# Patient Record
Sex: MALE | Race: ASIAN | NOT HISPANIC OR LATINO | URBAN - METROPOLITAN AREA
[De-identification: names, ages, dates, MRNs, and addresses within clinical notes are randomized per-mention and may not be internally consistent; named-entity substitution may affect disease eponyms.]

---

## 2021-09-11 ENCOUNTER — EMERGENCY (EMERGENCY)
Facility: HOSPITAL | Age: 86
LOS: 1 days | Discharge: ANOTHER HOSPITAL | End: 2021-09-11
Attending: EMERGENCY MEDICINE
Payer: MEDICARE

## 2021-09-11 ENCOUNTER — INPATIENT (INPATIENT)
Facility: HOSPITAL | Age: 86
LOS: 5 days | Discharge: NURSING, REHAB OR LONG-TERM CARE | End: 2021-09-17
Attending: STUDENT IN AN ORGANIZED HEALTH CARE EDUCATION/TRAINING PROGRAM | Admitting: STUDENT IN AN ORGANIZED HEALTH CARE EDUCATION/TRAINING PROGRAM
Payer: MEDICARE

## 2021-09-11 VITALS — DIASTOLIC BLOOD PRESSURE: 52 MMHG | SYSTOLIC BLOOD PRESSURE: 123 MMHG | HEART RATE: 59 BPM

## 2021-09-11 VITALS
DIASTOLIC BLOOD PRESSURE: 87 MMHG | RESPIRATION RATE: 18 BRPM | TEMPERATURE: 98 F | SYSTOLIC BLOOD PRESSURE: 212 MMHG | HEART RATE: 55 BPM | WEIGHT: 85.98 LBS | OXYGEN SATURATION: 97 %

## 2021-09-11 VITALS
TEMPERATURE: 98 F | OXYGEN SATURATION: 98 % | RESPIRATION RATE: 18 BRPM | HEART RATE: 71 BPM | DIASTOLIC BLOOD PRESSURE: 78 MMHG | SYSTOLIC BLOOD PRESSURE: 167 MMHG | WEIGHT: 85.98 LBS

## 2021-09-11 DIAGNOSIS — I61.4 NONTRAUMATIC INTRACEREBRAL HEMORRHAGE IN CEREBELLUM: ICD-10-CM

## 2021-09-11 LAB
ALBUMIN SERPL ELPH-MCNC: 3.9 G/DL — SIGNIFICANT CHANGE UP (ref 3.5–5)
ALP SERPL-CCNC: 55 U/L — SIGNIFICANT CHANGE UP (ref 40–120)
ALT FLD-CCNC: 25 U/L DA — SIGNIFICANT CHANGE UP (ref 10–60)
ANION GAP SERPL CALC-SCNC: 5 MMOL/L — SIGNIFICANT CHANGE UP (ref 5–17)
APTT BLD: 29.4 SEC — SIGNIFICANT CHANGE UP (ref 27–36.3)
APTT BLD: 30.5 SEC — SIGNIFICANT CHANGE UP (ref 27.5–35.5)
AST SERPL-CCNC: 21 U/L — SIGNIFICANT CHANGE UP (ref 10–40)
BASOPHILS # BLD AUTO: 0.02 K/UL — SIGNIFICANT CHANGE UP (ref 0–0.2)
BASOPHILS NFR BLD AUTO: 0.4 % — SIGNIFICANT CHANGE UP (ref 0–2)
BILIRUB SERPL-MCNC: 0.7 MG/DL — SIGNIFICANT CHANGE UP (ref 0.2–1.2)
BLD GP AB SCN SERPL QL: SIGNIFICANT CHANGE UP
BUN SERPL-MCNC: 16 MG/DL — SIGNIFICANT CHANGE UP (ref 7–18)
CALCIUM SERPL-MCNC: 8.7 MG/DL — SIGNIFICANT CHANGE UP (ref 8.4–10.5)
CHLORIDE SERPL-SCNC: 102 MMOL/L — SIGNIFICANT CHANGE UP (ref 96–108)
CO2 SERPL-SCNC: 28 MMOL/L — SIGNIFICANT CHANGE UP (ref 22–31)
CREAT SERPL-MCNC: 0.72 MG/DL — SIGNIFICANT CHANGE UP (ref 0.5–1.3)
EOSINOPHIL # BLD AUTO: 0.01 K/UL — SIGNIFICANT CHANGE UP (ref 0–0.5)
EOSINOPHIL NFR BLD AUTO: 0.2 % — SIGNIFICANT CHANGE UP (ref 0–6)
GLUCOSE SERPL-MCNC: 115 MG/DL — HIGH (ref 70–99)
HCT VFR BLD CALC: 36.6 % — LOW (ref 39–50)
HGB BLD-MCNC: 12.5 G/DL — LOW (ref 13–17)
IMM GRANULOCYTES NFR BLD AUTO: 0.4 % — SIGNIFICANT CHANGE UP (ref 0–1.5)
INR BLD: 1.02 RATIO — SIGNIFICANT CHANGE UP (ref 0.88–1.16)
INR BLD: 1.05 RATIO — SIGNIFICANT CHANGE UP (ref 0.88–1.16)
LYMPHOCYTES # BLD AUTO: 0.91 K/UL — LOW (ref 1–3.3)
LYMPHOCYTES # BLD AUTO: 16.9 % — SIGNIFICANT CHANGE UP (ref 13–44)
MCHC RBC-ENTMCNC: 32.3 PG — SIGNIFICANT CHANGE UP (ref 27–34)
MCHC RBC-ENTMCNC: 34.2 GM/DL — SIGNIFICANT CHANGE UP (ref 32–36)
MCV RBC AUTO: 94.6 FL — SIGNIFICANT CHANGE UP (ref 80–100)
MONOCYTES # BLD AUTO: 0.29 K/UL — SIGNIFICANT CHANGE UP (ref 0–0.9)
MONOCYTES NFR BLD AUTO: 5.4 % — SIGNIFICANT CHANGE UP (ref 2–14)
NEUTROPHILS # BLD AUTO: 4.12 K/UL — SIGNIFICANT CHANGE UP (ref 1.8–7.4)
NEUTROPHILS NFR BLD AUTO: 76.7 % — SIGNIFICANT CHANGE UP (ref 43–77)
NRBC # BLD: 0 /100 WBCS — SIGNIFICANT CHANGE UP (ref 0–0)
PLATELET # BLD AUTO: 155 K/UL — SIGNIFICANT CHANGE UP (ref 150–400)
POTASSIUM SERPL-MCNC: 3.8 MMOL/L — SIGNIFICANT CHANGE UP (ref 3.5–5.3)
POTASSIUM SERPL-SCNC: 3.8 MMOL/L — SIGNIFICANT CHANGE UP (ref 3.5–5.3)
PROT SERPL-MCNC: 7.7 G/DL — SIGNIFICANT CHANGE UP (ref 6–8.3)
PROTHROM AB SERPL-ACNC: 12 SEC — SIGNIFICANT CHANGE UP (ref 10.6–13.6)
PROTHROM AB SERPL-ACNC: 12.1 SEC — SIGNIFICANT CHANGE UP (ref 10.6–13.6)
RBC # BLD: 3.87 M/UL — LOW (ref 4.2–5.8)
RBC # FLD: 11.9 % — SIGNIFICANT CHANGE UP (ref 10.3–14.5)
SARS-COV-2 RNA SPEC QL NAA+PROBE: SIGNIFICANT CHANGE UP
SODIUM SERPL-SCNC: 135 MMOL/L — SIGNIFICANT CHANGE UP (ref 135–145)
TROPONIN I SERPL-MCNC: <0.015 NG/ML — SIGNIFICANT CHANGE UP (ref 0–0.04)
WBC # BLD: 5.37 K/UL — SIGNIFICANT CHANGE UP (ref 3.8–10.5)
WBC # FLD AUTO: 5.37 K/UL — SIGNIFICANT CHANGE UP (ref 3.8–10.5)

## 2021-09-11 PROCEDURE — 71045 X-RAY EXAM CHEST 1 VIEW: CPT

## 2021-09-11 PROCEDURE — 87635 SARS-COV-2 COVID-19 AMP PRB: CPT

## 2021-09-11 PROCEDURE — 36430 TRANSFUSION BLD/BLD COMPNT: CPT

## 2021-09-11 PROCEDURE — 86850 RBC ANTIBODY SCREEN: CPT

## 2021-09-11 PROCEDURE — 99233 SBSQ HOSP IP/OBS HIGH 50: CPT

## 2021-09-11 PROCEDURE — 99291 CRITICAL CARE FIRST HOUR: CPT

## 2021-09-11 PROCEDURE — 82962 GLUCOSE BLOOD TEST: CPT

## 2021-09-11 PROCEDURE — 86900 BLOOD TYPING SEROLOGIC ABO: CPT

## 2021-09-11 PROCEDURE — 85730 THROMBOPLASTIN TIME PARTIAL: CPT

## 2021-09-11 PROCEDURE — 99291 CRITICAL CARE FIRST HOUR: CPT | Mod: 25

## 2021-09-11 PROCEDURE — 70498 CT ANGIOGRAPHY NECK: CPT | Mod: 26,MA

## 2021-09-11 PROCEDURE — 70496 CT ANGIOGRAPHY HEAD: CPT | Mod: MA

## 2021-09-11 PROCEDURE — 85025 COMPLETE CBC W/AUTO DIFF WBC: CPT

## 2021-09-11 PROCEDURE — 36415 COLL VENOUS BLD VENIPUNCTURE: CPT

## 2021-09-11 PROCEDURE — 70450 CT HEAD/BRAIN W/O DYE: CPT | Mod: MA

## 2021-09-11 PROCEDURE — 93005 ELECTROCARDIOGRAM TRACING: CPT

## 2021-09-11 PROCEDURE — 71045 X-RAY EXAM CHEST 1 VIEW: CPT | Mod: 26

## 2021-09-11 PROCEDURE — 70496 CT ANGIOGRAPHY HEAD: CPT | Mod: 26,MA

## 2021-09-11 PROCEDURE — 93010 ELECTROCARDIOGRAM REPORT: CPT

## 2021-09-11 PROCEDURE — 99285 EMERGENCY DEPT VISIT HI MDM: CPT

## 2021-09-11 PROCEDURE — 84484 ASSAY OF TROPONIN QUANT: CPT

## 2021-09-11 PROCEDURE — 85610 PROTHROMBIN TIME: CPT

## 2021-09-11 PROCEDURE — 80053 COMPREHEN METABOLIC PANEL: CPT

## 2021-09-11 PROCEDURE — 70498 CT ANGIOGRAPHY NECK: CPT | Mod: MA

## 2021-09-11 PROCEDURE — 86901 BLOOD TYPING SEROLOGIC RH(D): CPT

## 2021-09-11 PROCEDURE — 70450 CT HEAD/BRAIN W/O DYE: CPT | Mod: 26,MA,59

## 2021-09-11 RX ORDER — NICARDIPINE HYDROCHLORIDE 30 MG/1
5 CAPSULE, EXTENDED RELEASE ORAL
Qty: 40 | Refills: 0 | Status: DISCONTINUED | OUTPATIENT
Start: 2021-09-11 | End: 2021-09-12

## 2021-09-11 RX ORDER — ACETAMINOPHEN 500 MG
650 TABLET ORAL ONCE
Refills: 0 | Status: COMPLETED | OUTPATIENT
Start: 2021-09-11 | End: 2021-09-11

## 2021-09-11 RX ORDER — MECLIZINE HCL 12.5 MG
25 TABLET ORAL ONCE
Refills: 0 | Status: DISCONTINUED | OUTPATIENT
Start: 2021-09-11 | End: 2021-09-11

## 2021-09-11 RX ORDER — NICARDIPINE HYDROCHLORIDE 30 MG/1
5 CAPSULE, EXTENDED RELEASE ORAL
Qty: 40 | Refills: 0 | Status: DISCONTINUED | OUTPATIENT
Start: 2021-09-11 | End: 2021-09-14

## 2021-09-11 RX ORDER — SODIUM CHLORIDE 9 MG/ML
1000 INJECTION INTRAMUSCULAR; INTRAVENOUS; SUBCUTANEOUS ONCE
Refills: 0 | Status: DISCONTINUED | OUTPATIENT
Start: 2021-09-11 | End: 2021-09-11

## 2021-09-11 RX ORDER — CHLORHEXIDINE GLUCONATE 213 G/1000ML
1 SOLUTION TOPICAL
Refills: 0 | Status: DISCONTINUED | OUTPATIENT
Start: 2021-09-11 | End: 2021-09-17

## 2021-09-11 RX ADMIN — NICARDIPINE HYDROCHLORIDE 5 MG/HR: 30 CAPSULE, EXTENDED RELEASE ORAL at 18:00

## 2021-09-11 RX ADMIN — Medication 650 MILLIGRAM(S): at 11:30

## 2021-09-11 RX ADMIN — Medication 650 MILLIGRAM(S): at 10:38

## 2021-09-11 RX ADMIN — NICARDIPINE HYDROCHLORIDE 25 MG/HR: 30 CAPSULE, EXTENDED RELEASE ORAL at 18:00

## 2021-09-11 RX ADMIN — NICARDIPINE HYDROCHLORIDE 25 MG/HR: 30 CAPSULE, EXTENDED RELEASE ORAL at 10:38

## 2021-09-11 NOTE — H&P ADULT - HISTORY OF PRESENT ILLNESS
85 y/o male with pmh of      85 y/o male (preferentially, Cantonese-speaking) with pmh of irregular heart rate, BPH, and disc herniation who presented to [facility] for nausea, headache, and the inability to walk on his own without feeling dizzy. Last night, the pt around 8:45 pm was about to have dinner when he noticed he felt nauseous and had a frontal headache and it was hard for him to get up. His family had to help him to get up and go to his bed with his cane. While laying in bed, his nausea was gone, however his headache was persistent. The pt and his family decided that in the morning they would go to his PCP for further care. However given his persistent nausea and bilateral frontal headache in the morning, they called 911 and went to the ED.    ED VS: 97.6 F, 71, 167/78, 18, and 98% on RA. ED tx: no current treatment started.

## 2021-09-11 NOTE — CONSULT NOTE ADULT - ATTENDING COMMENTS
85 yr old male w/ R cerebellar hemorrhage (hypertension related likely). Patient is awake and alert with some dysmetria. Agree with the above plan per ACP.   - Admit to ICU for neuro checks  - Patient needs three sequential stable scans  - management per Stroke neurology  - would refrain from all AC for at least two weeks

## 2021-09-11 NOTE — ED PROVIDER NOTE - CONSTITUTIONAL, MLM
awake, alert, oriented to person, place, time/situation and in no apparent distress. slender, action tremor normal...

## 2021-09-11 NOTE — ED PROVIDER NOTE - OBJECTIVE STATEMENT
85 male, hx: BPH, HTN - presents as a transfer from [place] for Cerebellar bleed. As per family at 9 PM yesterday at dinner he was endorsing a B/L frontal headache, went to rest later that night when the headache persisted and started to develop gait instability. Denies any recent trauma/falls. Denies any fevers, chills, cough, CP, SOB, abdominal pain, nausea, vomiting, diarrhea, constipation, bloody stools, dysuric symptoms. Reports he has an unbalanced gait since yesterday. Spoke with Daughter who is gathering medication list as it's unclear if he's on Blood thinners. Presents on Nicardipine with SBP < 160.

## 2021-09-11 NOTE — ED PROVIDER NOTE - CLINICAL SUMMARY MEDICAL DECISION MAKING FREE TEXT BOX
85 yr old male with hx of HTN, BPH, gastritis presents to ed c/o dizziness, generalized weakness, nausea and frontal headache around 9p yesterday which improve after sleeping but return this am. generally slender, no visual changes, no fever, no sob, no cp, no abd pain, no dysuria, no focal weakness. didn't take meds this am but unsure which bp meds pt takes.    generalized weakness with frontal headache r/o ic mass vs deconditioning vs malnutrition vs atypical acs. labs, ekg, ct head, cxr, admit, tylenol

## 2021-09-11 NOTE — ED PROVIDER NOTE - PROGRESS NOTE DETAILS
espinoza: headache less. Bp 146/ with Nicardipine 2.5mg. pt comfortable. spoke with Dr kelly and accepted to [facility] but wants 1 unit plt.

## 2021-09-11 NOTE — ED PROVIDER NOTE - NS ED ROS FT
Constitution: No Fever or chills, No Weight Loss,   Eyes: No visual changes  HEENT: No cough, No Discharge, No Rhinorrhea, No URI symptoms  Cardio: No Chest pain, No Palpitations, No Dyspnea  Resp: No SOB, No Wheezing  GI: No abdominal pain, No Nausea, No Vomiting, No Constipation, No Diarrhea  : No burning upon urination, trouble urinating, no foul odor from urine  MSK: No Back pain, No Numbness, No Tingling, No Weakness  Neuro: (+) Headache, No changes to Vision, No changes to Hearing, (+) abnormal Gait  Skin: No rashes, No Bruising, No Swelling

## 2021-09-11 NOTE — ED PROVIDER NOTE - PHYSICAL EXAMINATION
GEN - NAD; non-toxic; A+Ox3, speaking full sentences, (+) unsteady gait   HENT - NC/AT, No visible Ecchymosis, No Abrasions, No Lac/Tears, MMM, no discharge  EYES - EOMI, PERRL, no conjunctival pallor, no scleral icterus  NECK - Neck supple, No LAD, No Swelling  PULM - CTA B/L,  symmetric breath sounds  CV -  RRR, S1 S2, no murmurs 2+ Pulses B/L UE  GI - (-) Paulson's, (-) Rovsings, (-) McBurneys; NT/ND, soft, no guarding, no rebound, no masses    MSK/EXT- no CVA tenderness; no edema, no gross deformity, warm and well perfused, no calf tenderness/swelling/erythema   SKIN - no rash or bruising  NEUROLOGIC - alert, CN2-12 intact, sensation intact, moving all 4 ext with 5/5 Strength; (+) Abnormal FTN on L; (+) Unstable Gait

## 2021-09-11 NOTE — ED PROVIDER NOTE - CLINICAL SUMMARY MEDICAL DECISION MAKING FREE TEXT BOX
Impression:  R cerebellar bleed, awaiting NSGY recs.  HD wnl.  +cerebellar symptoms on exam  Plan:  dispo per NSGY recs.

## 2021-09-11 NOTE — ED ADULT NURSE REASSESSMENT NOTE - NS ED NURSE REASSESS COMMENT FT1
Break RN: pt's /72 at this time, MD Cornejo made aware. Cardene drip (med was initiated at [place]) stopped at this time as per MD orders. Pt in NAD, respirations even/unlabored, will continue to monitor.

## 2021-09-11 NOTE — CONSULT NOTE ADULT - ASSESSMENT
85 yr old male w/ R cerebellar hemorrhage (hypertension related likely)  -Admit to monitored setting  -q1 hour neuro checks  -Rpt CT head in 24 hours  -No role for angiogram (d/w INR attending)  -Stroke neurology consult   -D/w Dr Alfonso

## 2021-09-11 NOTE — ED PROVIDER NOTE - OBJECTIVE STATEMENT
85 yr old male with hx of HTN, BPH, gastritis presents to ed c/o dizziness, generalized weakness, nausea and frontal headache around 9p yesterday which improve after sleeping but return this am. generally slender, no visual changes, no fever, no sob, no cp, no abd pain, no dysuria, no focal weakness. didn't take meds this am but unsure which bp meds pt takes.

## 2021-09-11 NOTE — H&P ADULT - ATTENDING COMMENTS
pt is an 83 yo male with hx CAD, htn, irregular heart rate who presents with   episode of dizziness and nausea, last pm during supper, per daughter , no   hx fall, chest pain or sob, helped to bed, this am symptoms continued,   pt brought to [place], noted to have a cerebellar bleed on ct scan  with elevated sbp 170's.  bp 150/69 rr 18 heent no jvd, lungs clear  heart s1s2 abdomen cachectic , neuro finger to nose dysmetria,     labs noted as above    ct scan 1.2 x 4.4 parenchymal hemorrhage in the  cerebellum      A/P  83 yo male with acute intraparenchymal hemorrhage cerebellar  -monitor neuro exam q 1 hrs  -repeat ct scan in am  -keep npo  -aspiration  precaution  -monitor lytes and replete as necessary  -monitor urine output  -dvt prophylaxis with scds  -hold all antiplatelets  -neurology evaluation for stroke

## 2021-09-11 NOTE — CONSULT NOTE ADULT - SUBJECTIVE AND OBJECTIVE BOX
HPI:  85 male, hx: BPH, HTN - presents as a transfer from [place] for Cerebellar bleed. As per family at 9 PM yesterday at dinner he was endorsing a B/L frontal headache, went to rest later that night when the headache persisted and started to develop gait instability. Denies any recent trauma/falls. Denies any fevers, chills, cough, CP, SOB, abdominal pain, nausea, vomiting, diarrhea, constipation, bloody stools, dysuric symptoms. Reports he has an unbalanced gait since yesterday. Spoke with Daughter who is gathering medication list as it's unclear if he's on Blood thinners. Presents on Nicardipine with SBP < 160.    PAST MEDICAL & SURGICAL HISTORY:  No pertinent past medical history    No significant past surgical history      Allergies    No Known Allergies        SOCIAL HISTORY:  FAMILY HISTORY:  No pertinent family history in first degree relatives      Vital Signs Last 24 Hrs  T(C): 36.4 (11 Sep 2021 16:11), Max: 36.4 (11 Sep 2021 16:11)  T(F): 97.6 (11 Sep 2021 16:11), Max: 97.6 (11 Sep 2021 16:11)  HR: 89 (11 Sep 2021 17:05) (71 - 89)  BP: 149/62 (11 Sep 2021 17:05) (142/73 - 167/78)  RR: 19 (11 Sep 2021 17:05) (18 - 19)  SpO2: 99% (11 Sep 2021 17:05) (98% - 99%)    PHYSICAL EXAM:  Awake Alert Attentive Affect appropriate, speech soft but clear  Cranial Nerves II-XII Intact  Pupils: PERRL  Motor- Moving all extremities   Dysmetria in FT noted BL  patient c/o dizziness        LABS:        RADIOLOGY & ADDITIONAL STUDIES:    ct/cta at [facility] reviewed electronically with attending - no aneursym, + R cerebellar hematoma noted   HPI:  85 male, hx: BPH, HTN - presents as a transfer from [place] for Cerebellar bleed. As per family at 9 PM yesterday at dinner he was endorsing a B/L frontal headache, went to rest later that night when the headache persisted and started to develop gait instability. Denies any recent trauma/falls. Denies any fevers, chills, cough, CP, SOB, abdominal pain, nausea, vomiting, diarrhea, constipation, bloody stools, dysuric symptoms. Reports he has an unbalanced gait since yesterday. Spoke with Daughter who is gathering medication list as it's unclear if he's on Blood thinners. Presents on Nicardipine with SBP < 160.    PAST MEDICAL & SURGICAL HISTORY:  No pertinent past medical history    No significant past surgical history      Allergies    No Known Allergies      SOCIAL HISTORY:  FAMILY HISTORY:  No pertinent family history in first degree relatives      Vital Signs Last 24 Hrs  T(C): 36.4 (11 Sep 2021 16:11), Max: 36.4 (11 Sep 2021 16:11)  T(F): 97.6 (11 Sep 2021 16:11), Max: 97.6 (11 Sep 2021 16:11)  HR: 89 (11 Sep 2021 17:05) (71 - 89)  BP: 149/62 (11 Sep 2021 17:05) (142/73 - 167/78)  RR: 19 (11 Sep 2021 17:05) (18 - 19)  SpO2: 99% (11 Sep 2021 17:05) (98% - 99%)    PHYSICAL EXAM:  Awake Alert Attentive Affect appropriate, speech soft but clear  Cranial Nerves II-XII Intact  Pupils: PERRL  Motor- Moving all extremities   Dysmetria in FT noted BL  patient c/o dizziness        LABS:      RADIOLOGY & ADDITIONAL STUDIES:    [facility] - no aneursym, + R cerebellar hematoma noted

## 2021-09-11 NOTE — ED PROVIDER NOTE - ATTENDING CONTRIBUTION TO CARE
MD Benjamin:  patient seen and evaluated with the resident.  I was present for key portions of the History & Physical, and I agree with the Impression & Plan.  MD Benjamin: 86 yo M, xfer from [facility] with CT/CTA evidence of cerebellar bleed  Context:  HA and ataxia onset last night.  Not anticoagulated.  Got PLT xfusion at OSH at recommendation of NSGY, and on nicardipine gtt with /70s  Associated Sx: cannot ambulate.  No N/v/d, no f/c, no blurry vision  Location: n/a  Better:  laying still  Worse:  attempting to ambulating  VS: wnl.  Physical Exam: elderly M, NAD, NCAT, PERRL, EOMI, neck supple, RRR, CTA B, Abd: s/nd/nt, 3  Ext: no edema.  Neuro: AAOx3, finger-to-nose & heel-to-shin abnormal (worse on L than R)  Impression:  R cerebellar bleed, awaiting NSGY recs.  HD wnl.  +cerebellar symptoms on exam  Plan:  dispo per NSGY recs. MD Benjamin:  patient seen and evaluated with the resident.  I was present for key portions of the History & Physical, and I agree with the Impression & Plan.  MD Benjamin: 84 yo M, xfer from [facility] with CT/CTA evidence of cerebellar bleed  Context:  HA and ataxia onset last night.  +takes clopidogrel 75mg; last dose last night. Got PLT xfusion at OSH at recommendation of NSGY, and on nicardipine gtt with /70s  Associated Sx: cannot ambulate.  No N/v/d, no f/c, no blurry vision  Location: n/a  Better:  laying still  Worse:  attempting to ambulating  VS: wnl.  Physical Exam: elderly M, NAD, NCAT, PERRL, EOMI, neck supple, RRR, CTA B, Abd: s/nd/nt, 3  Ext: no edema.  Neuro: AAOx3, finger-to-nose & heel-to-shin abnormal (worse on L than R)  Impression:  R cerebellar bleed, awaiting NSGY recs.  HD wnl.  +cerebellar symptoms on exam  Plan:  dispo per NSGY recs.

## 2021-09-11 NOTE — H&P ADULT - ASSESSMENT
83 y/o male with pmh of BPH presenting to [facility] as a transfer from [facility] due to intraparenchymal bleed in the cerebellum resulting in MICU admission for further management of blood pressure control and neurochecks.    NEUROLOGY:  CTA Head and Neck with intraparenchymal hemorrhage of cerebellum  Neurosx  Neurochecks q4  Maintain SBP<140, nicardipine gtt to maintain this goal  Repeat CTH for tomorrow AM    PULMONARY:  No active issues    CARDIOLOGY:  Maintain SBP <140 for CTH findings  No active issues    GASTROINTESTINAL:  DASH diet    RENAL/:  BMP pending    INFECTIOUS DISEASE:  Afebrile, pending WBC    HEMATOLOGY:  CBC pending    ENDOCRINE  No active issues    PROPHYLAXIS  SCDs, hold off on pharmacological AC in setting of CTH findings    ETHICS/DISPOSITION:  - Code status to be discussed with HCP    LINES/DRAINS/TUBES/DEVICES:  - Peripheral lines    Vale Blum MD, PGY2  Internal Medicine 85 y/o male with pmh of BPH presenting to [facility] as a transfer from [facility] due to intraparenchymal bleed in the cerebellum resulting in MICU admission for further management of blood pressure control and neurochecks.    NEUROLOGY:  CTA Head and Neck with intraparenchymal hemorrhage of cerebellum  Neurosx  Neurochecks q4  Maintain SBP<140, nicardipine gtt to maintain this goal  Repeat CTH for tomorrow at 9 AM (24 hours later)  Stroke neurology consulted    PULMONARY:  No active issues    CARDIOLOGY:  Maintain SBP <140 for CTH findings  No active issues    GASTROINTESTINAL:  DASH diet    RENAL/:  BMP pending    INFECTIOUS DISEASE:  Afebrile, pending WBC    HEMATOLOGY:  CBC pending    ENDOCRINE  No active issues    PROPHYLAXIS  SCDs, hold off on pharmacological AC in setting of CTH findings    ETHICS/DISPOSITION:  - Code status to be discussed with HCP    LINES/DRAINS/TUBES/DEVICES:  - Peripheral lines    Vale Blum MD, PGY2  Internal Medicine

## 2021-09-11 NOTE — ED ADULT NURSE NOTE - OBJECTIVE STATEMENT
Pt arriving to Trauma A A&OX2 disoriented to place as a transfer from [place] for a cerebellar bleed. Pt alert, responsive to verbal stimuli. No facial droop/slurred speech noted. Tremor to L arm. Equal strength bilaterally. Resp even and unlabored. Pt states he felt dizzy yesterday and had a fall. Denies hitting head. Pt has trouble finding words. No obvious deformities noted. Pt arriving with 18g IV in RAC and 20g IV placed in L arm. Pt arriving with nicardipine drip at 12.5ml/hour. /73. Awaiting further orders. Daniel RN: Pt arriving to Trauma A A&OX2 disoriented to place as a transfer from [place] for a cerebellar bleed. Pt alert, responsive to verbal stimuli. No facial droop/slurred speech noted. PERRLA. Equal strength bilaterally. Resp even and unlabored. Pt states he felt dizzy yesterday and had a fall. Denies hitting head. Pt has trouble finding words. No obvious deformities noted. Pt arriving with 18g IV in RAC and 20g IV placed in L arm. Pt arriving with nicardipine drip at 12.5ml/hour. /73. Awaiting further orders. Report given to primary RN.

## 2021-09-11 NOTE — ED ADULT NURSE NOTE - CHIEF COMPLAINT QUOTE
from [facility] for intraparenchymal hemorrhage in cerebellum. Had onset of dizziness yesterday at 9pm. on Nicardipine 2.5mg/HR. On plavix as per ems.  HTN in FH bp 212/87. Hx HTN, Gastritis, BPH

## 2021-09-11 NOTE — H&P ADULT - NSICDXPASTMEDICALHX_GEN_ALL_CORE_FT
PAST MEDICAL HISTORY:  BPH (benign prostatic hyperplasia)     Irregular heart rate     Lumbar disc herniation

## 2021-09-11 NOTE — ED CLERICAL - NS ED CLERK NOTE PRE-ARRIVAL INFORMATION; ADDITIONAL PRE-ARRIVAL INFORMATION
Patient transferred for Acute intraparenchymal hemorrhage in Cerebellum.  Covid neg pt on nicardipine drip.

## 2021-09-11 NOTE — ED ADULT TRIAGE NOTE - CHIEF COMPLAINT QUOTE
from [facility] for intraparenchymal hemorrhage in cerebellum. Had onset of dizziness yesterday at 9pm. on Nicardipine 2.5mg/HR. On plavix as per ems.  HTN in FH bp 212/87. Hx HTN, Gastritis, BPH

## 2021-09-11 NOTE — H&P ADULT - NSHPLABSRESULTS_GEN_ALL_CORE
Awaiting labs.\    CTA Head and Neck demonstrated        1. Right carotid system:  No hemodynamically significant stenosis.        2.   Left carotid system:  No hemodynamically significant stenosis.        3.   Intracranial circulation: No aneurysm or AVM. No hemodynamically significant stenosis.        4.   Brain:  There is acute intraparenchymal hemorrhage in the cerebellum at the midline and right of midline measuring approximately 1.2 x 4.4 cm with surrounding mild vasogenic edema, not significantly changed since prior exam

## 2021-09-11 NOTE — H&P ADULT - NSHPREVIEWOFSYSTEMS_GEN_ALL_CORE

## 2021-09-11 NOTE — H&P ADULT - NSHPPHYSICALEXAM_GEN_ALL_CORE
PHYSICAL EXAM:  T(C): 36.4 (09-11-21 @ 16:11), Max: 36.4 (09-11-21 @ 16:11)  HR: 75 (09-11-21 @ 18:36) (71 - 90)  BP: 150/73 (09-11-21 @ 18:36) (106/72 - 167/78)  RR: 20 (09-11-21 @ 18:36) (18 - 20)  SpO2: 100% (09-11-21 @ 18:36) (98% - 100%)  GENERAL: NAD, thin  HEAD:  Atraumatic, Normocephalic. No overt bruises.   EYES: EOMI, PERRLA, conjunctiva and sclera clear  NECK: Supple, No JVD  CHEST/LUNG: Clear to auscultation bilaterally; No wheeze  HEART: Regular rate and rhythm; No murmurs, rubs, or gallops  ABDOMEN: Soft, Nontender, Nondistended; Bowel sounds present  EXTREMITIES:  2+ Peripheral Pulses, No clubbing, cyanosis, or edema  PSYCH: AAOx3  NEUROLOGY: non-focal  SKIN: No rashes or lesions

## 2021-09-12 LAB
ALBUMIN SERPL ELPH-MCNC: 4.6 G/DL — SIGNIFICANT CHANGE UP (ref 3.3–5)
ALP SERPL-CCNC: 63 U/L — SIGNIFICANT CHANGE UP (ref 40–120)
ALT FLD-CCNC: 17 U/L — SIGNIFICANT CHANGE UP (ref 4–41)
ANION GAP SERPL CALC-SCNC: 18 MMOL/L — HIGH (ref 7–14)
AST SERPL-CCNC: 30 U/L — SIGNIFICANT CHANGE UP (ref 4–40)
BILIRUB SERPL-MCNC: 0.7 MG/DL — SIGNIFICANT CHANGE UP (ref 0.2–1.2)
BUN SERPL-MCNC: 14 MG/DL — SIGNIFICANT CHANGE UP (ref 7–23)
CALCIUM SERPL-MCNC: 9.5 MG/DL — SIGNIFICANT CHANGE UP (ref 8.4–10.5)
CHLORIDE SERPL-SCNC: 97 MMOL/L — LOW (ref 98–107)
CO2 SERPL-SCNC: 21 MMOL/L — LOW (ref 22–31)
COVID-19 SPIKE DOMAIN AB INTERP: POSITIVE
COVID-19 SPIKE DOMAIN ANTIBODY RESULT: >250 U/ML — HIGH
CREAT SERPL-MCNC: 0.62 MG/DL — SIGNIFICANT CHANGE UP (ref 0.5–1.3)
GLUCOSE SERPL-MCNC: 126 MG/DL — HIGH (ref 70–99)
HCT VFR BLD CALC: 38 % — LOW (ref 39–50)
HGB BLD-MCNC: 13.2 G/DL — SIGNIFICANT CHANGE UP (ref 13–17)
MAGNESIUM SERPL-MCNC: 2.1 MG/DL — SIGNIFICANT CHANGE UP (ref 1.6–2.6)
MCHC RBC-ENTMCNC: 32.3 PG — SIGNIFICANT CHANGE UP (ref 27–34)
MCHC RBC-ENTMCNC: 34.7 GM/DL — SIGNIFICANT CHANGE UP (ref 32–36)
MCV RBC AUTO: 92.9 FL — SIGNIFICANT CHANGE UP (ref 80–100)
NRBC # BLD: 0 /100 WBCS — SIGNIFICANT CHANGE UP
NRBC # FLD: 0 K/UL — SIGNIFICANT CHANGE UP
PHOSPHATE SERPL-MCNC: 2.5 MG/DL — SIGNIFICANT CHANGE UP (ref 2.5–4.5)
PLATELET # BLD AUTO: 225 K/UL — SIGNIFICANT CHANGE UP (ref 150–400)
POTASSIUM SERPL-MCNC: 3.4 MMOL/L — LOW (ref 3.5–5.3)
POTASSIUM SERPL-SCNC: 3.4 MMOL/L — LOW (ref 3.5–5.3)
PROT SERPL-MCNC: 7.8 G/DL — SIGNIFICANT CHANGE UP (ref 6–8.3)
RBC # BLD: 4.09 M/UL — LOW (ref 4.2–5.8)
RBC # FLD: 11.8 % — SIGNIFICANT CHANGE UP (ref 10.3–14.5)
SARS-COV-2 IGG+IGM SERPL QL IA: >250 U/ML — HIGH
SARS-COV-2 IGG+IGM SERPL QL IA: POSITIVE
SODIUM SERPL-SCNC: 136 MMOL/L — SIGNIFICANT CHANGE UP (ref 135–145)
WBC # BLD: 10.05 K/UL — SIGNIFICANT CHANGE UP (ref 3.8–10.5)
WBC # FLD AUTO: 10.05 K/UL — SIGNIFICANT CHANGE UP (ref 3.8–10.5)

## 2021-09-12 PROCEDURE — 70450 CT HEAD/BRAIN W/O DYE: CPT | Mod: 26

## 2021-09-12 PROCEDURE — 99233 SBSQ HOSP IP/OBS HIGH 50: CPT | Mod: GC

## 2021-09-12 RX ORDER — FINASTERIDE 5 MG/1
5 TABLET, FILM COATED ORAL DAILY
Refills: 0 | Status: DISCONTINUED | OUTPATIENT
Start: 2021-09-12 | End: 2021-09-17

## 2021-09-12 RX ORDER — METOPROLOL TARTRATE 50 MG
25 TABLET ORAL
Refills: 0 | Status: DISCONTINUED | OUTPATIENT
Start: 2021-09-12 | End: 2021-09-12

## 2021-09-12 RX ORDER — INFLUENZA VIRUS VACCINE 15; 15; 15; 15 UG/.5ML; UG/.5ML; UG/.5ML; UG/.5ML
0.7 SUSPENSION INTRAMUSCULAR ONCE
Refills: 0 | Status: DISCONTINUED | OUTPATIENT
Start: 2021-09-12 | End: 2021-09-17

## 2021-09-12 RX ORDER — TAMSULOSIN HYDROCHLORIDE 0.4 MG/1
0.4 CAPSULE ORAL AT BEDTIME
Refills: 0 | Status: DISCONTINUED | OUTPATIENT
Start: 2021-09-12 | End: 2021-09-17

## 2021-09-12 RX ORDER — METOPROLOL TARTRATE 50 MG
25 TABLET ORAL DAILY
Refills: 0 | Status: DISCONTINUED | OUTPATIENT
Start: 2021-09-12 | End: 2021-09-12

## 2021-09-12 RX ORDER — POTASSIUM CHLORIDE 20 MEQ
40 PACKET (EA) ORAL ONCE
Refills: 0 | Status: COMPLETED | OUTPATIENT
Start: 2021-09-12 | End: 2021-09-12

## 2021-09-12 RX ORDER — INFLUENZA VIRUS VACCINE 15; 15; 15; 15 UG/.5ML; UG/.5ML; UG/.5ML; UG/.5ML
0.5 SUSPENSION INTRAMUSCULAR ONCE
Refills: 0 | Status: DISCONTINUED | OUTPATIENT
Start: 2021-09-12 | End: 2021-09-12

## 2021-09-12 RX ORDER — METOPROLOL TARTRATE 50 MG
25 TABLET ORAL
Refills: 0 | Status: DISCONTINUED | OUTPATIENT
Start: 2021-09-12 | End: 2021-09-14

## 2021-09-12 RX ADMIN — NICARDIPINE HYDROCHLORIDE 25 MG/HR: 30 CAPSULE, EXTENDED RELEASE ORAL at 07:41

## 2021-09-12 RX ADMIN — Medication 25 MILLIGRAM(S): at 22:33

## 2021-09-12 RX ADMIN — Medication 40 MILLIEQUIVALENT(S): at 06:52

## 2021-09-12 RX ADMIN — CHLORHEXIDINE GLUCONATE 1 APPLICATION(S): 213 SOLUTION TOPICAL at 08:54

## 2021-09-12 RX ADMIN — TAMSULOSIN HYDROCHLORIDE 0.4 MILLIGRAM(S): 0.4 CAPSULE ORAL at 22:33

## 2021-09-12 RX ADMIN — FINASTERIDE 5 MILLIGRAM(S): 5 TABLET, FILM COATED ORAL at 15:31

## 2021-09-12 NOTE — CHART NOTE - NSCHARTNOTEFT_GEN_A_CORE
MICU Transfer Note        Transfer from: MICU    Transfer to: (X) Medicine    (  ) Telemetry     (   ) RCU        (    ) Palliative         (   ) Stroke Unit          (   ) __________________    Accepting Physician:    HPI: 83 y/o male (preferentially, Cantonese-speaking) with pmh of irregular heart rate on plavix, BPH, and disc herniation tranferred from [place] ED for cerebellar bleed. Presented to [facility] for nausea, headache, and the inability to walk on his own without feeling dizzy. The night before around 8:45pm, the pt was about to have dinner when he noticed he felt nauseous and had a frontal headache and it was hard for him to get up. His family had to help him to get up and go to his bed with his cane. While laying in bed, his nausea was gone, however his headache was persistent. The pt and his family decided that in the morning they would go to his PCP for further care. However given his persistent nausea and bilateral frontal headache in the morning, they called 911 and went to the ED.    ED VS: 97.6 F, 71, 167/78, 18, and 98% on RA. ED tx: no treatment started.    Presented to [facility] ED on Nicardipine with SBP < 160. Neurosurgery consulted, no surgical intervention advised. Recommended q1 hour neuro checks with repeat CT head in 24 hours. No role for angiogram (d/w INR attending).    MICU COURSE: Admitted to MICU for BP control and neuro checks in setting of Cerebellar hemorrhage. Repeat head CT done, showed Acute cerebellar hemorrhage, unchanged from 9/11/2021. Patient titrated off nicardipine drip, BP ranging between 120/56 - 134/65. Started on Metoprolol 25mg daily (home medication). Hemodynamically stable, ready for transfer to medicine.        ASSESSMENT & PLAN:   83 y/o male with pmh of BPH presenting to [facility] as a transfer from [facility] due to intraparenchymal bleed in the cerebellum resulting in MICU admission for further management of blood pressure control and neuro checks.    NEUROLOGY:  CTA Head and Neck with intraparenchymal hemorrhage of cerebellum performed on 9/11.   Showed no significant stenosis in the R and L carotid. Acute intraparenchymal hemorrhage in the cerebellum at the midline and right of midline measuring approximately 1.2 x 4.4 cm with surrounding mild vasogenic edema.  Repeat Head CT without contrast at 24 hours, showed no acute change.   Neurosx following  Neurochecks q4 on medicine floors  Maintain SBP<140, started on home med Metoprolol 25mg daily  MRI/MRA per neuro, hold aspirin and plavix for now.  F/u Neuro recs    PULMONARY:  No active issues    CARDIOLOGY:  Maintain SBP <140 for CTH findings  Started on Metoprolol 25mg daily (home med)  Hold aspirin and plavix as per neurology recs  No active issues    GASTROINTESTINAL:  DASH diet    RENAL/:  BMP pending    INFECTIOUS DISEASE:  Afebrile, pending WBC    HEMATOLOGY:  CBC pending    ENDOCRINE  No active issues    PROPHYLAXIS  SCDs, hold off on pharmacological AC in setting of CTH findings    ETHICS/DISPOSITION:  - Code status to be discussed with HCP    LINES/DRAINS/TUBES/DEVICES:  - Peripheral lines        FOR FOLLOW UP:  - F/u neuro recs  - PT/OT  - Hold aspirin and plavix in setting of ICH  - F/u code status with HCP MICU Transfer Note        Transfer from: MICU    Transfer to: () Medicine    (X) Telemetry     (   ) RCU        (    ) Palliative         (   ) Stroke Unit          (   ) __________________    Accepting Physician:    HPI: 85 y/o male (preferentially, Cantonese-speaking) with pmh of irregular heart rate on plavix, BPH, and disc herniation tranferred from [place] ED for cerebellar bleed. Presented to [facility] for nausea, headache, and the inability to walk on his own without feeling dizzy. The night before around 8:45pm, the pt was about to have dinner when he noticed he felt nauseous and had a frontal headache and it was hard for him to get up. His family had to help him to get up and go to his bed with his cane. While laying in bed, his nausea was gone, however his headache was persistent. The pt and his family decided that in the morning they would go to his PCP for further care. However given his persistent nausea and bilateral frontal headache in the morning, they called 911 and went to the ED.    ED VS: 97.6 F, 71, 167/78, 18, and 98% on RA. ED tx: no treatment started.    Presented to [facility] ED on Nicardipine with SBP < 160. Neurosurgery consulted, no surgical intervention advised. Recommended q1 hour neuro checks with repeat CT head in 24 hours. No role for angiogram (d/w INR attending).    MICU COURSE: Admitted to MICU for BP control and neuro checks in setting of Cerebellar hemorrhage. Repeat head CT done, showed Acute cerebellar hemorrhage, unchanged from 9/11/2021. Patient titrated off nicardipine drip, BP ranging between 120/56 - 134/65. Started on Metoprolol 25mg BID (home medication is 25mg daily). Hemodynamically stable, ready for transfer to medicine.        ASSESSMENT & PLAN:   85 y/o male with pmh of BPH presenting to [facility] as a transfer from [facility] due to intraparenchymal bleed in the cerebellum resulting in MICU admission for further management of blood pressure control and neuro checks.    NEUROLOGY:  CTA Head and Neck with intraparenchymal hemorrhage of cerebellum performed on 9/11.   Showed no significant stenosis in the R and L carotid. Acute intraparenchymal hemorrhage in the cerebellum at the midline and right of midline measuring approximately 1.2 x 4.4 cm with surrounding mild vasogenic edema.  Repeat Head CT without contrast at 24 hours, showed no acute change.   Neurosx following  Neurochecks q4 on medicine floors  Maintain SBP<130/80, started on home med Metoprolol 25mg BID  MRI/MRA per neuro, hold aspirin and plavix for now.  F/u Neuro recs    PULMONARY:  No active issues    CARDIOLOGY:  Maintain SBP <130/80 for CTH findings  Started on Metoprolol 25mg BID (home med is daily)  Hold aspirin and plavix as per neurology recs  No active issues    GASTROINTESTINAL:  DASH diet    RENAL/:  BMP pending    INFECTIOUS DISEASE:  Afebrile, pending WBC    HEMATOLOGY:  CBC pending    ENDOCRINE  No active issues    PROPHYLAXIS  SCDs, hold off on pharmacological AC in setting of CTH findings    ETHICS/DISPOSITION:  - Code status to be discussed with HCP    LINES/DRAINS/TUBES/DEVICES:  - Peripheral lines      FOR FOLLOW UP:  - F/u neuro recs  - PT/OT  - Hold aspirin and plavix in setting of ICH  - F/u code status with HCP

## 2021-09-12 NOTE — CONSULT NOTE ADULT - ASSESSMENT
85 year old    Impression: ***    Plan: incomplete 85 year old (preferentially Cantonese-speaking) ***-handed male w/ PMHx HTN, disc herniation, who presented to [place] for bilateral frontal headache, and gait instability onset ~20:45PM 9/11/21; found to have acute intraparenchymal hemorrhage in the cerebellum at the midline and right of midline (1.2 x 4.4cm) w/ surrounding mild vasogenic edema; transferred to [facility] MICU for further management. Neurosurgery consulted 9/11, recommending rCTH in 24 hours, no role for angio, and Stroke Neuro consult. HTN at  w/ /87. VS on 9/12 wnl. Neurological examination notable for LUE and LLE ataxia, with mild/questionable LUE pronator drift.    NIHSS: ***2***    Impression: ***    Plan: incomplete 85 year old (preferentially Cantonese-speaking) ***-handed male w/ PMHx HTN, disc herniation, who presented to [place] for bilateral frontal headache, and gait instability onset ~20:45PM 9/11/21; found to have acute intraparenchymal hemorrhage in the cerebellum at the midline and right of midline (1.2 x 4.4cm) w/ surrounding mild vasogenic edema; transferred to [facility] MICU for further management. Neurosurgery consulted 9/11, recommending rCTH in 24 hours, no role for angio, and Stroke Neuro consult. HTN at  w/ /87. VS on 9/12 wnl. Neurological examination notable for LUE and LLE ataxia, with mild/questionable LUE pronator drift.     NIHSS: ***2***    Impression:     Plan: incomplete 85 year old (preferentially Cantonese-speaking) male w/ PMHx HTN, disc herniation, who presented to [place] for bilateral frontal headache, and gait instability onset ~20:45PM 9/11/21; found to have acute intraparenchymal hemorrhage in the cerebellum at the midline and right of midline (1.2 x 4.4cm) w/ surrounding mild vasogenic edema; transferred to [facility] MICU for further management. Neurosurgery consulted 9/11, recommending rCTH in 24 hours, no role for angio, and Stroke Neuro consult. HTN at  w/ /87. VS on 9/12 wnl. Neurological examination notable for b/l extremity ataxia, with mild/questionable LUE pronator drift.     LKW: 20:45PM 9/11/21  NIHSS: 2 (bilateral upper extremity ataxia, mild)  mRS: 3 (patient reports uses cane at home, able to walk with cane, able to walk without assistance from others, needs help from family regarding with finances and other affairs)  tPA not given -- outside of time window; also, pt p/w hemorrhagic stroke  Thrombectomy not performed -- no LVO    Impression: bifrontal headache w/ gait instability; ataxic on exam (upper > lower); acute intraparenchymal cerebellar hemorrhage at midline and right of midline (initially 1.2 x 4.4cm) w/ surrounding mild vasogenic edema on CTA; mechanism of cerebellar hemorrhage likely 2/2 hypertensive crisis    Plan:   -MRI, MRA  -ASA 81mg and clopidogrel 75mg for three weeks; then ASA 81mg alone daily  -blood pressure management: target <130/80    * Will discuss plan with Neurology Attending Dr. Schoenberg * 85 year old (preferentially Cantonese-speaking) male w/ PMHx HTN, disc herniation, who presented to [place] for bilateral frontal headache, and gait instability onset ~20:45PM 9/11/21; found to have acute intraparenchymal hemorrhage in the cerebellum at the midline and right of midline (1.2 x 4.4cm) w/ surrounding mild vasogenic edema; transferred to [facility] MICU for further management. Neurosurgery consulted 9/11, recommending rCTH in 24 hours, no role for angio, and Stroke Neuro consult. HTN at  w/ /87. VS on 9/12 wnl. Neurological examination notable for b/l extremity ataxia, with mild/questionable LUE pronator drift.     LKW: 20:45PM 9/11/21  NIHSS: 2 (bilateral upper extremity ataxia, mild)  mRS: 3 (patient reports uses cane at home, able to walk with cane, able to walk without assistance from others, needs help from family regarding with finances and other affairs)  tPA not given -- outside of time window; also, pt p/w hemorrhagic stroke  Thrombectomy not performed -- no LVO    Impression: bifrontal headache w/ gait instability; ataxic on exam (upper > lower); acute intraparenchymal cerebellar hemorrhage at midline and right of midline (initially 1.2 x 4.4cm) w/ surrounding mild vasogenic edema on CTA; mechanism of cerebellar hemorrhage likely 2/2 hypertensive crisis    Plan:   -MRI, MRA  -blood pressure management: target <130/80      * Will discuss plan with Neurology Attending Dr. Schoenberg * 85 year old (preferentially Cantonese-speaking) male w/ PMHx HTN, disc herniation, who presented to [place] for bilateral frontal headache, and gait instability onset ~20:45PM 9/11/21; found to have acute intraparenchymal hemorrhage in the cerebellum at the midline and right of midline (1.2 x 4.4cm) w/ surrounding mild vasogenic edema; transferred to [facility] MICU for further management. Neurosurgery consulted 9/11, recommending rCTH in 24 hours, no role for angio, and Stroke Neuro consult. HTN at  w/ /87. VS on 9/12 wnl. Neurological examination notable for b/l extremity ataxia, with mild/questionable LUE pronator drift.     LKW: 20:45PM 9/11/21  NIHSS: 2 (bilateral upper extremity ataxia, mild)  mRS: 3 (patient reports uses cane at home, able to walk with cane, able to walk without assistance from others, needs help from family regarding with finances and other affairs)  tPA not given -- outside of time window; also, pt p/w hemorrhagic stroke  Thrombectomy not performed -- no LVO    Impression: bifrontal headache w/ gait instability; ataxic on exam (upper > lower); acute intraparenchymal cerebellar hemorrhage at midline and right of midline (initially 1.2 x 4.4cm) w/ surrounding mild vasogenic edema on CTA; mechanism of cerebellar hemorrhage likely 2/2 hypertensive crisis    Plan:   -MRI, MRA  -okay for q4h neuro checks  -blood pressure management: target <130/80  -hold aspirin and clopidogrel for now      * Will discuss plan with Neurology Attending Dr. Schoenberg * 85 year old (preferentially Cantonese-speaking) male w/ PMHx HTN, disc herniation, who presented to [place] for bilateral frontal headache, and gait instability onset ~20:45PM 9/11/21; found to have acute intraparenchymal hemorrhage in the cerebellum at the midline and right of midline (1.2 x 4.4cm) w/ surrounding mild vasogenic edema; transferred to [facility] MICU for further management. Neurosurgery consulted 9/11, recommending rCTH in 24 hours, no role for angio, and Stroke Neuro consult. HTN at  w/ /87. VS on 9/12 wnl. Neurological examination notable for b/l extremity ataxia, with mild/questionable LUE pronator drift.     LKW: 20:45PM 9/11/21  NIHSS: 2 (bilateral upper extremity ataxia, mild)  mRS: 3 (patient reports uses cane at home, able to walk with cane, able to walk without assistance from others, needs help from family regarding with finances and other affairs)  tPA not given -- outside of time window; also, pt p/w hemorrhagic stroke  Thrombectomy not performed -- no LVO    Impression: bifrontal headache w/ gait instability; ataxic on exam (upper > lower); acute intraparenchymal cerebellar hemorrhage at midline and right of midline (initially 1.2 x 4.4cm) w/ surrounding mild vasogenic edema on CTA; mechanism of cerebellar hemorrhage likely 2/2 hypertensive crisis    Plan:   -MRI, MRA  -okay for q4h neuro checks  -blood pressure management: target <130/80  -hold aspirin and clopidogrel for now    * Case and plan discussed with Neurology Attending Dr. Schoenberg * 85 year old (preferentially Cantonese-speaking) male w/ PMHx HTN, disc herniation, who presented to [place] for bilateral frontal headache, and gait instability onset ~20:45PM 9/11/21; found to have acute intraparenchymal hemorrhage in the cerebellum at the midline and right of midline (1.2 x 4.4cm) w/ surrounding mild vasogenic edema; transferred to [facility] MICU for further management. Neurosurgery consulted 9/11, recommending rCTH in 24 hours, no role for angio, and Stroke Neuro consult. HTN at  w/ /87. VS on 9/12 wnl. Neurological examination notable for b/l extremity ataxia, with mild/questionable LUE pronator drift.     LKW: 20:45PM 9/11/21  NIHSS: 2 (bilateral upper extremity ataxia, mild)  mRS: 3 (patient reports uses cane at home, able to walk with cane, able to walk without assistance from others, needs help from family regarding with finances and other affairs)  tPA not given -- outside of time window; also, pt p/w hemorrhagic stroke  Thrombectomy not performed -- no LVO    Impression: bifrontal headache w/ gait instability; ataxic on exam (upper > lower); acute intraparenchymal cerebellar hemorrhage at midline and right of midline (initially 1.2 x 4.4cm) w/ surrounding mild vasogenic edema on CTA; mechanism of cerebellar hemorrhage likely 2/2 hypertensive crisis    Plan:   -MRI, MRA  -agree w/ transferring patient to the Medicine floors from MICU  -agree w/ liberalizing neuro checks to q4h from q1h    -blood pressure management: target <130/80  -hold aspirin and clopidogrel for now    * Case and plan discussed with Neurology Attending Dr. Schoenberg *

## 2021-09-12 NOTE — PROGRESS NOTE ADULT - ASSESSMENT
85 y/o male with pmh of BPH presenting to [facility] as a transfer from [facility] due to intraparenchymal bleed in the cerebellum resulting in MICU admission for further management of blood pressure control and neurochecks.    NEUROLOGY:  CTA Head and Neck with intraparenchymal hemorrhage of cerebellum  Neurosx following  Neurochecks q1  Maintain SBP<140, nicardipine gtt to maintain this goal  Repeat CTH for today at 9 AM (24 hours later)  Stroke neurology consulted    PULMONARY:  No active issues    CARDIOLOGY:  Maintain SBP <140 for CTH findings  No active issues    GASTROINTESTINAL:  DASH diet    RENAL/:  BMP pending    INFECTIOUS DISEASE:  Afebrile, pending WBC    HEMATOLOGY:  CBC pending    ENDOCRINE  No active issues    PROPHYLAXIS  SCDs, hold off on pharmacological AC in setting of CTH findings    ETHICS/DISPOSITION:  - Code status to be discussed with HCP    LINES/DRAINS/TUBES/DEVICES:  - Peripheral lines

## 2021-09-12 NOTE — PROGRESS NOTE ADULT - SUBJECTIVE AND OBJECTIVE BOX
INTERVAL HPI/OVERNIGHT EVENTS:    O/N: no events overnight.     SUBJECTIVE: Patient seen and examined at bedside. This morning patient complaining of front HA. Otherwise asymptomatic.         OBJECTIVE:    VITAL SIGNS:  ICU Vital Signs Last 24 Hrs  T(C): 36.4 (12 Sep 2021 08:00), Max: 36.6 (11 Sep 2021 20:01)  T(F): 97.5 (12 Sep 2021 08:00), Max: 97.9 (11 Sep 2021 20:01)  HR: 86 (12 Sep 2021 10:00) (71 - 100)  BP: 143/72 (12 Sep 2021 10:00) (101/67 - 167/78)  BP(mean): 87 (12 Sep 2021 10:00) (73 - 96)  ABP: --  ABP(mean): --  RR: 17 (12 Sep 2021 10:00) (16 - 22)  SpO2: 100% (12 Sep 2021 10:00) (98% - 100%)        09-11 @ 07:01  -  09-12 @ 07:00  --------------------------------------------------------  IN: 50 mL / OUT: 700 mL / NET: -650 mL    09-12 @ 07:01  -  09-12 @ 10:36  --------------------------------------------------------  IN: 25 mL / OUT: 90 mL / NET: -65 mL      CAPILLARY BLOOD GLUCOSE          PHYSICAL EXAM:    General: NAD  HEENT: NC/AT; PERRL, clear conjunctiva  Neck: supple  Respiratory: CTA b/l  Cardiovascular: +S1/S2; RRR  Abdomen: soft, NT/ND; +BS x4  Extremities: WWP, 2+ peripheral pulses b/l; no LE edema  Skin: normal color and turgor; no rash  Neurological: CN II-XII intact, motor strength 5/5 all extremities    MEDICATIONS:  MEDICATIONS  (STANDING):  chlorhexidine 4% Liquid 1 Application(s) Topical <User Schedule>  niCARdipine Infusion 5 mG/Hr (25 mL/Hr) IV Continuous <Continuous>    MEDICATIONS  (PRN):      ALLERGIES:  Allergies    No Known Allergies    Intolerances        LABS:                        13.2   10.05 )-----------( 225      ( 12 Sep 2021 06:11 )             38.0     09-12    136  |  97<L>  |  14  ----------------------------<  126<H>  3.4<L>   |  21<L>  |  0.62    Ca    9.5      12 Sep 2021 05:23  Phos  2.5     09-12  Mg     2.10     09-12    TPro  7.8  /  Alb  4.6  /  TBili  0.7  /  DBili  x   /  AST  30  /  ALT  17  /  AlkPhos  63  09-12    PT/INR - ( 11 Sep 2021 18:47 )   PT: 12.0 sec;   INR: 1.05 ratio         PTT - ( 11 Sep 2021 18:47 )  PTT:29.4 sec      RADIOLOGY & ADDITIONAL TESTS: Reviewed.

## 2021-09-12 NOTE — PROGRESS NOTE ADULT - ATTENDING COMMENTS
Acute cerebellar infarct.  CT head stable.  Transfer to floors once blood pressure stable off cardene gtt.

## 2021-09-12 NOTE — CONSULT NOTE ADULT - ATTENDING COMMENTS
85 y/o male (preferentially, Cantonese-speaking) with pmh of irregular heart rate, BPH, and disc herniation who presented to [facility] for nausea, headache, and the inability to walk on his own without feeling dizzy.    MRI MRA   apirin and plavix three weeks then aspirin 85 y/o male (preferentially, Cantonese-speaking) with pmh of irregular heart rate, BPH, and disc herniation who presented to [facility] for nausea, headache, and the inability to walk on his own without feeling dizzy.    MRI MRA   hold aspirin for now

## 2021-09-12 NOTE — CONSULT NOTE ADULT - SUBJECTIVE AND OBJECTIVE BOX
Neurology  Consult Note  09-12-21    Name:  MARIELY QUINTEROS; 85y (1935)    Reason for Admission: Nontraumatic intracerebral hemorrhage of cerebellum        Chief Complaint:  HPI:  "83 y/o male (preferentially, Cantonese-speaking) with pmh of irregular heart rate, BPH, and disc herniation who presented to [facility] for nausea, headache, and the inability to walk on his own without feeling dizzy. Last night, the pt around 8:45 pm was about to have dinner when he noticed he felt nauseous and had a frontal headache and it was hard for him to get up. His family had to help him to get up and go to his bed with his cane. While laying in bed, his nausea was gone, however his headache was persistent. The pt and his family decided that in the morning they would go to his PCP for further care. However given his persistent nausea and bilateral frontal headache in the morning, they called 911 and went to the ED."  (11 Sep 2021 19:01)    Neurology consulted for 85 year old male w/ PMHx BPH, HTN, disc herniation as a transfer from [place] for nausea, headache, and inability to walk not a/w dizziness, found to have cerebellar bleed. Per family, patient c/o bilateral frontal headache at 8:45PM yesterday at dinner. He went to rest later than night as headache persisted. Nausea and headache onset associated w/ gait instability. He denies recent trauma, falls, fevers, chills, cough, chest pain, SOB, abdominal pain, n/v/d/c, hematochezia, dysuria.     ED VS: 97.6 F, 71, 167/78, 18, and 98% on RA. ED tx: no current treatment started.    Review of Systems:  As states in HPI.      PMHx:   No pertinent past medical history  BPH (benign prostatic hyperplasia)  Irregular heart rate  Lumbar disc herniation      PFHx:   No pertinent family history in first degree relatives      PSuHx:   No significant past surgical history        Medications:  MEDICATIONS  (STANDING):  chlorhexidine 4% Liquid 1 Application(s) Topical <User Schedule>  niCARdipine Infusion 5 mG/Hr (25 mL/Hr) IV Continuous <Continuous>    MEDICATIONS  (PRN):      Vitals:  T(C): 36.4 (09-12-21 @ 08:00), Max: 36.6 (09-11-21 @ 20:01)  HR: 72 (09-12-21 @ 11:00) (71 - 100)  BP: 110/60 (09-12-21 @ 11:00) (101/67 - 167/78)  RR: 18 (09-12-21 @ 11:00) (16 - 22)  SpO2: 100% (09-12-21 @ 11:00) (98% - 100%)    Physical Examination: INCOMPLETE  Neurologic:    - Mental Status: Alert, awake, oriented to person, place, and time;    - Cranial Nerves:  II: Visual fields are full to confrontation; Pupils are equal, round, and reactive to light;   III, IV, VI: Extraocular movements are intact without nystagmus.  V: Facial sensation is intact in the V1-V3 distribution bilaterally.  VII: Face is symmetric with normal eye closure and smile.  VIII: Hearing is intact to conversation.  IX, X: Uvula is midline and soft palate rises symmetrically.  XI: Head turning and shoulder shrug are intact.  XII: Tongue protrudes in the midline.    -Motor/Strength Testing: incomplete                                 Right           Left  Deltoid                     5                 5  Biceps                      5                 5  Triceps                     5                 5    Hip Flex                   5                  5  Knee Flex                 5                  5  Knee Ext	      5                  5  Dorsiflex                  5                  5  Plantarflex               5                  5    -Mild left pronator drift. Normal muscle bulk throughout.    - Reflexes:   Bicep (C5/C6):                  R 2+ --- L 2+   Brachioradialis (C5/C6) :   R 2+ --- L 2+   Patella (L3/L4) :                 R 2+ --- L 2+   Ankle (S1) :                       R 2+ --- L 2+     -Plant responses down bilaterally.    - Sensory: Intact throughout to light touch.  - Coordination: Finger-nose-finger and heel-examiner's hand ataxia on the left (upper and lower).  - Gait: Deferred.    Labs:                        13.2   10.05 )-----------( 225      ( 12 Sep 2021 06:11 )             38.0     09-12    136  |  97<L>  |  14  ----------------------------<  126<H>  3.4<L>   |  21<L>  |  0.62    Ca    9.5      12 Sep 2021 05:23  Phos  2.5     09-12  Mg     2.10     09-12    TPro  7.8  /  Alb  4.6  /  TBili  0.7  /  DBili  x   /  AST  30  /  ALT  17  /  AlkPhos  63  09-12    CAPILLARY BLOOD GLUCOSE        LIVER FUNCTIONS - ( 12 Sep 2021 05:23 )  Alb: 4.6 g/dL / Pro: 7.8 g/dL / ALK PHOS: 63 U/L / ALT: 17 U/L / AST: 30 U/L / GGT: x             PT/INR - ( 11 Sep 2021 18:47 )   PT: 12.0 sec;   INR: 1.05 ratio         PTT - ( 11 Sep 2021 18:47 )  PTT:29.4 sec    Radiology:   No imaging in PACS at this time -- per MICU note:  CTA Head and Neck demonstrated        1. Right carotid system:  No hemodynamically significant stenosis.        2.   Left carotid system:  No hemodynamically significant stenosis.        3.   Intracranial circulation: No aneurysm or AVM. No hemodynamically significant stenosis.        4.   Brain:  There is acute intraparenchymal hemorrhage in the cerebellum at the midline and right of midline measuring approximately 1.2 x 4.4 cm with surrounding mild vasogenic edema.   Neurology  Consult Note  09-12-21    Name:  MARIELY QUINTEROS; 85y (1935)    Reason for Admission: Nontraumatic intracerebral hemorrhage of cerebellum    HPI:  "85 y/o male (preferentially, Cantonese-speaking) with pmh of irregular heart rate, BPH, and disc herniation who presented to [facility] for nausea, headache, and the inability to walk on his own without feeling dizzy. Last night, the pt around 8:45 pm was about to have dinner when he noticed he felt nauseous and had a frontal headache and it was hard for him to get up. His family had to help him to get up and go to his bed with his cane. While laying in bed, his nausea was gone, however his headache was persistent. The pt and his family decided that in the morning they would go to his PCP for further care. However given his persistent nausea and bilateral frontal headache in the morning, they called 911 and went to the ED."  (11 Sep 2021 19:01)    Neurology consulted for 85 year old male w/ PMHx BPH, HTN, disc herniation as a transfer from [place] for ?nausea, headache, and inability to walk not a/w dizziness, found to have cerebellar bleed. Per family, patient c/o bilateral frontal headache at 8:45PM yesterday at dinner. He went to rest later than night as headache persisted. Nausea and headache onset associated w/ gait instability. He denies recent trauma, fevers, chills, cough, chest pain, SOB, abdominal pain, n/v/d/c, hematochezia, dysuria.     Of note, ED ADULT Nurse note: patient had stated that he had a fall, but denied hitting his head.    ED VS: 97.6 F, 71, 167/78, 18, and 98% on RA. ED tx: no current treatment started.    Review of Systems:  As states in HPI.      PMHx:   No pertinent past medical history  BPH (benign prostatic hyperplasia)  Irregular heart rate  Lumbar disc herniation      PFHx:   No pertinent family history in first degree relatives      PSuHx:   No significant past surgical history        Medications:  MEDICATIONS  (STANDING):  chlorhexidine 4% Liquid 1 Application(s) Topical <User Schedule>  niCARdipine Infusion 5 mG/Hr (25 mL/Hr) IV Continuous <Continuous>    MEDICATIONS  (PRN):      Vitals:  T(C): 36.4 (09-12-21 @ 08:00), Max: 36.6 (09-11-21 @ 20:01)  HR: 72 (09-12-21 @ 11:00) (71 - 100)  BP: 110/60 (09-12-21 @ 11:00) (101/67 - 167/78)  RR: 18 (09-12-21 @ 11:00) (16 - 22)  SpO2: 100% (09-12-21 @ 11:00) (98% - 100%)    Physical Examination: INCOMPLETE  Neurologic:    - Mental Status: Alert, awake, oriented to person, place, and time;    - Cranial Nerves:  II: Visual fields are full to confrontation; Pupils are equal, round, and reactive to light;   III, IV, VI: Extraocular movements are intact without nystagmus.  V: Facial sensation is intact in the V1-V3 distribution bilaterally.  VII: Face is symmetric with normal eye closure and smile.  VIII: Hearing is intact to conversation.  IX, X: Uvula is midline and soft palate rises symmetrically.  XI: Head turning and shoulder shrug are intact.  XII: Tongue protrudes in the midline.    -Motor/Strength Testing: incomplete                                 Right           Left  Deltoid                     5                 5  Biceps                      5                 5  Triceps                     5                 5    Hip Flex                   5                  5  Knee Flex                 5                  5  Knee Ext	      5                  5  Dorsiflex                  5                  5  Plantarflex               5                  5    -Mild left pronator drift. Normal muscle bulk throughout.    - Reflexes:   Bicep (C5/C6):                  R 2+ --- L 2+   Brachioradialis (C5/C6) :   R 2+ --- L 2+   Patella (L3/L4) :                 R 2+ --- L 2+   Ankle (S1) :                       R 2+ --- L 2+     -Plant responses down bilaterally.    - Sensory: Intact throughout to light touch.  - Coordination: Finger-nose-finger and heel-examiner's hand ataxia on the left (upper and lower).  - Gait: Deferred.    Labs:                        13.2   10.05 )-----------( 225      ( 12 Sep 2021 06:11 )             38.0     09-12    136  |  97<L>  |  14  ----------------------------<  126<H>  3.4<L>   |  21<L>  |  0.62    Ca    9.5      12 Sep 2021 05:23  Phos  2.5     09-12  Mg     2.10     09-12    TPro  7.8  /  Alb  4.6  /  TBili  0.7  /  DBili  x   /  AST  30  /  ALT  17  /  AlkPhos  63  09-12    CAPILLARY BLOOD GLUCOSE        LIVER FUNCTIONS - ( 12 Sep 2021 05:23 )  Alb: 4.6 g/dL / Pro: 7.8 g/dL / ALK PHOS: 63 U/L / ALT: 17 U/L / AST: 30 U/L / GGT: x             PT/INR - ( 11 Sep 2021 18:47 )   PT: 12.0 sec;   INR: 1.05 ratio         PTT - ( 11 Sep 2021 18:47 )  PTT:29.4 sec    Radiology:   No imaging in PACS at this time -- per MICU note:  CTA Head and Neck demonstrated        1. Right carotid system:  No hemodynamically significant stenosis.        2.   Left carotid system:  No hemodynamically significant stenosis.        3.   Intracranial circulation: No aneurysm or AVM. No hemodynamically significant stenosis.        4.   Brain:  There is acute intraparenchymal hemorrhage in the cerebellum at the midline and right of midline measuring approximately 1.2 x 4.4 cm with surrounding mild vasogenic edema.   Neurology  Consult Note  09-12-21    Name:  MARIELY QUINTEROS; 85y (1935)    Reason for Admission: Nontraumatic intracerebral hemorrhage of cerebellum    HPI:  "85 y/o male (preferentially, Cantonese-speaking) with pmh of irregular heart rate, BPH, and disc herniation who presented to [facility] for nausea, headache, and the inability to walk on his own without feeling dizzy. Last night, the pt around 8:45 pm was about to have dinner when he noticed he felt nauseous and had a frontal headache and it was hard for him to get up. His family had to help him to get up and go to his bed with his cane. While laying in bed, his nausea was gone, however his headache was persistent. The pt and his family decided that in the morning they would go to his PCP for further care. However given his persistent nausea and bilateral frontal headache in the morning, they called 911 and went to the ED."  (11 Sep 2021 19:01)    Neurology consulted for 85 year old male w/ PMHx BPH, HTN, disc herniation as a transfer from [place] for ?nausea, headache, and inability to walk not a/w dizziness, found to have cerebellar bleed. Per family, patient c/o bilateral frontal headache at 8:45PM yesterday at dinner. He went to rest later than night as headache persisted. Nausea and headache onset associated w/ gait instability. He denies recent trauma, fevers, chills, cough, chest pain, SOB, abdominal pain, n/v/d/c, hematochezia, dysuria.     Of note, ED ADULT Nurse note: patient had stated that he had a fall, but denied hitting his head.    ED VS: 97.6 F, 71, 167/78, 18, and 98% on RA. ED tx: no current treatment started.    Review of Systems:  As states in HPI.      PMHx:   No pertinent past medical history  BPH (benign prostatic hyperplasia)  Irregular heart rate  Lumbar disc herniation      PFHx:   No pertinent family history in first degree relatives      PSuHx:   No significant past surgical history        Medications:  MEDICATIONS  (STANDING):  chlorhexidine 4% Liquid 1 Application(s) Topical <User Schedule>  niCARdipine Infusion 5 mG/Hr (25 mL/Hr) IV Continuous <Continuous>    MEDICATIONS  (PRN):      Vitals:  T(C): 36.4 (09-12-21 @ 08:00), Max: 36.6 (09-11-21 @ 20:01)  HR: 72 (09-12-21 @ 11:00) (71 - 100)  BP: 110/60 (09-12-21 @ 11:00) (101/67 - 167/78)  RR: 18 (09-12-21 @ 11:00) (16 - 22)  SpO2: 100% (09-12-21 @ 11:00) (98% - 100%)    Physical Examination:   Neurologic:    - Mental Status: Alert, awake, oriented to person, place, and time;    - Cranial Nerves:  II: Visual fields are full to confrontation; Pupils are equal, round, and reactive to light;   III, IV, VI: Extraocular movements are intact without nystagmus.  V: Facial sensation is intact in the V1-V3 distribution bilaterally.  VII: Face is symmetric with normal eye closure and smile.  VIII: Hearing is intact to conversation.  IX, X: Uvula is midline and soft palate rises symmetrically.  XI: Head turning and shoulder shrug are intact.  XII: Tongue protrudes in the midline.    -Motor/Strength Testing:                                  Right           Left  Deltoid                     5                 5  Biceps                      5                 5  Triceps                     5                 5  Hip Flex                   5                  5  Knee Flex                 5                  5  Dorsiflex                  5                  5  Plantarflex               5                  5    -Mild left pronator drift. Normal muscle bulk throughout.    - Reflexes:   Bicep (C5/C6):                  R 2+ --- L 2+   Brachioradialis (C5/C6) :   R 2+ --- L 2+   Patella (L3/L4) :                 R 2+ --- L 2+   Ankle (S1) :                       R 2+ --- L 2+     -Plant responses down bilaterally.    - Sensory: Intact throughout to light touch.  - Coordination: Finger-nose-finger and heel-examiner's hand ataxia on the left (upper and lower).  - Gait: Deferred.    Labs:                        13.2   10.05 )-----------( 225      ( 12 Sep 2021 06:11 )             38.0     09-12    136  |  97<L>  |  14  ----------------------------<  126<H>  3.4<L>   |  21<L>  |  0.62    Ca    9.5      12 Sep 2021 05:23  Phos  2.5     09-12  Mg     2.10     09-12    TPro  7.8  /  Alb  4.6  /  TBili  0.7  /  DBili  x   /  AST  30  /  ALT  17  /  AlkPhos  63  09-12    CAPILLARY BLOOD GLUCOSE        LIVER FUNCTIONS - ( 12 Sep 2021 05:23 )  Alb: 4.6 g/dL / Pro: 7.8 g/dL / ALK PHOS: 63 U/L / ALT: 17 U/L / AST: 30 U/L / GGT: x             PT/INR - ( 11 Sep 2021 18:47 )   PT: 12.0 sec;   INR: 1.05 ratio         PTT - ( 11 Sep 2021 18:47 )  PTT:29.4 sec    Radiology:   No imaging in PACS at this time -- per MICU note:  CTA Head and Neck demonstrated        1. Right carotid system:  No hemodynamically significant stenosis.        2.   Left carotid system:  No hemodynamically significant stenosis.        3.   Intracranial circulation: No aneurysm or AVM. No hemodynamically significant stenosis.        4.   Brain:  There is acute intraparenchymal hemorrhage in the cerebellum at the midline and right of midline measuring approximately 1.2 x 4.4 cm with surrounding mild vasogenic edema.   Neurology  Consult Note  09-12-21    Name:  MARIELY QUINTEROS; 85y (1935)    Reason for Admission: Nontraumatic intracerebral hemorrhage of cerebellum     ID: [id number] [facility]    HPI:  "85 y/o male (preferentially, Cantonese-speaking) with pmh of irregular heart rate, BPH, and disc herniation who presented to [facility] for nausea, headache, and the inability to walk on his own without feeling dizzy. Last night, the pt around 8:45 pm was about to have dinner when he noticed he felt nauseous and had a frontal headache and it was hard for him to get up. His family had to help him to get up and go to his bed with his cane. While laying in bed, his nausea was gone, however his headache was persistent. The pt and his family decided that in the morning they would go to his PCP for further care. However given his persistent nausea and bilateral frontal headache in the morning, they called 911 and went to the ED."  (11 Sep 2021 19:01)    Neurology consulted for 85 year old male w/ PMHx BPH, HTN, disc herniation as a transfer from [place] for nausea, headache, and inability to walk not a/w dizziness, found to have cerebellar bleed. Per family, patient c/o bilateral frontal headache at 8:45PM yesterday at dinner. He went to rest later than night as headache persisted. Nausea and headache onset associated w/ gait instability. Of note, patient denied nausea (denied that he felt as if he was going to vomit), and denied room-spinning/dizziness. He denies recent trauma, fevers, chills, cough, chest pain, SOB, abdominal pain, n/v/d/c, hematochezia, dysuria. Patient denies history of stroke in the past. He uses a cane at home.    Of note, ED ADULT Nurse note: patient had stated that he had a fall, but denied hitting his head.    ED VS: 97.6 F, 71, 167/78, 18, and 98% on RA. ED tx: no current treatment started.    Review of Systems:  As states in HPI.      PMHx:   No pertinent past medical history  BPH (benign prostatic hyperplasia)  Irregular heart rate  Lumbar disc herniation      PFHx:   No pertinent family history in first degree relatives      PSuHx:   No significant past surgical history        Medications:  MEDICATIONS  (STANDING):  chlorhexidine 4% Liquid 1 Application(s) Topical <User Schedule>  niCARdipine Infusion 5 mG/Hr (25 mL/Hr) IV Continuous <Continuous>    MEDICATIONS  (PRN):      Vitals:  T(C): 36.4 (09-12-21 @ 08:00), Max: 36.6 (09-11-21 @ 20:01)  HR: 72 (09-12-21 @ 11:00) (71 - 100)  BP: 110/60 (09-12-21 @ 11:00) (101/67 - 167/78)  RR: 18 (09-12-21 @ 11:00) (16 - 22)  SpO2: 100% (09-12-21 @ 11:00) (98% - 100%)    Physical Examination:   Neurologic:    - Mental Status: Alert, awake, oriented to person, place, and time;     - Cranial Nerves:  II: Visual fields are full to confrontation; Pupils are equal, round, and reactive to light;   III, IV, VI: Extraocular movements are intact without nystagmus.  V: Facial sensation is intact in the V1-V3 distribution bilaterally.  VII: Face is symmetric with normal eye closure and smile.  VIII: Hearing is intact to conversation.  IX, X: Uvula is midline and soft palate rises symmetrically.  XI: Head turning and shoulder shrug are intact.  XII: Tongue protrudes in the midline.    -Motor/Strength Testing:                                  Right           Left  Deltoid                     5                 5  Biceps                      5                 5  Triceps                     5                 5  Hip Flex                   5                  5  Knee Flex                 5                  5  Dorsiflex                  5                  5  Plantarflex               5                  5    -Mild left pronator drift. Normal muscle bulk throughout.    - Reflexes:   Bicep (C5/C6):                  R 2+ --- L 2+   Brachioradialis (C5/C6) :   R 2+ --- L 2+   Patella (L3/L4) :                 R 2+ --- L 2+   Ankle (S1) :                       R 2+ --- L 2+     -Plant responses down bilaterally.    - Sensory: Intact throughout to light touch.  - Coordination: bilateral finger-nose-finger and heel-shin ataxia (exam in lower extremities somewhat limited due to some difficulty following heel-shin command, despite use of )  - Gait: Deferred.    Labs:                        13.2   10.05 )-----------( 225      ( 12 Sep 2021 06:11 )             38.0     09-12    136  |  97<L>  |  14  ----------------------------<  126<H>  3.4<L>   |  21<L>  |  0.62    Ca    9.5      12 Sep 2021 05:23  Phos  2.5     09-12  Mg     2.10     09-12    TPro  7.8  /  Alb  4.6  /  TBili  0.7  /  DBili  x   /  AST  30  /  ALT  17  /  AlkPhos  63  09-12    CAPILLARY BLOOD GLUCOSE        LIVER FUNCTIONS - ( 12 Sep 2021 05:23 )  Alb: 4.6 g/dL / Pro: 7.8 g/dL / ALK PHOS: 63 U/L / ALT: 17 U/L / AST: 30 U/L / GGT: x             PT/INR - ( 11 Sep 2021 18:47 )   PT: 12.0 sec;   INR: 1.05 ratio         PTT - ( 11 Sep 2021 18:47 )  PTT:29.4 sec    Radiology:   No imaging in PACS at this time -- per MICU note:  CTA Head and Neck demonstrated        1. Right carotid system:  No hemodynamically significant stenosis.        2.   Left carotid system:  No hemodynamically significant stenosis.        3.   Intracranial circulation: No aneurysm or AVM. No hemodynamically significant stenosis.        4.   Brain:  There is acute intraparenchymal hemorrhage in the cerebellum at the midline and right of midline measuring approximately 1.2 x 4.4 cm with surrounding mild vasogenic edema.    CT Head No Cont (09.12.21 @ 12:14)  FINDINGS:    Redemonstration of an acute intraparenchymal hemorrhage within the midline and right cerebellum measuring 1.5 x 4.5 cm with mild surrounding vasogenic edema, grossly unchanged. No midline shift.    There are mild chronic white matter microvascular ischemic changes.    Prominence of the ventricles and sulci, compatible with age related volume loss.    The calvarium is intact.    The visualized portions of the paranasal sinuses and mastoid air cells are clear.    The bilateral orbits are unremarkable.    IMPRESSION:  Acute cerebellar hemorrhage, unchanged from 9/11/2021.    < end of copied text >

## 2021-09-13 LAB
ALBUMIN SERPL ELPH-MCNC: 4.1 G/DL — SIGNIFICANT CHANGE UP (ref 3.3–5)
ALP SERPL-CCNC: 59 U/L — SIGNIFICANT CHANGE UP (ref 40–120)
ALT FLD-CCNC: 18 U/L — SIGNIFICANT CHANGE UP (ref 4–41)
ANION GAP SERPL CALC-SCNC: 12 MMOL/L — SIGNIFICANT CHANGE UP (ref 7–14)
AST SERPL-CCNC: 34 U/L — SIGNIFICANT CHANGE UP (ref 4–40)
BILIRUB SERPL-MCNC: 0.8 MG/DL — SIGNIFICANT CHANGE UP (ref 0.2–1.2)
BUN SERPL-MCNC: 21 MG/DL — SIGNIFICANT CHANGE UP (ref 7–23)
CALCIUM SERPL-MCNC: 9.3 MG/DL — SIGNIFICANT CHANGE UP (ref 8.4–10.5)
CHLORIDE SERPL-SCNC: 101 MMOL/L — SIGNIFICANT CHANGE UP (ref 98–107)
CO2 SERPL-SCNC: 22 MMOL/L — SIGNIFICANT CHANGE UP (ref 22–31)
CREAT SERPL-MCNC: 0.68 MG/DL — SIGNIFICANT CHANGE UP (ref 0.5–1.3)
GLUCOSE SERPL-MCNC: 106 MG/DL — HIGH (ref 70–99)
HCT VFR BLD CALC: 36.9 % — LOW (ref 39–50)
HGB BLD-MCNC: 12.9 G/DL — LOW (ref 13–17)
MAGNESIUM SERPL-MCNC: 2.3 MG/DL — SIGNIFICANT CHANGE UP (ref 1.6–2.6)
MCHC RBC-ENTMCNC: 33.2 PG — SIGNIFICANT CHANGE UP (ref 27–34)
MCHC RBC-ENTMCNC: 35 GM/DL — SIGNIFICANT CHANGE UP (ref 32–36)
MCV RBC AUTO: 94.9 FL — SIGNIFICANT CHANGE UP (ref 80–100)
NRBC # BLD: 0 /100 WBCS — SIGNIFICANT CHANGE UP
NRBC # FLD: 0 K/UL — SIGNIFICANT CHANGE UP
PHOSPHATE SERPL-MCNC: 2.6 MG/DL — SIGNIFICANT CHANGE UP (ref 2.5–4.5)
PLATELET # BLD AUTO: 204 K/UL — SIGNIFICANT CHANGE UP (ref 150–400)
POTASSIUM SERPL-MCNC: 3.8 MMOL/L — SIGNIFICANT CHANGE UP (ref 3.5–5.3)
POTASSIUM SERPL-SCNC: 3.8 MMOL/L — SIGNIFICANT CHANGE UP (ref 3.5–5.3)
PROT SERPL-MCNC: 7.3 G/DL — SIGNIFICANT CHANGE UP (ref 6–8.3)
RBC # BLD: 3.89 M/UL — LOW (ref 4.2–5.8)
RBC # FLD: 12.3 % — SIGNIFICANT CHANGE UP (ref 10.3–14.5)
SODIUM SERPL-SCNC: 135 MMOL/L — SIGNIFICANT CHANGE UP (ref 135–145)
WBC # BLD: 7.71 K/UL — SIGNIFICANT CHANGE UP (ref 3.8–10.5)
WBC # FLD AUTO: 7.71 K/UL — SIGNIFICANT CHANGE UP (ref 3.8–10.5)

## 2021-09-13 PROCEDURE — 99232 SBSQ HOSP IP/OBS MODERATE 35: CPT | Mod: GC

## 2021-09-13 PROCEDURE — 99233 SBSQ HOSP IP/OBS HIGH 50: CPT

## 2021-09-13 RX ORDER — SENNA PLUS 8.6 MG/1
2 TABLET ORAL AT BEDTIME
Refills: 0 | Status: DISCONTINUED | OUTPATIENT
Start: 2021-09-13 | End: 2021-09-17

## 2021-09-13 RX ORDER — POLYETHYLENE GLYCOL 3350 17 G/17G
17 POWDER, FOR SOLUTION ORAL DAILY
Refills: 0 | Status: DISCONTINUED | OUTPATIENT
Start: 2021-09-13 | End: 2021-09-17

## 2021-09-13 RX ORDER — ACETAMINOPHEN 500 MG
650 TABLET ORAL ONCE
Refills: 0 | Status: COMPLETED | OUTPATIENT
Start: 2021-09-13 | End: 2021-09-13

## 2021-09-13 RX ADMIN — FINASTERIDE 5 MILLIGRAM(S): 5 TABLET, FILM COATED ORAL at 12:12

## 2021-09-13 RX ADMIN — SENNA PLUS 2 TABLET(S): 8.6 TABLET ORAL at 21:18

## 2021-09-13 RX ADMIN — CHLORHEXIDINE GLUCONATE 1 APPLICATION(S): 213 SOLUTION TOPICAL at 06:55

## 2021-09-13 RX ADMIN — TAMSULOSIN HYDROCHLORIDE 0.4 MILLIGRAM(S): 0.4 CAPSULE ORAL at 21:18

## 2021-09-13 RX ADMIN — Medication 25 MILLIGRAM(S): at 18:36

## 2021-09-13 RX ADMIN — Medication 25 MILLIGRAM(S): at 07:16

## 2021-09-13 RX ADMIN — POLYETHYLENE GLYCOL 3350 17 GRAM(S): 17 POWDER, FOR SOLUTION ORAL at 21:17

## 2021-09-13 RX ADMIN — Medication 650 MILLIGRAM(S): at 13:04

## 2021-09-13 RX ADMIN — Medication 650 MILLIGRAM(S): at 12:34

## 2021-09-13 NOTE — PROGRESS NOTE ADULT - ASSESSMENT
85 yr old male w/ R cerebellar hemorrhage (hypertension related likely) with rpt cth showing stable hemorrhage    PLAN:   - stable scan, ok to downgrade neuro checks to Q4 hr   - remainder of care per neurology   - No role for angiogram (d/w INR attending)  - no acute neurosurgical intervention       Case discussed with attending neurosurgeon.

## 2021-09-13 NOTE — PHYSICAL THERAPY INITIAL EVALUATION ADULT - GENERAL OBSERVATIONS, REHAB EVAL
Pt encountered in semisupine position, no distress, AxOx4, with +IV, +bundy, +tele, and +pulse oximeter.

## 2021-09-13 NOTE — PHYSICAL THERAPY INITIAL EVALUATION ADULT - PATIENT PROFILE REVIEW, REHAB EVAL
ACTIVITY: Increase as Tolerated; spoke with RN Sherly Hays prior to PT evaluation--> Pt OK for PT consult/yes

## 2021-09-13 NOTE — PROGRESS NOTE ADULT - ATTENDING COMMENTS
84 M BPH here with intraparenchymal bleed in cerebellum here for BP control and neurochecks    repeat imaging stable  c/w bp control for htn  f/u neuro reccs

## 2021-09-13 NOTE — PHYSICAL THERAPY INITIAL EVALUATION ADULT - PLANNED THERAPY INTERVENTIONS, PT EVAL
balance training/bed mobility training/gait training/motor coordination training/neuromuscular re-education/strengthening/transfer training

## 2021-09-13 NOTE — PHYSICAL THERAPY INITIAL EVALUATION ADULT - ADDITIONAL COMMENTS
Information regarding pt's prior level of function needs to be verified. As per pt he lives in a private house with his wife with a flight of stairs to negotiate to bedroom. Prior to hospital admission pt was ambulating independently using a single axis cane. Pt reports that he has had a recent fall although unsure when exactly.     Pt left comfortable in bed, NAD, all lines intact, all precautions maintained, with call bell in reach, and RN aware of PT evaluation.

## 2021-09-13 NOTE — OCCUPATIONAL THERAPY INITIAL EVALUATION ADULT - ADDITIONAL COMMENTS
Patient lives in a house with his wife with stairs. Reports using a cane prior to hospitalization. Prior to hospital admission pt was ambulating independently using a single axis cane.

## 2021-09-13 NOTE — PHYSICAL THERAPY INITIAL EVALUATION ADULT - PERTINENT HX OF CURRENT PROBLEM, REHAB EVAL
85 y/o male with pmh of BPH presenting to [facility] as a transfer from [facility] due to intraparenchymal bleed in the cerebellum resulting in MICU admission for further management of blood pressure control and neurochecks

## 2021-09-13 NOTE — OCCUPATIONAL THERAPY INITIAL EVALUATION ADULT - PLANNED THERAPY INTERVENTIONS, OT EVAL
ADL retraining/IADL retraining/balance training/bed mobility training/strengthening/stretching/transfer training

## 2021-09-13 NOTE — PROGRESS NOTE ADULT - ASSESSMENT
83 y/o male with pmh of BPH presenting to [facility] as a transfer from [facility] due to intraparenchymal bleed in the cerebellum resulting in MICU admission for further management of blood pressure control and neuro checks.    NEUROLOGY:  CTA Head and Neck with intraparenchymal hemorrhage of cerebellum performed on 9/11.   Showed no significant stenosis in the R and L carotid. Acute intraparenchymal hemorrhage in the cerebellum at the midline and right of midline measuring approximately 1.2 x 4.4 cm with surrounding mild vasogenic edema.  Repeat Head CT without contrast at 24 hours, showed no acute change.   Neurosx following  Neurochecks q4 on medicine floors  Maintain SBP<130/80, started on home med Metoprolol 25mg BID  MRI/MRA per neuro, hold aspirin and plavix for now.  F/u Neuro recs    PULMONARY:  No active issues    CARDIOLOGY:  Maintain SBP <130/80 for CTH findings  Started on Metoprolol 25mg BID (home med is daily)  Hold aspirin and plavix as per neurology recs  No active issues    GASTROINTESTINAL:  DASH diet    RENAL/:  BMP pending    INFECTIOUS DISEASE:  Afebrile, pending WBC    HEMATOLOGY:  CBC pending    ENDOCRINE  No active issues    PROPHYLAXIS  SCDs, hold off on pharmacological AC in setting of CTH findings    ETHICS/DISPOSITION:  - Code status to be discussed with HCP    LINES/DRAINS/TUBES/DEVICES:  - Peripheral lines      FOR FOLLOW UP:  - F/u neuro recs  - PT/OT  - Hold aspirin and plavix in setting of ICH  - F/u code status with HCP.     85 y/o male with pmh of BPH presenting to [facility] as a transfer from [facility] due to intraparenchymal bleed in the cerebellum resulting in MICU admission for further management of blood pressure control and neuro checks.    NEUROLOGY:  CTA Head and Neck with intraparenchymal hemorrhage of cerebellum performed on 9/11.   Showed no significant stenosis in the R and L carotid. Acute intraparenchymal hemorrhage in the cerebellum at the midline and right of midline measuring approximately 1.2 x 4.4 cm with surrounding mild vasogenic edema.  Repeat Head CT without contrast at 24 hours, showed no acute change.   Neurosx following  Neurochecks q4 on medicine floors  Maintain SBP<130/80, started on home med Metoprolol 25mg BID  MRI/MRA per neuro, hold aspirin and plavix for now.  F/u Neuro recs    PULMONARY:  No active issues    CARDIOLOGY:  Maintain SBP <130/80 for CTH findings  Started on Metoprolol 25mg BID (home med is daily)  Hold aspirin and plavix as per neurology recs  No active issues    GASTROINTESTINAL:  DASH diet    RENAL/:  BMP pending    INFECTIOUS DISEASE:  Afebrile, pending WBC    HEMATOLOGY:  CBC pending    ENDOCRINE  No active issues    PROPHYLAXIS  SCDs, hold off on pharmacological AC in setting of CTH findings    ETHICS/DISPOSITION:  - Code status to be discussed with HCP    LINES/DRAINS/TUBES/DEVICES:  - Peripheral lines    FOR FOLLOW UP:  - F/u neuro recs  - PT/OT  - Hold aspirin and plavix in setting of ICH  - F/u code status with HCP.

## 2021-09-13 NOTE — CHART NOTE - NSCHARTNOTEFT_GEN_A_CORE
MAR Accept Note  Toro Silva MD, PGY3    Transfer to:  Medicine  Accepting Attending Physician:  Dr Corona  Assigned Room:  441B    Patient seen and examined.   Labs and data reviewed.   No findings precluding transfer of service.       HPI/MICU COURSE:   Please refer to MICU transfer note for full details. Briefly, this is a 83 y/o male with pmh of BPH presenting to [facility] as a transfer from [facility] due to intraparenchymal bleed in the cerebellum resulting in MICU admission for further management of blood pressure control and neuro checks. CTA Head and Neck with intraparenchymal hemorrhage of cerebellum performed on 9/11 showed acute intraparenchymal hemorrhage in the cerebellum at the midline and right of midline measuring approximately 1.2 x 4.4 cm with surrounding mild vasogenic edema. Neurosurgery consulted, no intervention. 24-hour interval CTc stable. MRI/MRA pending. Stable for transfer to medical floor.      FOR FOLLOW-UP:    [ ] F/u neuro recs  [ ] PT/OT  [ ] Hold aspirin and plavix in setting of ICH  [ ] F/u code status with HCP.    Toro Silva, PGY3  MAR [id number] (NS) / [id number] ([facility]) MAR Accept Note  Toro Silva MD, PGY3    Transfer to:  Medicine  Accepting Attending Physician:  Dr Corona  Assigned Room:  441B    Patient seen and examined.   Labs and data reviewed.   No findings precluding transfer of service.       HPI/MICU COURSE:   Please refer to MICU transfer note for full details. Briefly, this is a 85 y/o male with pmh of BPH presenting to [facility] as a transfer from [facility] due to intraparenchymal bleed in the cerebellum resulting in MICU admission for further management of blood pressure control and neuro checks. CTA Head and Neck with intraparenchymal hemorrhage of cerebellum performed on 9/11 showed acute intraparenchymal hemorrhage in the cerebellum at the midline and right of midline measuring approximately 1.2 x 4.4 cm with surrounding mild vasogenic edema. Neurosurgery consulted, no intervention. 24-hour interval CTc stable. MRI/MRA pending. Stable for transfer to medical floor.      FOR FOLLOW-UP:    [ ] monitor bowel movements - no BM in 2 days, BR ordered  [ ] F/u neuro recs  [ ] PT/OT  [ ] Hold aspirin and plavix in setting of ICH  [ ] F/u code status with HCP.    Toro Silva, PGY3  MAR [id number] (NS) / [id number] ([facility])

## 2021-09-13 NOTE — PROGRESS NOTE ADULT - SUBJECTIVE AND OBJECTIVE BOX
INTERVAL HPI/OVERNIGHT EVENTS:    SUBJECTIVE: Patient seen and examined at bedside.       VITAL SIGNS:  ICU Vital Signs Last 24 Hrs  T(C): 36.4 (13 Sep 2021 04:00), Max: 36.6 (12 Sep 2021 12:00)  T(F): 97.5 (13 Sep 2021 04:00), Max: 97.9 (12 Sep 2021 20:00)  HR: 68 (13 Sep 2021 04:00) (61 - 99)  BP: 122/55 (13 Sep 2021 04:00) (103/56 - 143/72)  BP(mean): 69 (13 Sep 2021 04:00) (67 - 102)  ABP: --  ABP(mean): --  RR: 17 (13 Sep 2021 04:00) (13 - 20)  SpO2: 100% (13 Sep 2021 04:00) (100% - 100%)      Plateau pressure:   P/F ratio:     09-12 @ 07:01  -  09-13 @ 07:00  --------------------------------------------------------  IN: 310 mL / OUT: 1320 mL / NET: -1010 mL      CAPILLARY BLOOD GLUCOSE      POCT Blood Glucose.: 107 mg/dL (11 Sep 2021 09:15)    ECG:    PHYSICAL EXAM:    General:   HEENT:   Neck:   Respiratory:   Cardiovascular:   Abdomen:   Extremities:  Neurological:    MEDICATIONS:  MEDICATIONS  (STANDING):  chlorhexidine 4% Liquid 1 Application(s) Topical <User Schedule>  finasteride 5 milliGRAM(s) Oral daily  influenza  Vaccine (HIGH DOSE) 0.7 milliLiter(s) IntraMuscular once  metoprolol tartrate 25 milliGRAM(s) Oral two times a day  tamsulosin 0.4 milliGRAM(s) Oral at bedtime    MEDICATIONS  (PRN):      ALLERGIES:  Allergies    No Known Allergies    Intolerances        LABS:                        12.9   7.71  )-----------( 204      ( 13 Sep 2021 03:44 )             36.9     09-13    135  |  101  |  21  ----------------------------<  106<H>  3.8   |  22  |  0.68    Ca    9.3      13 Sep 2021 03:44  Phos  2.6     09-13  Mg     2.30     09-13    TPro  7.3  /  Alb  4.1  /  TBili  0.8  /  DBili  x   /  AST  34  /  ALT  18  /  AlkPhos  59  09-13    PT/INR - ( 11 Sep 2021 18:47 )   PT: 12.0 sec;   INR: 1.05 ratio         PTT - ( 11 Sep 2021 18:47 )  PTT:29.4 sec      RADIOLOGY & ADDITIONAL TESTS: Reviewed.   INTERVAL HPI/OVERNIGHT EVENTS: No acute overnight events.    SUBJECTIVE: Patient seen and examined at bedside. No blurry vision and frontal headache.       VITAL SIGNS:  ICU Vital Signs Last 24 Hrs  T(C): 36.4 (13 Sep 2021 04:00), Max: 36.6 (12 Sep 2021 12:00)  T(F): 97.5 (13 Sep 2021 04:00), Max: 97.9 (12 Sep 2021 20:00)  HR: 68 (13 Sep 2021 04:00) (61 - 99)  BP: 122/55 (13 Sep 2021 04:00) (103/56 - 143/72)  BP(mean): 69 (13 Sep 2021 04:00) (67 - 102)  ABP: --  ABP(mean): --  RR: 17 (13 Sep 2021 04:00) (13 - 20)  SpO2: 100% (13 Sep 2021 04:00) (100% - 100%)      Plateau pressure:   P/F ratio:     09-12 @ 07:01  -  09-13 @ 07:00  --------------------------------------------------------  IN: 310 mL / OUT: 1320 mL / NET: -1010 mL      CAPILLARY BLOOD GLUCOSE      POCT Blood Glucose.: 107 mg/dL (11 Sep 2021 09:15)    ECG:    PHYSICAL EXAM:    General:   HEENT:   Neck:   Respiratory:   Cardiovascular:   Abdomen:   Extremities:  Neurological:    MEDICATIONS:  MEDICATIONS  (STANDING):  chlorhexidine 4% Liquid 1 Application(s) Topical <User Schedule>  finasteride 5 milliGRAM(s) Oral daily  influenza  Vaccine (HIGH DOSE) 0.7 milliLiter(s) IntraMuscular once  metoprolol tartrate 25 milliGRAM(s) Oral two times a day  tamsulosin 0.4 milliGRAM(s) Oral at bedtime    MEDICATIONS  (PRN):      ALLERGIES:  Allergies    No Known Allergies    Intolerances        LABS:                        12.9   7.71  )-----------( 204      ( 13 Sep 2021 03:44 )             36.9     09-13    135  |  101  |  21  ----------------------------<  106<H>  3.8   |  22  |  0.68    Ca    9.3      13 Sep 2021 03:44  Phos  2.6     09-13  Mg     2.30     09-13    TPro  7.3  /  Alb  4.1  /  TBili  0.8  /  DBili  x   /  AST  34  /  ALT  18  /  AlkPhos  59  09-13    PT/INR - ( 11 Sep 2021 18:47 )   PT: 12.0 sec;   INR: 1.05 ratio         PTT - ( 11 Sep 2021 18:47 )  PTT:29.4 sec      RADIOLOGY & ADDITIONAL TESTS: Reviewed.

## 2021-09-13 NOTE — OCCUPATIONAL THERAPY INITIAL EVALUATION ADULT - PERTINENT HX OF CURRENT PROBLEM, REHAB EVAL
85 y/o male with pmh of BPH presenting to [facility] as a transfer from [facility] due to intraparenchymal bleed in the cerebellum resulting in MICU admission for further management of blood pressure control and neurochecks

## 2021-09-13 NOTE — PHYSICAL THERAPY INITIAL EVALUATION ADULT - PRECAUTIONS/LIMITATIONS, REHAB EVAL
aspiration precautions/cardiac precautions/fall precautions/seizure precautions/swallowing precautions

## 2021-09-13 NOTE — PHYSICAL THERAPY INITIAL EVALUATION ADULT - DIAGNOSIS, PT EVAL
Pt admitted for Cerebellar Hemorrhage; CT of Head (+)Acute cerebellar hemorrhage; pt presents with Pt admitted for Cerebellar Hemorrhage; CT of Head (+)Acute cerebellar hemorrhage; pt presents with decreased strength and decreased balance.

## 2021-09-13 NOTE — PROGRESS NOTE ADULT - SUBJECTIVE AND OBJECTIVE BOX
PAST 24HR EVENTS: rpt CTH stable, no acute events overnight     PHYSICAL EXAM:  Vital Signs Last 24 Hrs  T(C): 36 (13 Sep 2021 12:00), Max: 36.6 (12 Sep 2021 20:00)  T(F): 96.8 (13 Sep 2021 12:00), Max: 97.9 (12 Sep 2021 20:00)  HR: 59 (13 Sep 2021 14:00) (59 - 99)  BP: 162/73 (13 Sep 2021 14:00) (103/56 - 162/73)  BP(mean): 93 (13 Sep 2021 14:00) (67 - 101)  RR: 14 (13 Sep 2021 14:00) (13 - 20)  SpO2: 100% (13 Sep 2021 14:00) (100% - 100%)    Awake Alert Attentive Affect appropriate, speech soft but clear  Cranial Nerves II-XII Intact  Pupils: PERRL  Motor- Moving all extremities   Dysmetria figner to nose noted BL    MEDS:   chlorhexidine 4% Liquid 1 Application(s) Topical <User Schedule>  finasteride 5 milliGRAM(s) Oral daily  influenza  Vaccine (HIGH DOSE) 0.7 milliLiter(s) IntraMuscular once  metoprolol tartrate 25 milliGRAM(s) Oral two times a day  tamsulosin 0.4 milliGRAM(s) Oral at bedtime      LABS:                        12.9   7.71  )-----------( 204      ( 13 Sep 2021 03:44 )             36.9     09-13    135  |  101  |  21  ----------------------------<  106<H>  3.8   |  22  |  0.68    Ca    9.3      13 Sep 2021 03:44  Phos  2.6     09-13  Mg     2.30     09-13    TPro  7.3  /  Alb  4.1  /  TBili  0.8  /  DBili  x   /  AST  34  /  ALT  18  /  AlkPhos  59  09-13    PT/INR - ( 11 Sep 2021 18:47 )   PT: 12.0 sec;   INR: 1.05 ratio         PTT - ( 11 Sep 2021 18:47 )  PTT:29.4 sec    RADIOLOGY:     < from: CT Head No Cont (09.12.21 @ 12:14) >  Redemonstration of an acute intraparenchymal hemorrhage within the midline and right cerebellum measuring 1.5 x 4.5 cm with mild surrounding vasogenic edema, grossly unchanged. No midline shift.    There are mild chronic white matter microvascular ischemic changes.    Prominence of the ventricles and sulci, compatible with age related volume loss.    The calvarium is intact.    The visualized portions of the paranasal sinuses and mastoid air cells are clear.    The bilateral orbits are unremarkable.    IMPRESSION:  Acute cerebellar hemorrhage, unchanged from 9/11/2021.

## 2021-09-13 NOTE — PHYSICAL THERAPY INITIAL EVALUATION ADULT - IMPAIRMENTS FOUND, PT EVAL
fine motor/gait, locomotion, and balance/muscle strength/neuromotor development and sensory integration

## 2021-09-13 NOTE — OCCUPATIONAL THERAPY INITIAL EVALUATION ADULT - GENERAL OBSERVATIONS, REHAB EVAL
Patient received semisupine in bed in NAD and agreeable to participate in OT evaluation. Patient presents with deficits in ADL performance and functional mobility as well as decreased endurance. Patient will benefit from inpatient rehab to improve functional independence. Patient left in semisupine position with HOB elevated above 30 degrees, in NAD. All lines intact.

## 2021-09-13 NOTE — PHYSICAL THERAPY INITIAL EVALUATION ADULT - FOLLOWS COMMANDS/ANSWERS QUESTIONS, REHAB EVAL
No pertinent past medical history 100% of the time/75% of the time/able to follow single-step instructions

## 2021-09-14 DIAGNOSIS — I10 ESSENTIAL (PRIMARY) HYPERTENSION: ICD-10-CM

## 2021-09-14 DIAGNOSIS — K59.00 CONSTIPATION, UNSPECIFIED: ICD-10-CM

## 2021-09-14 DIAGNOSIS — I61.9 NONTRAUMATIC INTRACEREBRAL HEMORRHAGE, UNSPECIFIED: ICD-10-CM

## 2021-09-14 DIAGNOSIS — N40.0 BENIGN PROSTATIC HYPERPLASIA WITHOUT LOWER URINARY TRACT SYMPTOMS: ICD-10-CM

## 2021-09-14 LAB
ANION GAP SERPL CALC-SCNC: 18 MMOL/L — HIGH (ref 7–14)
BASOPHILS # BLD AUTO: 0.02 K/UL — SIGNIFICANT CHANGE UP (ref 0–0.2)
BASOPHILS NFR BLD AUTO: 0.2 % — SIGNIFICANT CHANGE UP (ref 0–2)
BUN SERPL-MCNC: 33 MG/DL — HIGH (ref 7–23)
CALCIUM SERPL-MCNC: 9.7 MG/DL — SIGNIFICANT CHANGE UP (ref 8.4–10.5)
CHLORIDE SERPL-SCNC: 99 MMOL/L — SIGNIFICANT CHANGE UP (ref 98–107)
CO2 SERPL-SCNC: 21 MMOL/L — LOW (ref 22–31)
CREAT SERPL-MCNC: 0.66 MG/DL — SIGNIFICANT CHANGE UP (ref 0.5–1.3)
EOSINOPHIL # BLD AUTO: 0.01 K/UL — SIGNIFICANT CHANGE UP (ref 0–0.5)
EOSINOPHIL NFR BLD AUTO: 0.1 % — SIGNIFICANT CHANGE UP (ref 0–6)
GLUCOSE SERPL-MCNC: 101 MG/DL — HIGH (ref 70–99)
HCT VFR BLD CALC: 39.2 % — SIGNIFICANT CHANGE UP (ref 39–50)
HGB BLD-MCNC: 13.7 G/DL — SIGNIFICANT CHANGE UP (ref 13–17)
IANC: 8.14 K/UL — SIGNIFICANT CHANGE UP (ref 1.5–8.5)
IMM GRANULOCYTES NFR BLD AUTO: 0.5 % — SIGNIFICANT CHANGE UP (ref 0–1.5)
LYMPHOCYTES # BLD AUTO: 1.51 K/UL — SIGNIFICANT CHANGE UP (ref 1–3.3)
LYMPHOCYTES # BLD AUTO: 14.7 % — SIGNIFICANT CHANGE UP (ref 13–44)
MAGNESIUM SERPL-MCNC: 2.2 MG/DL — SIGNIFICANT CHANGE UP (ref 1.6–2.6)
MCHC RBC-ENTMCNC: 32.8 PG — SIGNIFICANT CHANGE UP (ref 27–34)
MCHC RBC-ENTMCNC: 34.9 GM/DL — SIGNIFICANT CHANGE UP (ref 32–36)
MCV RBC AUTO: 93.8 FL — SIGNIFICANT CHANGE UP (ref 80–100)
MONOCYTES # BLD AUTO: 0.56 K/UL — SIGNIFICANT CHANGE UP (ref 0–0.9)
MONOCYTES NFR BLD AUTO: 5.4 % — SIGNIFICANT CHANGE UP (ref 2–14)
NEUTROPHILS # BLD AUTO: 8.14 K/UL — HIGH (ref 1.8–7.4)
NEUTROPHILS NFR BLD AUTO: 79.1 % — HIGH (ref 43–77)
NRBC # BLD: 0 /100 WBCS — SIGNIFICANT CHANGE UP
NRBC # FLD: 0 K/UL — SIGNIFICANT CHANGE UP
PHOSPHATE SERPL-MCNC: 2.8 MG/DL — SIGNIFICANT CHANGE UP (ref 2.5–4.5)
PLATELET # BLD AUTO: 227 K/UL — SIGNIFICANT CHANGE UP (ref 150–400)
POTASSIUM SERPL-MCNC: 3.5 MMOL/L — SIGNIFICANT CHANGE UP (ref 3.5–5.3)
POTASSIUM SERPL-SCNC: 3.5 MMOL/L — SIGNIFICANT CHANGE UP (ref 3.5–5.3)
RBC # BLD: 4.18 M/UL — LOW (ref 4.2–5.8)
RBC # FLD: 12.3 % — SIGNIFICANT CHANGE UP (ref 10.3–14.5)
SARS-COV-2 RNA SPEC QL NAA+PROBE: SIGNIFICANT CHANGE UP
SODIUM SERPL-SCNC: 138 MMOL/L — SIGNIFICANT CHANGE UP (ref 135–145)
WBC # BLD: 10.29 K/UL — SIGNIFICANT CHANGE UP (ref 3.8–10.5)
WBC # FLD AUTO: 10.29 K/UL — SIGNIFICANT CHANGE UP (ref 3.8–10.5)

## 2021-09-14 PROCEDURE — 70551 MRI BRAIN STEM W/O DYE: CPT | Mod: 26

## 2021-09-14 PROCEDURE — 99233 SBSQ HOSP IP/OBS HIGH 50: CPT

## 2021-09-14 RX ORDER — METOPROLOL TARTRATE 50 MG
50 TABLET ORAL
Refills: 0 | Status: DISCONTINUED | OUTPATIENT
Start: 2021-09-14 | End: 2021-09-17

## 2021-09-14 RX ORDER — HYDRALAZINE HCL 50 MG
10 TABLET ORAL EVERY 8 HOURS
Refills: 0 | Status: DISCONTINUED | OUTPATIENT
Start: 2021-09-14 | End: 2021-09-16

## 2021-09-14 RX ORDER — HYDRALAZINE HCL 50 MG
10 TABLET ORAL EVERY 8 HOURS
Refills: 0 | Status: DISCONTINUED | OUTPATIENT
Start: 2021-09-14 | End: 2021-09-14

## 2021-09-14 RX ADMIN — FINASTERIDE 5 MILLIGRAM(S): 5 TABLET, FILM COATED ORAL at 11:14

## 2021-09-14 RX ADMIN — POLYETHYLENE GLYCOL 3350 17 GRAM(S): 17 POWDER, FOR SOLUTION ORAL at 11:13

## 2021-09-14 RX ADMIN — SENNA PLUS 2 TABLET(S): 8.6 TABLET ORAL at 22:13

## 2021-09-14 RX ADMIN — Medication 10 MILLIGRAM(S): at 10:16

## 2021-09-14 RX ADMIN — CHLORHEXIDINE GLUCONATE 1 APPLICATION(S): 213 SOLUTION TOPICAL at 06:23

## 2021-09-14 RX ADMIN — Medication 25 MILLIGRAM(S): at 05:01

## 2021-09-14 RX ADMIN — TAMSULOSIN HYDROCHLORIDE 0.4 MILLIGRAM(S): 0.4 CAPSULE ORAL at 22:13

## 2021-09-14 RX ADMIN — Medication 50 MILLIGRAM(S): at 17:10

## 2021-09-14 NOTE — CHART NOTE - NSCHARTNOTEFT_GEN_A_CORE
Notified by RN that BP was 160/80. Patient was admitted with intraparenchymal hemorrhage therefore neuro recommended to keep BP less than 130/80. IV hydralazine given stat and ordered as prn to maintain BP control. Increased metoprolol to 50 mg BID. Will monitor BP and adjust medications prn. Also spoke with neurology regarding imaging. Originally recommended MRI and MRA, however Neuro changed their recommendations and advised that MRA be discontinued.

## 2021-09-14 NOTE — CHART NOTE - NSCHARTNOTEFT_GEN_A_CORE
Notified by RN that patient was sustaining HR 140s on tele. EKG obtained at that time that shows Afib w/ RVR. Spoke with daughter Kimberly [phone] who states pt has a known history of Afib for which he follows up with a Cardiologist in [place] Dr. Ollie Devlin [phone]. Metoprolol was already increased this AM to maintain strict BP control for hemorrhage. 50mg Metoprolol PO given. Will monitor HR and adjust medications as needed.

## 2021-09-14 NOTE — PROVIDER CONTACT NOTE (OTHER) - ACTION/TREATMENT ORDERED:
Provider notified. EKG done at the bedside. Vital signs taken, see flowsheet, and metoprolol given as per order.

## 2021-09-14 NOTE — PROGRESS NOTE ADULT - PROBLEM SELECTOR PLAN 2
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

## 2021-09-14 NOTE — PROGRESS NOTE ADULT - SUBJECTIVE AND OBJECTIVE BOX
Patient is a 85y old  Male who presents with a chief complaint of Cerebellar bleed (13 Sep 2021 07:12)      SUBJECTIVE / OVERNIGHT EVENTS:    MEDICATIONS  (STANDING):  chlorhexidine 4% Liquid 1 Application(s) Topical <User Schedule>  finasteride 5 milliGRAM(s) Oral daily  influenza  Vaccine (HIGH DOSE) 0.7 milliLiter(s) IntraMuscular once  metoprolol tartrate 25 milliGRAM(s) Oral two times a day  polyethylene glycol 3350 17 Gram(s) Oral daily  senna 2 Tablet(s) Oral at bedtime  tamsulosin 0.4 milliGRAM(s) Oral at bedtime    MEDICATIONS  (PRN):  hydrALAZINE Injectable 10 milliGRAM(s) IV Push every 8 hours PRN SBP > 140      Vital Signs Last 24 Hrs  T(C): 36.5 (14 Sep 2021 09:00), Max: 36.6 (14 Sep 2021 05:00)  T(F): 97.7 (14 Sep 2021 09:00), Max: 97.9 (14 Sep 2021 05:00)  HR: 73 (14 Sep 2021 09:00) (58 - 82)  BP: 160/80 (14 Sep 2021 09:00) (107/57 - 163/90)  BP(mean): 72 (13 Sep 2021 18:00) (69 - 93)  RR: 18 (14 Sep 2021 09:00) (13 - 101)  SpO2: 98% (14 Sep 2021 09:00) (95% - 100%)  CAPILLARY BLOOD GLUCOSE        I&O's Summary    13 Sep 2021 07:01  -  14 Sep 2021 07:00  --------------------------------------------------------  IN: 120 mL / OUT: 435 mL / NET: -315 mL        PHYSICAL EXAM:  GENERAL: NAD, well-developed  HEAD:  Atraumatic, Normocephalic  EYES: EOMI, PERRLA, conjunctiva and sclera clear  NECK: Supple, No JVD  CHEST/LUNG: Clear to auscultation bilaterally; No wheeze  HEART: Regular rate and rhythm; No murmurs, rubs, or gallops  ABDOMEN: Soft, Nontender, Nondistended; Bowel sounds present  EXTREMITIES:  2+ Peripheral Pulses, No clubbing, cyanosis, or edema  PSYCH: AAOx3  NEUROLOGY: non-focal  SKIN: No rashes or lesions    LABS:                        12.9   7.71  )-----------( 204      ( 13 Sep 2021 03:44 )             36.9     09-13    135  |  101  |  21  ----------------------------<  106<H>  3.8   |  22  |  0.68    Ca    9.3      13 Sep 2021 03:44  Phos  2.6     09-13  Mg     2.30     09-13    TPro  7.3  /  Alb  4.1  /  TBili  0.8  /  DBili  x   /  AST  34  /  ALT  18  /  AlkPhos  59  09-13              RADIOLOGY & ADDITIONAL TESTS:    Imaging Personally Reviewed:    Consultant(s) Notes Reviewed:      Care Discussed with Consultants/Other Providers:   Patient is a 85y old  Male who presents with a chief complaint of Cerebellar bleed (13 Sep 2021 07:12)      SUBJECTIVE / OVERNIGHT EVENTS:  Patient has no new complaints. He c/o constipation since admission. Denies cp, SOB, abdominal pain, N/V/D     MEDICATIONS  (STANDING):  chlorhexidine 4% Liquid 1 Application(s) Topical <User Schedule>  finasteride 5 milliGRAM(s) Oral daily  influenza  Vaccine (HIGH DOSE) 0.7 milliLiter(s) IntraMuscular once  metoprolol tartrate 25 milliGRAM(s) Oral two times a day  polyethylene glycol 3350 17 Gram(s) Oral daily  senna 2 Tablet(s) Oral at bedtime  tamsulosin 0.4 milliGRAM(s) Oral at bedtime    MEDICATIONS  (PRN):  hydrALAZINE Injectable 10 milliGRAM(s) IV Push every 8 hours PRN SBP > 140      Vital Signs Last 24 Hrs  T(C): 36.5 (14 Sep 2021 09:00), Max: 36.6 (14 Sep 2021 05:00)  T(F): 97.7 (14 Sep 2021 09:00), Max: 97.9 (14 Sep 2021 05:00)  HR: 73 (14 Sep 2021 09:00) (58 - 82)  BP: 160/80 (14 Sep 2021 09:00) (107/57 - 163/90)  BP(mean): 72 (13 Sep 2021 18:00) (69 - 93)  RR: 18 (14 Sep 2021 09:00) (13 - 101)  SpO2: 98% (14 Sep 2021 09:00) (95% - 100%)  CAPILLARY BLOOD GLUCOSE        I&O's Summary    13 Sep 2021 07:01  -  14 Sep 2021 07:00  --------------------------------------------------------  IN: 120 mL / OUT: 435 mL / NET: -315 mL        PHYSICAL EXAM:  GENERAL: NAD, cachetic  HEAD:  Atraumatic, Normocephalic  EYES: EOMI, PERRLA, conjunctiva and sclera clear  NECK: Supple, No JVD  CHEST/LUNG: Clear to auscultation bilaterally; No wheeze  HEART: Regular rate and rhythm; No murmurs, rubs, or gallops  ABDOMEN: Soft, Nontender, Nondistended; Bowel sounds present  EXTREMITIES:  2+ Peripheral Pulses, No clubbing, cyanosis, or edema  PSYCH: AAOx3  NEUROLOGY: non-focal  SKIN: No rashes or lesions    LABS:                        12.9   7.71  )-----------( 204      ( 13 Sep 2021 03:44 )             36.9     09-13    135  |  101  |  21  ----------------------------<  106<H>  3.8   |  22  |  0.68    Ca    9.3      13 Sep 2021 03:44  Phos  2.6     09-13  Mg     2.30     09-13    TPro  7.3  /  Alb  4.1  /  TBili  0.8  /  DBili  x   /  AST  34  /  ALT  18  /  AlkPhos  59  09-13              RADIOLOGY & ADDITIONAL TESTS:    Imaging Personally Reviewed:    Consultant(s) Notes Reviewed:      Care Discussed with Consultants/Other Providers:

## 2021-09-14 NOTE — PROVIDER CONTACT NOTE (OTHER) - ASSESSMENT
Addended by: TOMI BANERJEE on: 3/23/2017 10:18 AM     Modules accepted: Orders     patient denies headache or chest pain. AxOx4 and alert. asymptomatic. no change in neuro baseline assessment.

## 2021-09-14 NOTE — PROGRESS NOTE ADULT - ASSESSMENT
83 y/o male w/ Hx HTN, BPH presenting to [facility] as a transfer from [facility] due to intraparenchymal bleed in the cerebellum resulting in MICU admission for further management of blood pressure control and neuro checks. 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

## 2021-09-14 NOTE — CHART NOTE - NSCHARTNOTEFT_GEN_A_CORE
Discussed with neurology regarding results of MRI head who advised no changes in management be made since results were as expected given prior imaging. Neuro advised to continue to trend sodium labs and monitor BP.

## 2021-09-15 DIAGNOSIS — I48.91 UNSPECIFIED ATRIAL FIBRILLATION: ICD-10-CM

## 2021-09-15 LAB
ALBUMIN SERPL ELPH-MCNC: 4 G/DL — SIGNIFICANT CHANGE UP (ref 3.3–5)
ALP SERPL-CCNC: 62 U/L — SIGNIFICANT CHANGE UP (ref 40–120)
ALT FLD-CCNC: 17 U/L — SIGNIFICANT CHANGE UP (ref 4–41)
ANION GAP SERPL CALC-SCNC: 10 MMOL/L — SIGNIFICANT CHANGE UP (ref 7–14)
AST SERPL-CCNC: 26 U/L — SIGNIFICANT CHANGE UP (ref 4–40)
BASOPHILS # BLD AUTO: 0.03 K/UL — SIGNIFICANT CHANGE UP (ref 0–0.2)
BASOPHILS NFR BLD AUTO: 0.3 % — SIGNIFICANT CHANGE UP (ref 0–2)
BILIRUB SERPL-MCNC: 1.1 MG/DL — SIGNIFICANT CHANGE UP (ref 0.2–1.2)
BUN SERPL-MCNC: 31 MG/DL — HIGH (ref 7–23)
CALCIUM SERPL-MCNC: 9.6 MG/DL — SIGNIFICANT CHANGE UP (ref 8.4–10.5)
CHLORIDE SERPL-SCNC: 104 MMOL/L — SIGNIFICANT CHANGE UP (ref 98–107)
CO2 SERPL-SCNC: 22 MMOL/L — SIGNIFICANT CHANGE UP (ref 22–31)
CREAT SERPL-MCNC: 0.7 MG/DL — SIGNIFICANT CHANGE UP (ref 0.5–1.3)
EOSINOPHIL # BLD AUTO: 0.04 K/UL — SIGNIFICANT CHANGE UP (ref 0–0.5)
EOSINOPHIL NFR BLD AUTO: 0.4 % — SIGNIFICANT CHANGE UP (ref 0–6)
GLUCOSE SERPL-MCNC: 87 MG/DL — SIGNIFICANT CHANGE UP (ref 70–99)
HCT VFR BLD CALC: 39.1 % — SIGNIFICANT CHANGE UP (ref 39–50)
HGB BLD-MCNC: 13.6 G/DL — SIGNIFICANT CHANGE UP (ref 13–17)
IANC: 7.68 K/UL — SIGNIFICANT CHANGE UP (ref 1.5–8.5)
IMM GRANULOCYTES NFR BLD AUTO: 0.4 % — SIGNIFICANT CHANGE UP (ref 0–1.5)
LYMPHOCYTES # BLD AUTO: 0.97 K/UL — LOW (ref 1–3.3)
LYMPHOCYTES # BLD AUTO: 10.2 % — LOW (ref 13–44)
MAGNESIUM SERPL-MCNC: 2.2 MG/DL — SIGNIFICANT CHANGE UP (ref 1.6–2.6)
MCHC RBC-ENTMCNC: 33.1 PG — SIGNIFICANT CHANGE UP (ref 27–34)
MCHC RBC-ENTMCNC: 34.8 GM/DL — SIGNIFICANT CHANGE UP (ref 32–36)
MCV RBC AUTO: 95.1 FL — SIGNIFICANT CHANGE UP (ref 80–100)
MONOCYTES # BLD AUTO: 0.72 K/UL — SIGNIFICANT CHANGE UP (ref 0–0.9)
MONOCYTES NFR BLD AUTO: 7.6 % — SIGNIFICANT CHANGE UP (ref 2–14)
NEUTROPHILS # BLD AUTO: 7.68 K/UL — HIGH (ref 1.8–7.4)
NEUTROPHILS NFR BLD AUTO: 81.1 % — HIGH (ref 43–77)
NRBC # BLD: 0 /100 WBCS — SIGNIFICANT CHANGE UP
NRBC # FLD: 0 K/UL — SIGNIFICANT CHANGE UP
PHOSPHATE SERPL-MCNC: 3 MG/DL — SIGNIFICANT CHANGE UP (ref 2.5–4.5)
PLATELET # BLD AUTO: 213 K/UL — SIGNIFICANT CHANGE UP (ref 150–400)
POTASSIUM SERPL-MCNC: 3.7 MMOL/L — SIGNIFICANT CHANGE UP (ref 3.5–5.3)
POTASSIUM SERPL-SCNC: 3.7 MMOL/L — SIGNIFICANT CHANGE UP (ref 3.5–5.3)
PROT SERPL-MCNC: 7.3 G/DL — SIGNIFICANT CHANGE UP (ref 6–8.3)
RBC # BLD: 4.11 M/UL — LOW (ref 4.2–5.8)
RBC # FLD: 12.3 % — SIGNIFICANT CHANGE UP (ref 10.3–14.5)
SODIUM SERPL-SCNC: 136 MMOL/L — SIGNIFICANT CHANGE UP (ref 135–145)
WBC # BLD: 9.48 K/UL — SIGNIFICANT CHANGE UP (ref 3.8–10.5)
WBC # FLD AUTO: 9.48 K/UL — SIGNIFICANT CHANGE UP (ref 3.8–10.5)

## 2021-09-15 PROCEDURE — 99233 SBSQ HOSP IP/OBS HIGH 50: CPT

## 2021-09-15 RX ADMIN — Medication 50 MILLIGRAM(S): at 05:42

## 2021-09-15 RX ADMIN — SENNA PLUS 2 TABLET(S): 8.6 TABLET ORAL at 21:47

## 2021-09-15 RX ADMIN — Medication 50 MILLIGRAM(S): at 18:00

## 2021-09-15 RX ADMIN — TAMSULOSIN HYDROCHLORIDE 0.4 MILLIGRAM(S): 0.4 CAPSULE ORAL at 21:48

## 2021-09-15 RX ADMIN — CHLORHEXIDINE GLUCONATE 1 APPLICATION(S): 213 SOLUTION TOPICAL at 06:23

## 2021-09-15 RX ADMIN — FINASTERIDE 5 MILLIGRAM(S): 5 TABLET, FILM COATED ORAL at 12:04

## 2021-09-15 NOTE — PROGRESS NOTE ADULT - SUBJECTIVE AND OBJECTIVE BOX
Patient is a 85y old  Male who presents with a chief complaint of Cerebellar bleed (14 Sep 2021 10:18)      SUBJECTIVE / OVERNIGHT EVENTS:    MEDICATIONS  (STANDING):  bisacodyl Suppository 10 milliGRAM(s) Rectal once  chlorhexidine 4% Liquid 1 Application(s) Topical <User Schedule>  finasteride 5 milliGRAM(s) Oral daily  influenza  Vaccine (HIGH DOSE) 0.7 milliLiter(s) IntraMuscular once  metoprolol tartrate 50 milliGRAM(s) Oral two times a day  polyethylene glycol 3350 17 Gram(s) Oral daily  senna 2 Tablet(s) Oral at bedtime  tamsulosin 0.4 milliGRAM(s) Oral at bedtime    MEDICATIONS  (PRN):  hydrALAZINE Injectable 10 milliGRAM(s) IV Push every 8 hours PRN SBP > 160      Vital Signs Last 24 Hrs  T(C): 36.7 (15 Sep 2021 05:42), Max: 36.7 (15 Sep 2021 05:42)  T(F): 98.1 (15 Sep 2021 05:42), Max: 98.1 (15 Sep 2021 05:42)  HR: 73 (15 Sep 2021 05:42) (70 - 122)  BP: 131/72 (15 Sep 2021 05:42) (124/78 - 153/81)  BP(mean): --  RR: 18 (15 Sep 2021 05:42) (18 - 18)  SpO2: 98% (15 Sep 2021 05:42) (98% - 99%)  CAPILLARY BLOOD GLUCOSE        I&O's Summary    14 Sep 2021 07:01  -  15 Sep 2021 07:00  --------------------------------------------------------  IN: 0 mL / OUT: 200 mL / NET: -200 mL        PHYSICAL EXAM:  GENERAL: NAD, cachetic  HEAD:  Atraumatic, Normocephalic  EYES: EOMI, PERRLA, conjunctiva and sclera clear  NECK: Supple, No JVD  CHEST/LUNG: Clear to auscultation bilaterally; No wheeze  HEART: Regular rate and rhythm; No murmurs, rubs, or gallops  ABDOMEN: Soft, Nontender, Nondistended; Bowel sounds present  EXTREMITIES:  2+ Peripheral Pulses, No clubbing, cyanosis, or edema  PSYCH: AAOx3  NEUROLOGY: non-focal  SKIN: No rashes or lesions    LABS:                        13.6   9.48  )-----------( 213      ( 15 Sep 2021 06:53 )             39.1     09-15    136  |  104  |  31<H>  ----------------------------<  87  3.7   |  22  |  0.70    Ca    9.6      15 Sep 2021 06:53  Phos  3.0     09-15  Mg     2.20     09-15    TPro  7.3  /  Alb  4.0  /  TBili  1.1  /  DBili  x   /  AST  26  /  ALT  17  /  AlkPhos  62  09-15              RADIOLOGY & ADDITIONAL TESTS:    Imaging Personally Reviewed: < from: MR Head No Cont (09.14.21 @ 15:17) >  IMPRESSION: Parenchymal hemorrhage as described above. Contrast-enhanced MRI of the brain can be done to better evaluate this finding.      < end of copied text >      Consultant(s) Notes Reviewed:      Care Discussed with Consultants/Other Providers:   Patient is a 85y old  Male who presents with a chief complaint of Cerebellar bleed (14 Sep 2021 10:18)      SUBJECTIVE / OVERNIGHT EVENTS:  Patient has no new complaints. He says he feels better and moved his bowels. Denies cp, SOB, abdominal pain, N/V/D     MEDICATIONS  (STANDING):  bisacodyl Suppository 10 milliGRAM(s) Rectal once  chlorhexidine 4% Liquid 1 Application(s) Topical <User Schedule>  finasteride 5 milliGRAM(s) Oral daily  influenza  Vaccine (HIGH DOSE) 0.7 milliLiter(s) IntraMuscular once  metoprolol tartrate 50 milliGRAM(s) Oral two times a day  polyethylene glycol 3350 17 Gram(s) Oral daily  senna 2 Tablet(s) Oral at bedtime  tamsulosin 0.4 milliGRAM(s) Oral at bedtime    MEDICATIONS  (PRN):  hydrALAZINE Injectable 10 milliGRAM(s) IV Push every 8 hours PRN SBP > 160      Vital Signs Last 24 Hrs  T(C): 36.7 (15 Sep 2021 05:42), Max: 36.7 (15 Sep 2021 05:42)  T(F): 98.1 (15 Sep 2021 05:42), Max: 98.1 (15 Sep 2021 05:42)  HR: 73 (15 Sep 2021 05:42) (70 - 122)  BP: 131/72 (15 Sep 2021 05:42) (124/78 - 153/81)  BP(mean): --  RR: 18 (15 Sep 2021 05:42) (18 - 18)  SpO2: 98% (15 Sep 2021 05:42) (98% - 99%)  CAPILLARY BLOOD GLUCOSE        I&O's Summary    14 Sep 2021 07:01  -  15 Sep 2021 07:00  --------------------------------------------------------  IN: 0 mL / OUT: 200 mL / NET: -200 mL        PHYSICAL EXAM:  GENERAL: NAD, cachetic  HEAD:  Atraumatic, Normocephalic  EYES: EOMI, PERRLA, conjunctiva and sclera clear  NECK: Supple, No JVD  CHEST/LUNG: Clear to auscultation bilaterally; No wheeze  HEART: Regular rate and rhythm; No murmurs, rubs, or gallops  ABDOMEN: Soft, Nontender, Nondistended; Bowel sounds present  EXTREMITIES:  2+ Peripheral Pulses, No clubbing, cyanosis, or edema  PSYCH: AAOx3  NEUROLOGY: non-focal  SKIN: No rashes or lesions    LABS:                        13.6   9.48  )-----------( 213      ( 15 Sep 2021 06:53 )             39.1     09-15    136  |  104  |  31<H>  ----------------------------<  87  3.7   |  22  |  0.70    Ca    9.6      15 Sep 2021 06:53  Phos  3.0     09-15  Mg     2.20     09-15    TPro  7.3  /  Alb  4.0  /  TBili  1.1  /  DBili  x   /  AST  26  /  ALT  17  /  AlkPhos  62  09-15              RADIOLOGY & ADDITIONAL TESTS:    Imaging Personally Reviewed: < from: MR Head No Cont (09.14.21 @ 15:17) >  IMPRESSION: Parenchymal hemorrhage as described above. Contrast-enhanced MRI of the brain can be done to better evaluate this finding.      < end of copied text >      Consultant(s) Notes Reviewed:      Care Discussed with Consultants/Other Providers:

## 2021-09-15 NOTE — PROGRESS NOTE ADULT - NSPROGADDITIONALINFOA_GEN_ALL_CORE
I agree with the plan as stated above.  This patient is cleared from a neurologic perspective.  Follow up in stroke clinic.  Hold anticoagulation for 4 weeks due to size of hemorrhage.  Recommend repeat CTH prior to restarting anticoagulation for atrial fibrillation.  Long term blood pressure goal 120/80.    Tenisha Newsome MD  PGY5  Vascular Neurology Fellow

## 2021-09-15 NOTE — PROGRESS NOTE ADULT - PROBLEM SELECTOR PLAN 1
-CTA Head and Neck with intraparenchymal hemorrhage of cerebellum performed on 9/11. Showed no significant stenosis in the R and L carotid. Acute intraparenchymal hemorrhage in the cerebellum at the midline and right of midline measuring approximately 1.2 x 4.4 cm with surrounding mild vasogenic edema.  -Repeat Head CT on 9/12 showed no acute change from 9/11.   -NeuroSx assessed no surgical intervention.   -Maintain SBP<130/80  -MRI again showed hemorrhage CVA  -hold aspirin and plavix for now.  -PT rec rehab -CTA Head and Neck with intraparenchymal hemorrhage of cerebellum performed on 9/11. Showed no significant stenosis in the R and L carotid. Acute intraparenchymal hemorrhage in the cerebellum at the midline and right of midline measuring approximately 1.2 x 4.4 cm with surrounding mild vasogenic edema.  -Repeat Head CT on 9/12 showed no acute change from 9/11.   -NeuroSx assessed no surgical intervention.   -Maintain SBP<130/80  -MRI again showed hemorrhage CVA  -repeat Ct head in 4 weeks ; If CT head negative in 4 weeks Start Eliquis for afib as per neurology  -PT rec rehab

## 2021-09-15 NOTE — SWALLOW BEDSIDE ASSESSMENT ADULT - SWALLOW EVAL: DIAGNOSIS
1) Mild oral dysphagia for solids marked by adequate acceptance, mildly prolonged mastication, and and slow manipulation. However, pt with adequate bolus coordination and collection in time for anterior to posterior timely oral transit and transfer. Adequate oral clearance noted. 2) Functional oral stage of swallow for puree, honey thick fluids, nectar thick fluids, and thin fluids marked by adequate acceptance, bolus manipulation, mastication, and coordination. Adequate bolus collection and timely anterior to posterior oral transit/transfer noted. Oral clearance observed. 3) Functional pharyngeal stage of swallow for solids, puree, honey thick fluids, nectar thick fluids, and thin fluids marked by initiation of pharyngeal swallow trigger and hyolaryngeal excursion noted upon digital palpation. No overt signs/symptoms of penetration/aspiration noted across all consistencies.

## 2021-09-15 NOTE — PROGRESS NOTE ADULT - ASSESSMENT
Assessment: 85 year old (preferentially Cantonese-speaking) male w/ PMHx HTN, disc herniation, who presented to [facility] for bilateral frontal headache, and gait instability onset ~20:45PM 9/11/21; found to have acute intraparenchymal hemorrhage in the cerebellum at the midline and right of midline (1.2 x 4.4cm) w/ surrounding mild vasogenic edema; transferred to [facility] MICU for further management. MRI brain w/o contrast demonstrated acute R. cerebellar hemispheric & cerebellar midline hemorrhage, chronic L. cerebellar hemispheric hemorrhage, foci of susceptibility observed throughout the posterior fossa and also in the anterior circulation cortex is compatible with chronic, small hemorrhages.    Impression: Improving bifrontal headache w/ gait instability, intermittent dizziness, and improving dysmetria on FTN (R dysmetria resolved, L persists, possibly chronic due to old hemorrhage) 2/2 acute intracranial parenchymal hemorrhage of the R. & midline cerebellum 2/2 chronic hypertension with the consideration for superimposed cerebral amyloid angiopathy given the lobar distrubution of acute hemorrhages and chronic small hemorrhages.       Recommendations    Imaging  [] rCTH in 4 weeks from presentation to evaluate for stability.    Meds  [] Hold all antiplatelet or potential anticoagulation therapy for 4 weeks from presentation. If rCTH shows a stable hemorrhage, start on Anticoagulation agent for Afib  [] C/w Metoprolol 50 mg BID, as per primary team for blood pressure control    Other  [] SBP target <160 mmHg   [] Telemonitoring;  Neurochecks and Vital signs per unit protocol  [] C/w PT/OT   [] Patient should follow up with Stroke NP, Radha Herrera, in clinic at [address] [place] [address]. Please email [email or web address] w/ basic PHI.     Patient case discussed and seen with stroke fellow, Dr. Newsome, and stroke attending, Dr. St.  Assessment: 85 year old (preferentially Cantonese-speaking) male w/ PMHx HTN, disc herniation, who presented to [place] for bilateral frontal headache, and gait instability onset ~20:45PM 9/11/21; found to have acute intraparenchymal hemorrhage in the cerebellum at the midline and right of midline (1.2 x 4.4cm) w/ surrounding mild vasogenic edema; transferred to [facility] MICU for further management. MRI brain w/o contrast demonstrated acute R. cerebellar hemispheric & cerebellar midline hemorrhage, chronic L. cerebellar hemispheric hemorrhage, foci of susceptibility observed throughout the posterior fossa and also in the anterior circulation cortex is compatible with chronic, small hemorrhages.    Impression: Improving bifrontal headache w/ gait instability, intermittent dizziness, and improving dysmetria on FTN (R dysmetria resolved, L persists, possibly chronic due to old hemorrhage) 2/2 acute intracranial parenchymal hemorrhage of the R. & midline cerebellum 2/2 chronic hypertension with the consideration for superimposed cerebral amyloid angiopathy given the lobar distrubution of acute hemorrhages and chronic small hemorrhages.       Recommendations    Imaging  [] rCTH in 4 weeks from presentation to evaluate for stability.    Meds  [] Hold all antiplatelet or potential anticoagulation therapy for 4 weeks from presentation. If rCTH shows a stable hemorrhage, start on Anticoagulation agent for Afib  [] C/w Metoprolol 50 mg BID, as per primary team for blood pressure control    Other  [] SBP target <160 mmHg   [] Telemonitoring;  Neurochecks and Vital signs per unit protocol  [] C/w PT/OT   [] Patient should follow up with Stroke NP, Radha Herrera, in clinic at [address] [place] [address]. Please email [email or web address] w/ basic PHI.     From a neurovascular standpoint, patient is cleared for discharge.    Patient case discussed and seen with stroke fellow, Dr. Newsome, and stroke attending, Dr. St.

## 2021-09-15 NOTE — PROGRESS NOTE ADULT - PROBLEM SELECTOR PLAN 3
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

## 2021-09-15 NOTE — PROVIDER CONTACT NOTE (OTHER) - ASSESSMENT
Patient is A&Ox4, Cantonese speaking, states that there's no pain when urinating. Patient had bundy removed due to TOV last night. Patient voided with post void residual of 177cc. Patient is A&Ox4, Cantonese speaking, states that there's no pain when urinating. Patient's hemoglobin stable at 13.6 and hematocrit at 39.1 . Patient had bundy removed due to TOV last night. Patient voided with post void residual of 177cc.

## 2021-09-15 NOTE — PROGRESS NOTE ADULT - PROBLEM SELECTOR PLAN 2
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

## 2021-09-15 NOTE — CHART NOTE - NSCHARTNOTEFT_GEN_A_CORE
Notified by RN regarding blood discovered on patient's penis. Patient evaluated and examined at bedside. No active bleeding was seen. Patient previously catheterized in MICU and completed a TOV overnight. Patient's catheter was then removed and therefore likely the cause of the bleeding.

## 2021-09-15 NOTE — PROGRESS NOTE ADULT - PROBLEM SELECTOR PLAN 4
give Dulcolax suppository  c/w miralax/senna c/w Flomax and finasteride  d/c'ed  niharika   passed TOV

## 2021-09-15 NOTE — SWALLOW BEDSIDE ASSESSMENT ADULT - COMMENTS
As per charting, pt is a "85 y/o male (preferentially, Cantonese-speaking) with pmh of irregular heart rate, BPH, and disc herniation who presented to [facility] for nausea, headache, and the inability to walk on his own without feeling dizzy. Last night, the pt around 8:45 pm was about to have dinner when he noticed he felt nauseous and had a frontal headache and it was hard for him to get up. His family had to help him to get up and go to his bed with his cane. While laying in bed, his nausea was gone, however his headache was persistent. The pt and his family decided that in the morning they would go to his PCP for further care. However given his persistent nausea and bilateral frontal headache in the morning, they called 911 and went to the ED."    CT Head 9/12/21 indicates "Acute cerebellar hemorrhage, unchanged from 9/11/2021."     Pt received seated upright, at bedside, during clinical swallow evaluation this AM. Pt was awake/alert and oriented. Cantonese  services utilized ( ID # [id number]). Pt was cooperative during the assessment, and followed directions appropriately. Pt answered questions and engaged in conversation with he SLP. Pt denied feeding/swallowing difficulties. Pt denied pain.

## 2021-09-15 NOTE — PROGRESS NOTE ADULT - ASSESSMENT
85 y/o male w/ Hx HTN, BPH presenting to [facility] as a transfer from [facility] due to intraparenchymal bleed in the cerebellum resulting in MICU admission for further management of blood pressure control and neuro checks. 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

## 2021-09-15 NOTE — PROGRESS NOTE ADULT - SUBJECTIVE AND OBJECTIVE BOX
SUBJECTIVE: "85 y/o male (preferentially, Cantonese-speaking) with pmh of irregular heart rate, BPH, and disc herniation who presented to [facility] for nausea, headache, and the inability to walk on his own without feeling dizzy. Last night, the pt around 8:45 pm was about to have dinner when he noticed he felt nauseous and had a frontal headache and it was hard for him to get up. His family had to help him to get up and go to his bed with his cane. While laying in bed, his nausea was gone, however his headache was persistent. The pt and his family decided that in the morning they would go to his PCP for further care. However given his persistent nausea and bilateral frontal headache in the morning, they called 911 and went to the ED."  (11 Sep 2021 19:01)  Neurology initially consulted for 85 year old male w/ PMHx BPH, HTN, disc herniation as a transfer from [place] for nausea, headache, and inability to walk not a/w dizziness, found to have cerebellar bleed. Per family, patient c/o bilateral frontal headache at 8:45PM yesterday at dinner. He went to rest later than night as headache persisted. Nausea and headache onset associated w/ gait instability. Of note, patient denied nausea (denied that he felt as if he was going to vomit), and denied room-spinning/dizziness. He denies recent trauma, fevers, chills, cough, chest pain, SOB, abdominal pain, n/v/d/c, hematochezia, dysuria. Patient denies history of stroke in the past. He uses a cane at home.    INTERVAL HISTORY: Patient seen at bedside with stroke team, fellow, and attending.  services used for encounter (ID# [id number],  Kanwal). Patient endorses no new or worsening complaints.    PAST MEDICAL & SURGICAL HISTORY:  BPH (benign prostatic hyperplasia)    Irregular heart rate    Lumbar disc herniation    No significant past surgical history      FAMILY HISTORY:  No pertinent family history in first degree relatives      SOCIAL HISTORY:   T/E/D:   Occupation:   Lives with:     MEDICATIONS (HOME):  Home Medications:  clopidogrel 75 mg oral tablet: 1 tab(s) orally once a day (11 Sep 2021 19:42)  famotidine 40 mg oral tablet: 1 tab(s) orally once a day (at bedtime) (11 Sep 2021 19:42)  finasteride 5 mg oral tablet: 1 tab(s) orally once a day (11 Sep 2021 19:42)  metoprolol succinate 25 mg oral tablet, extended release: 1 tab(s) orally once a day (11 Sep 2021 19:42)  pantoprazole 40 mg oral delayed release tablet: 1 tab(s) orally once a day (11 Sep 2021 19:42)  quiNIDine 324 mg oral tablet, extended release: 1 tab(s) orally once a day (11 Sep 2021 19:42)  tamsulosin 0.4 mg oral capsule: 1 cap(s) orally once a day (11 Sep 2021 19:42)    MEDICATIONS  (STANDING):  bisacodyl Suppository 10 milliGRAM(s) Rectal once  chlorhexidine 4% Liquid 1 Application(s) Topical <User Schedule>  finasteride 5 milliGRAM(s) Oral daily  influenza  Vaccine (HIGH DOSE) 0.7 milliLiter(s) IntraMuscular once  metoprolol tartrate 50 milliGRAM(s) Oral two times a day  polyethylene glycol 3350 17 Gram(s) Oral daily  senna 2 Tablet(s) Oral at bedtime  tamsulosin 0.4 milliGRAM(s) Oral at bedtime    MEDICATIONS  (PRN):  hydrALAZINE Injectable 10 milliGRAM(s) IV Push every 8 hours PRN SBP > 160    ALLERGIES/INTOLERANCES:  Allergies  No Known Allergies    Intolerances    VITALS & EXAMINATION:  Vital Signs Last 24 Hrs  T(C): 36.7 (15 Sep 2021 08:15), Max: 36.7 (15 Sep 2021 05:42)  T(F): 98 (15 Sep 2021 08:15), Max: 98.1 (15 Sep 2021 05:42)  HR: 70 (15 Sep 2021 08:15) (70 - 122)  BP: 151/77 (15 Sep 2021 08:15) (124/78 - 151/77)  BP(mean): --  RR: 17 (15 Sep 2021 08:15) (17 - 18)  SpO2: 97% (15 Sep 2021 08:15) (97% - 99%)    General:  Constitutional: Obese Male, appears stated age, in no apparent distress including pain  Head: Normocephalic & atraumatic.  ENT: Patent ear canals, intact TM, mucus membranes moist & pink, neck supple, no lymphadenopathy.   Respiratory: Patent airway. All lung fields are clear to auscultation bilaterally.  Extremities: No cyanosis, clubbing, or edema.  Skin: No rashes, bruising, or discoloration.    Cardiovascular (>2): RRR no murmurs. Carotid pulsations symmetric, no bruits. Normal capillary beds refill, 1-2 seconds or less.     Neurological (>12):  MS: Awake, alert, oriented to person, place, situation, time. Normal affect. Follows all commands.    Language: Speech is clear, fluent with good repetition & comprehension (able to name objects___)    CNs: PERRLA (R = 3mm, L = 3mm). VFF. EOMI no nystagmus, no diplopia. V1-3 intact to LT/pinprick, well developed masseter muscles b/l. No facial asymmetry b/l, full eye closure strength b/l. Hearing grossly normal (rubbing fingers) b/l. Symmetric palate elevation in midline. Gag reflex deferred. Head turning & shoulder shrug intact b/l. Tongue midline, normal movements, no atrophy.    Fundoscopic: pale w/ sharp discs margins No vascular changes.      Motor: Normal muscle bulk & tone. No noticeable tremor or seizure. No pronator drift.              Deltoid	Biceps	Triceps	Wrist	Finger ABd	   R	5	5	5	5	5		5 	  L	5	5	5	5	5		5    	H-Flex	H-Ext	H-ABd	H-ADd	K-Flex	K-Ext	D-Flex	P-Flex  R	5	5	5	5	5	5	5	5 	   L	5	5	5	5	5	5	5	5	     Sensation: Intact to LT/PP/Temp/Vibration/Position b/l throughout.     Cortical: Extinction on DSS (neglect): none    Reflexes:              Biceps(C5)       BR(C6)     Triceps(C7)               Patellar(L4)    Achilles(S1)    Plantar Resp  R	2	          2	             2		        2		    2		Down   L	2	          2	             2		        2		    2		Down     Coordination: intact rapid-alt movements. No dysmetria to FTN/HTS    Gait: Normal Romberg. No postural instability. Normal stance and tandem gait.     LABORATORY:  CBC                       13.6   9.48  )-----------( 213      ( 15 Sep 2021 06:53 )             39.1     Chem 09-15    136  |  104  |  31<H>  ----------------------------<  87  3.7   |  22  |  0.70    Ca    9.6      15 Sep 2021 06:53  Phos  3.0     09-15  Mg     2.20     09-15    TPro  7.3  /  Alb  4.0  /  TBili  1.1  /  DBili  x   /  AST  26  /  ALT  17  /  AlkPhos  62  09-15    LFTs LIVER FUNCTIONS - ( 15 Sep 2021 06:53 )  Alb: 4.0 g/dL / Pro: 7.3 g/dL / ALK PHOS: 62 U/L / ALT: 17 U/L / AST: 26 U/L / GGT: x           Coagulopathy   Lipid Panel   A1c   Cardiac enzymes     U/A   CSF  Immunological  Other    STUDIES & IMAGING:  Studies (EKG, EEG, EMG, etc):     Radiology (XR, CT, MR, U/S, TTE/RUDY): SUBJECTIVE: "85 y/o male (preferentially, Cantonese-speaking) with pmh of irregular heart rate, BPH, and disc herniation who presented to [facility] for nausea, headache, and the inability to walk on his own without feeling dizzy. Last night, the pt around 8:45 pm was about to have dinner when he noticed he felt nauseous and had a frontal headache and it was hard for him to get up. His family had to help him to get up and go to his bed with his cane. While laying in bed, his nausea was gone, however his headache was persistent. The pt and his family decided that in the morning they would go to his PCP for further care. However given his persistent nausea and bilateral frontal headache in the morning, they called 911 and went to the ED."  (11 Sep 2021 19:01)  Neurology initially consulted for 85 year old male w/ PMHx BPH, HTN, disc herniation as a transfer from [place] for nausea, headache, and inability to walk not a/w dizziness, found to have cerebellar bleed. Per family, patient c/o bilateral frontal headache at 8:45PM yesterday at dinner. He went to rest later than night as headache persisted. Nausea and headache onset associated w/ gait instability. Of note, patient denied nausea (denied that he felt as if he was going to vomit), and denied room-spinning/dizziness. He denies recent trauma, fevers, chills, cough, chest pain, SOB, abdominal pain, n/v/d/c, hematochezia, dysuria. Patient denies history of stroke in the past. He uses a cane at home.    INTERVAL HISTORY: Patient seen at bedside with stroke team, fellow, and attending.  services used for encounter (ID# [id number],  Kanwal). Patient endorses non-specific, intermittent dizziness while sitting in bed. Otherwise no new complaints and feels some of his symptoms have improved. As per primary team, patient in Afib w/ RVR on tele monitor. As per collateral, patient has known Afib which he takes ?Plavix for. Primary team to clarify with patient's cardiologist the indication of Plavix over anticoagulation.     PAST MEDICAL & SURGICAL HISTORY:  BPH (benign prostatic hyperplasia)    Irregular heart rate    Lumbar disc herniation    No significant past surgical history      FAMILY HISTORY:  No pertinent family history in first degree relatives      SOCIAL HISTORY:   T/E/D:   Occupation:   Lives with:     MEDICATIONS (HOME):  Home Medications:  clopidogrel 75 mg oral tablet: 1 tab(s) orally once a day (11 Sep 2021 19:42)  famotidine 40 mg oral tablet: 1 tab(s) orally once a day (at bedtime) (11 Sep 2021 19:42)  finasteride 5 mg oral tablet: 1 tab(s) orally once a day (11 Sep 2021 19:42)  metoprolol succinate 25 mg oral tablet, extended release: 1 tab(s) orally once a day (11 Sep 2021 19:42)  pantoprazole 40 mg oral delayed release tablet: 1 tab(s) orally once a day (11 Sep 2021 19:42)  quiNIDine 324 mg oral tablet, extended release: 1 tab(s) orally once a day (11 Sep 2021 19:42)  tamsulosin 0.4 mg oral capsule: 1 cap(s) orally once a day (11 Sep 2021 19:42)    MEDICATIONS  (STANDING):  bisacodyl Suppository 10 milliGRAM(s) Rectal once  chlorhexidine 4% Liquid 1 Application(s) Topical <User Schedule>  finasteride 5 milliGRAM(s) Oral daily  influenza  Vaccine (HIGH DOSE) 0.7 milliLiter(s) IntraMuscular once  metoprolol tartrate 50 milliGRAM(s) Oral two times a day  polyethylene glycol 3350 17 Gram(s) Oral daily  senna 2 Tablet(s) Oral at bedtime  tamsulosin 0.4 milliGRAM(s) Oral at bedtime    MEDICATIONS  (PRN):  hydrALAZINE Injectable 10 milliGRAM(s) IV Push every 8 hours PRN SBP > 160    ALLERGIES/INTOLERANCES:  Allergies  No Known Allergies    Intolerances    VITALS & EXAMINATION:  Vital Signs Last 24 Hrs  T(C): 36.7 (15 Sep 2021 08:15), Max: 36.7 (15 Sep 2021 05:42)  T(F): 98 (15 Sep 2021 08:15), Max: 98.1 (15 Sep 2021 05:42)  HR: 70 (15 Sep 2021 08:15) (70 - 122)  BP: 151/77 (15 Sep 2021 08:15) (124/78 - 151/77)  BP(mean): --  RR: 17 (15 Sep 2021 08:15) (17 - 18)  SpO2: 97% (15 Sep 2021 08:15) (97% - 99%)    General:  Constitutional: Male, appears stated age, in no apparent distress including pain    Neurological:  MS: Awake, alert, oriented to person, place, time. Normal affect. Follows all commands.    Language: Speech is clear, fluent with good comprehension.    CNs: Visual Fields grossly full. B/l eyes cross midline in both directions, horizontally. No facial asymmetry b/l    Motor: Normal muscle bulk & tone. No noticeable tremor. No UE drift b/l. No LE drift b/l.     Sensation: Grossly intact.     Cortical: Extinction on DSS (neglect): none    Coordination: Dysmetria on L FTN. No dysmetria on R FTN.     Gait: deferred.    LABORATORY:  CBC                       13.6   9.48  )-----------( 213      ( 15 Sep 2021 06:53 )             39.1     Chem 09-15    136  |  104  |  31<H>  ----------------------------<  87  3.7   |  22  |  0.70    Ca    9.6      15 Sep 2021 06:53  Phos  3.0     09-15  Mg     2.20     09-15    TPro  7.3  /  Alb  4.0  /  TBili  1.1  /  DBili  x   /  AST  26  /  ALT  17  /  AlkPhos  62  09-15    LFTs LIVER FUNCTIONS - ( 15 Sep 2021 06:53 )  Alb: 4.0 g/dL / Pro: 7.3 g/dL / ALK PHOS: 62 U/L / ALT: 17 U/L / AST: 26 U/L / GGT: x             Radiology (XR, CT, MR, U/S, TTE/RUDY):    MRI brain w/o contrast:    INTERPRETATION:  Clinical indication: Hypertensive emergency. Cerebellar hemorrhage.    MRI of the brain was performed using sagittal T1, axial T1 and T2 T2 FLAIR diffusion and susceptibility weighted sequence. This exam is somewhat limited by lack of IV contrast.    Extensive parenchymal volume loss and chronic microvascular ischemic changes are identified. Abnormal signal with associated stability seen involving the cerebellar midline patella with extension into the right cerebellar hemisphere. This is compatible with areas of acute parenchymal hemorrhages. There is evidence of a hematocrit level seen associated with the midline hemorrhage. The midline hemorrhage measures approximately 1.9 x 5.0 cm. Contrast enhanced MRI can be done to ensure that there is no abnormal enhancing areas of enhancement which can be seen with associated neoplastic lesions. Localized mass effect is seen. No significant shift or herniation seen.    Prominent area of abnormal signal is seen involving the left cerebellar hemisphere which is likely compatible area of old hemorrhage.    Subcentimeter foci of susceptibility are seen throughout the posterior fossa and region which are compatible with small areas of old hemorrhage (possibly secondary to amyloid angiopathy) mineralization or small cavernous angiomas.    The large vessels demonstrates normal flow voids    Findings visualized paranasal sinuses appear clear.    Mild inflammatory changes are seen involving both mastoids right greater than left.    IMPRESSION: Parenchymal hemorrhage as described above. Contrast-enhanced MRI of the brain can be done to better evaluate this finding.    --- End of Report ---        CLINTON JOINER MD; Attending Radiologist  This document has been electronically signed. Sep 14 2021  3:16PM

## 2021-09-16 LAB
ALBUMIN SERPL ELPH-MCNC: 3.8 G/DL — SIGNIFICANT CHANGE UP (ref 3.3–5)
ALP SERPL-CCNC: 63 U/L — SIGNIFICANT CHANGE UP (ref 40–120)
ALT FLD-CCNC: 16 U/L — SIGNIFICANT CHANGE UP (ref 4–41)
ANION GAP SERPL CALC-SCNC: 12 MMOL/L — SIGNIFICANT CHANGE UP (ref 7–14)
AST SERPL-CCNC: 20 U/L — SIGNIFICANT CHANGE UP (ref 4–40)
BASOPHILS # BLD AUTO: 0.01 K/UL — SIGNIFICANT CHANGE UP (ref 0–0.2)
BASOPHILS NFR BLD AUTO: 0.1 % — SIGNIFICANT CHANGE UP (ref 0–2)
BILIRUB SERPL-MCNC: 0.8 MG/DL — SIGNIFICANT CHANGE UP (ref 0.2–1.2)
BUN SERPL-MCNC: 24 MG/DL — HIGH (ref 7–23)
CALCIUM SERPL-MCNC: 9.2 MG/DL — SIGNIFICANT CHANGE UP (ref 8.4–10.5)
CHLORIDE SERPL-SCNC: 101 MMOL/L — SIGNIFICANT CHANGE UP (ref 98–107)
CO2 SERPL-SCNC: 24 MMOL/L — SIGNIFICANT CHANGE UP (ref 22–31)
CREAT SERPL-MCNC: 0.56 MG/DL — SIGNIFICANT CHANGE UP (ref 0.5–1.3)
EOSINOPHIL # BLD AUTO: 0.02 K/UL — SIGNIFICANT CHANGE UP (ref 0–0.5)
EOSINOPHIL NFR BLD AUTO: 0.2 % — SIGNIFICANT CHANGE UP (ref 0–6)
GLUCOSE SERPL-MCNC: 108 MG/DL — HIGH (ref 70–99)
HCT VFR BLD CALC: 37.3 % — LOW (ref 39–50)
HGB BLD-MCNC: 13 G/DL — SIGNIFICANT CHANGE UP (ref 13–17)
IANC: 7.18 K/UL — SIGNIFICANT CHANGE UP (ref 1.5–8.5)
IMM GRANULOCYTES NFR BLD AUTO: 0.3 % — SIGNIFICANT CHANGE UP (ref 0–1.5)
LYMPHOCYTES # BLD AUTO: 0.69 K/UL — LOW (ref 1–3.3)
LYMPHOCYTES # BLD AUTO: 8 % — LOW (ref 13–44)
MAGNESIUM SERPL-MCNC: 2.1 MG/DL — SIGNIFICANT CHANGE UP (ref 1.6–2.6)
MCHC RBC-ENTMCNC: 33.2 PG — SIGNIFICANT CHANGE UP (ref 27–34)
MCHC RBC-ENTMCNC: 34.9 GM/DL — SIGNIFICANT CHANGE UP (ref 32–36)
MCV RBC AUTO: 95.4 FL — SIGNIFICANT CHANGE UP (ref 80–100)
MONOCYTES # BLD AUTO: 0.66 K/UL — SIGNIFICANT CHANGE UP (ref 0–0.9)
MONOCYTES NFR BLD AUTO: 7.7 % — SIGNIFICANT CHANGE UP (ref 2–14)
NEUTROPHILS # BLD AUTO: 7.18 K/UL — SIGNIFICANT CHANGE UP (ref 1.8–7.4)
NEUTROPHILS NFR BLD AUTO: 83.7 % — HIGH (ref 43–77)
NRBC # BLD: 0 /100 WBCS — SIGNIFICANT CHANGE UP
NRBC # FLD: 0 K/UL — SIGNIFICANT CHANGE UP
PHOSPHATE SERPL-MCNC: 2.1 MG/DL — LOW (ref 2.5–4.5)
PLATELET # BLD AUTO: 179 K/UL — SIGNIFICANT CHANGE UP (ref 150–400)
POTASSIUM SERPL-MCNC: 3.7 MMOL/L — SIGNIFICANT CHANGE UP (ref 3.5–5.3)
POTASSIUM SERPL-SCNC: 3.7 MMOL/L — SIGNIFICANT CHANGE UP (ref 3.5–5.3)
PROT SERPL-MCNC: 7.2 G/DL — SIGNIFICANT CHANGE UP (ref 6–8.3)
RBC # BLD: 3.91 M/UL — LOW (ref 4.2–5.8)
RBC # FLD: 12 % — SIGNIFICANT CHANGE UP (ref 10.3–14.5)
SODIUM SERPL-SCNC: 137 MMOL/L — SIGNIFICANT CHANGE UP (ref 135–145)
WBC # BLD: 8.59 K/UL — SIGNIFICANT CHANGE UP (ref 3.8–10.5)
WBC # FLD AUTO: 8.59 K/UL — SIGNIFICANT CHANGE UP (ref 3.8–10.5)

## 2021-09-16 PROCEDURE — 99233 SBSQ HOSP IP/OBS HIGH 50: CPT

## 2021-09-16 PROCEDURE — 99222 1ST HOSP IP/OBS MODERATE 55: CPT

## 2021-09-16 RX ORDER — SODIUM,POTASSIUM PHOSPHATES 278-250MG
1 POWDER IN PACKET (EA) ORAL THREE TIMES A DAY
Refills: 0 | Status: COMPLETED | OUTPATIENT
Start: 2021-09-16 | End: 2021-09-17

## 2021-09-16 RX ORDER — HYDRALAZINE HCL 50 MG
25 TABLET ORAL EVERY 8 HOURS
Refills: 0 | Status: DISCONTINUED | OUTPATIENT
Start: 2021-09-16 | End: 2021-09-17

## 2021-09-16 RX ORDER — POTASSIUM CHLORIDE 20 MEQ
40 PACKET (EA) ORAL ONCE
Refills: 0 | Status: COMPLETED | OUTPATIENT
Start: 2021-09-16 | End: 2021-09-16

## 2021-09-16 RX ORDER — ACETAMINOPHEN 500 MG
650 TABLET ORAL ONCE
Refills: 0 | Status: COMPLETED | OUTPATIENT
Start: 2021-09-16 | End: 2021-09-16

## 2021-09-16 RX ADMIN — FINASTERIDE 5 MILLIGRAM(S): 5 TABLET, FILM COATED ORAL at 12:55

## 2021-09-16 RX ADMIN — Medication 25 MILLIGRAM(S): at 21:21

## 2021-09-16 RX ADMIN — Medication 25 MILLIGRAM(S): at 14:49

## 2021-09-16 RX ADMIN — Medication 50 MILLIGRAM(S): at 18:04

## 2021-09-16 RX ADMIN — CHLORHEXIDINE GLUCONATE 1 APPLICATION(S): 213 SOLUTION TOPICAL at 05:28

## 2021-09-16 RX ADMIN — Medication 1 PACKET(S): at 12:54

## 2021-09-16 RX ADMIN — Medication 650 MILLIGRAM(S): at 10:37

## 2021-09-16 RX ADMIN — Medication 50 MILLIGRAM(S): at 05:25

## 2021-09-16 RX ADMIN — TAMSULOSIN HYDROCHLORIDE 0.4 MILLIGRAM(S): 0.4 CAPSULE ORAL at 21:15

## 2021-09-16 RX ADMIN — SENNA PLUS 2 TABLET(S): 8.6 TABLET ORAL at 21:16

## 2021-09-16 RX ADMIN — Medication 40 MILLIEQUIVALENT(S): at 12:55

## 2021-09-16 RX ADMIN — Medication 1 PACKET(S): at 21:21

## 2021-09-16 NOTE — PROGRESS NOTE ADULT - PROBLEM SELECTOR PLAN 3
-Patient was on metoprolol 25mg qdaily at home  -BPs elevated  -c/w metoprolol 50mg bid  -added Hydralazine 25mg tid for better BP control.   -Maintain SBP<130/80 since hemorrhagic CVA

## 2021-09-16 NOTE — PROVIDER CONTACT NOTE (OTHER) - REASON
Slight bleeding noted on patient's penis
Patient had 11 beats of Vtach
Patient convert to rapid A.fib 120s-150s
Urine output dark brown
/80

## 2021-09-16 NOTE — PROGRESS NOTE ADULT - PROBLEM SELECTOR PLAN 2
apparently daughter says patient has a H/O afib but not on AC for it  c/w metoprolol   start eliquis if Ct head negative in 4 weeks as per neurology

## 2021-09-16 NOTE — PROGRESS NOTE ADULT - ASSESSMENT
83 y/o male w/ Hx HTN, BPH presenting to [facility] as a transfer from [facility] due to intraparenchymal bleed in the cerebellum resulting in MICU admission for further management of blood pressure control and neuro checks. 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

## 2021-09-16 NOTE — PROGRESS NOTE ADULT - SUBJECTIVE AND OBJECTIVE BOX
Patient is a 85y old  Male who presents with a chief complaint of Cerebellar bleed (15 Sep 2021 11:49)      SUBJECTIVE / OVERNIGHT EVENTS:  Patient says he moved his bowel yesterday. He c/o of mild HA. Still has dizziness with standing. Denies cp, SOB, abdominal pain, N/V/D.     MEDICATIONS  (STANDING):  bisacodyl Suppository 10 milliGRAM(s) Rectal once  chlorhexidine 4% Liquid 1 Application(s) Topical <User Schedule>  finasteride 5 milliGRAM(s) Oral daily  hydrALAZINE 25 milliGRAM(s) Oral every 8 hours  influenza  Vaccine (HIGH DOSE) 0.7 milliLiter(s) IntraMuscular once  metoprolol tartrate 50 milliGRAM(s) Oral two times a day  polyethylene glycol 3350 17 Gram(s) Oral daily  potassium chloride    Tablet ER 40 milliEquivalent(s) Oral once  potassium phosphate / sodium phosphate Powder (PHOS-NaK) 1 Packet(s) Oral three times a day  senna 2 Tablet(s) Oral at bedtime  tamsulosin 0.4 milliGRAM(s) Oral at bedtime    MEDICATIONS  (PRN):      Vital Signs Last 24 Hrs  T(C): 36.8 (16 Sep 2021 10:35), Max: 36.9 (15 Sep 2021 12:01)  T(F): 98.3 (16 Sep 2021 10:35), Max: 98.5 (15 Sep 2021 12:01)  HR: 70 (16 Sep 2021 10:35) (66 - 79)  BP: 136/78 (16 Sep 2021 10:35) (136/78 - 153/81)  BP(mean): --  RR: 18 (16 Sep 2021 10:35) (17 - 18)  SpO2: 98% (16 Sep 2021 10:35) (97% - 100%)  CAPILLARY BLOOD GLUCOSE        I&O's Summary      PHYSICAL EXAM:  GENERAL: NAD, cachetic  HEAD:  Atraumatic, Normocephalic  EYES: EOMI, PERRLA, conjunctiva and sclera clear  NECK: Supple, No JVD  CHEST/LUNG: Clear to auscultation bilaterally; No wheeze  HEART: Regular rate and rhythm; No murmurs, rubs, or gallops  ABDOMEN: Soft, Nontender, Nondistended; Bowel sounds present  EXTREMITIES:  2+ Peripheral Pulses, No clubbing, cyanosis, or edema  PSYCH: AAOx3  NEUROLOGY: non-focal  SKIN: No rashes or lesions    LABS:                        13.0   8.59  )-----------( 179      ( 16 Sep 2021 06:47 )             37.3     09-16    137  |  101  |  24<H>  ----------------------------<  108<H>  3.7   |  24  |  0.56    Ca    9.2      16 Sep 2021 06:47  Phos  2.1     09-16  Mg     2.10     09-16    TPro  7.2  /  Alb  3.8  /  TBili  0.8  /  DBili  x   /  AST  20  /  ALT  16  /  AlkPhos  63  09-16              RADIOLOGY & ADDITIONAL TESTS:    Imaging Personally Reviewed:    Consultant(s) Notes Reviewed:      Care Discussed with Consultants/Other Providers:

## 2021-09-16 NOTE — PROGRESS NOTE ADULT - PROBLEM SELECTOR PLAN 1
-CTA Head and Neck with intraparenchymal hemorrhage of cerebellum performed on 9/11. Showed no significant stenosis in the R and L carotid. Acute intraparenchymal hemorrhage in the cerebellum at the midline and right of midline measuring approximately 1.2 x 4.4 cm with surrounding mild vasogenic edema.  -Repeat Head CT on 9/12 showed no acute change from 9/11.   -NeuroSx assessed no surgical intervention.   -Maintain SBP<130/80  -MRI again showed hemorrhage CVA  -repeat Ct head in 4 weeks ; If CT head negative in 4 weeks Start Eliquis for afib as per neurology  -PT rec rehab

## 2021-09-16 NOTE — PROVIDER CONTACT NOTE (OTHER) - RECOMMENDATIONS
Provider notified.
provider notified
Provider notified
Provider notified and assessed patient at bedside.
Provider notified.

## 2021-09-16 NOTE — PROVIDER CONTACT NOTE (OTHER) - ASSESSMENT
patient denies headache or chest pain. AxOx4 and alert. asymptomatic. no change in neuro baseline assessment.

## 2021-09-16 NOTE — PROVIDER CONTACT NOTE (OTHER) - SITUATION
Slight bleeding noted on patient's penis
/80
Patient convert to rapid A.fib 120s-150s
Patient had 11 beats of Vtach
Urine output dark brown

## 2021-09-16 NOTE — PROVIDER CONTACT NOTE (OTHER) - BACKGROUND
patient admit with intracerebral hemorrhage
patient admit with nontraumatic intracerebral hemorrhage.
patient admit with intracerebral hemorrhage
Patient admitted for nontraumatic intracerebral hemorrhage of cerebellum. Patient has a past medical history of Afib, BPH, lumbar disc herniation, and HTN.
admit with nontraumatic intracerebral hemorrhage

## 2021-09-17 ENCOUNTER — TRANSCRIPTION ENCOUNTER (OUTPATIENT)
Age: 86
End: 2021-09-17

## 2021-09-17 VITALS
TEMPERATURE: 99 F | RESPIRATION RATE: 18 BRPM | OXYGEN SATURATION: 99 % | SYSTOLIC BLOOD PRESSURE: 149 MMHG | DIASTOLIC BLOOD PRESSURE: 80 MMHG | HEART RATE: 69 BPM

## 2021-09-17 DIAGNOSIS — Z29.9 ENCOUNTER FOR PROPHYLACTIC MEASURES, UNSPECIFIED: ICD-10-CM

## 2021-09-17 LAB
ALBUMIN SERPL ELPH-MCNC: 3.6 G/DL — SIGNIFICANT CHANGE UP (ref 3.3–5)
ALP SERPL-CCNC: 57 U/L — SIGNIFICANT CHANGE UP (ref 40–120)
ALT FLD-CCNC: 9 U/L — SIGNIFICANT CHANGE UP (ref 4–41)
ANION GAP SERPL CALC-SCNC: 11 MMOL/L — SIGNIFICANT CHANGE UP (ref 7–14)
AST SERPL-CCNC: 19 U/L — SIGNIFICANT CHANGE UP (ref 4–40)
BASOPHILS # BLD AUTO: 0.01 K/UL — SIGNIFICANT CHANGE UP (ref 0–0.2)
BASOPHILS NFR BLD AUTO: 0.1 % — SIGNIFICANT CHANGE UP (ref 0–2)
BILIRUB SERPL-MCNC: 0.6 MG/DL — SIGNIFICANT CHANGE UP (ref 0.2–1.2)
BUN SERPL-MCNC: 22 MG/DL — SIGNIFICANT CHANGE UP (ref 7–23)
CALCIUM SERPL-MCNC: 9.2 MG/DL — SIGNIFICANT CHANGE UP (ref 8.4–10.5)
CHLORIDE SERPL-SCNC: 99 MMOL/L — SIGNIFICANT CHANGE UP (ref 98–107)
CHOLEST SERPL-MCNC: 185 MG/DL — SIGNIFICANT CHANGE UP
CO2 SERPL-SCNC: 26 MMOL/L — SIGNIFICANT CHANGE UP (ref 22–31)
CREAT SERPL-MCNC: 0.6 MG/DL — SIGNIFICANT CHANGE UP (ref 0.5–1.3)
EOSINOPHIL # BLD AUTO: 0.05 K/UL — SIGNIFICANT CHANGE UP (ref 0–0.5)
EOSINOPHIL NFR BLD AUTO: 0.6 % — SIGNIFICANT CHANGE UP (ref 0–6)
GLUCOSE SERPL-MCNC: 113 MG/DL — HIGH (ref 70–99)
HCT VFR BLD CALC: 35.1 % — LOW (ref 39–50)
HDLC SERPL-MCNC: 82 MG/DL — SIGNIFICANT CHANGE UP
HGB BLD-MCNC: 12.4 G/DL — LOW (ref 13–17)
IANC: 6.62 K/UL — SIGNIFICANT CHANGE UP (ref 1.5–8.5)
IMM GRANULOCYTES NFR BLD AUTO: 0.5 % — SIGNIFICANT CHANGE UP (ref 0–1.5)
LIPID PNL WITH DIRECT LDL SERPL: 91 MG/DL — SIGNIFICANT CHANGE UP
LYMPHOCYTES # BLD AUTO: 0.71 K/UL — LOW (ref 1–3.3)
LYMPHOCYTES # BLD AUTO: 8.7 % — LOW (ref 13–44)
MAGNESIUM SERPL-MCNC: 2 MG/DL — SIGNIFICANT CHANGE UP (ref 1.6–2.6)
MCHC RBC-ENTMCNC: 33.5 PG — SIGNIFICANT CHANGE UP (ref 27–34)
MCHC RBC-ENTMCNC: 35.3 GM/DL — SIGNIFICANT CHANGE UP (ref 32–36)
MCV RBC AUTO: 94.9 FL — SIGNIFICANT CHANGE UP (ref 80–100)
MONOCYTES # BLD AUTO: 0.75 K/UL — SIGNIFICANT CHANGE UP (ref 0–0.9)
MONOCYTES NFR BLD AUTO: 9.2 % — SIGNIFICANT CHANGE UP (ref 2–14)
NEUTROPHILS # BLD AUTO: 6.62 K/UL — SIGNIFICANT CHANGE UP (ref 1.8–7.4)
NEUTROPHILS NFR BLD AUTO: 80.9 % — HIGH (ref 43–77)
NON HDL CHOLESTEROL: 103 MG/DL — SIGNIFICANT CHANGE UP
NRBC # BLD: 0 /100 WBCS — SIGNIFICANT CHANGE UP
NRBC # FLD: 0 K/UL — SIGNIFICANT CHANGE UP
PHOSPHATE SERPL-MCNC: 3.2 MG/DL — SIGNIFICANT CHANGE UP (ref 2.5–4.5)
PLATELET # BLD AUTO: 190 K/UL — SIGNIFICANT CHANGE UP (ref 150–400)
POTASSIUM SERPL-MCNC: 4 MMOL/L — SIGNIFICANT CHANGE UP (ref 3.5–5.3)
POTASSIUM SERPL-SCNC: 4 MMOL/L — SIGNIFICANT CHANGE UP (ref 3.5–5.3)
PROT SERPL-MCNC: 6.9 G/DL — SIGNIFICANT CHANGE UP (ref 6–8.3)
RBC # BLD: 3.7 M/UL — LOW (ref 4.2–5.8)
RBC # FLD: 12 % — SIGNIFICANT CHANGE UP (ref 10.3–14.5)
SODIUM SERPL-SCNC: 136 MMOL/L — SIGNIFICANT CHANGE UP (ref 135–145)
TRIGL SERPL-MCNC: 59 MG/DL — SIGNIFICANT CHANGE UP
WBC # BLD: 8.18 K/UL — SIGNIFICANT CHANGE UP (ref 3.8–10.5)
WBC # FLD AUTO: 8.18 K/UL — SIGNIFICANT CHANGE UP (ref 3.8–10.5)

## 2021-09-17 PROCEDURE — 99233 SBSQ HOSP IP/OBS HIGH 50: CPT

## 2021-09-17 RX ORDER — ATORVASTATIN CALCIUM 80 MG/1
80 TABLET, FILM COATED ORAL AT BEDTIME
Refills: 0 | Status: DISCONTINUED | OUTPATIENT
Start: 2021-09-17 | End: 2021-09-17

## 2021-09-17 RX ORDER — HYDRALAZINE HCL 50 MG
1 TABLET ORAL
Qty: 0 | Refills: 0 | DISCHARGE
Start: 2021-09-17

## 2021-09-17 RX ORDER — QUINIDINE SULFATE 200 MG
1 TABLET ORAL
Qty: 0 | Refills: 0 | DISCHARGE

## 2021-09-17 RX ORDER — FAMOTIDINE 10 MG/ML
1 INJECTION INTRAVENOUS
Qty: 0 | Refills: 0 | DISCHARGE

## 2021-09-17 RX ORDER — METOPROLOL TARTRATE 50 MG
1 TABLET ORAL
Qty: 0 | Refills: 0 | DISCHARGE
Start: 2021-09-17

## 2021-09-17 RX ORDER — MECLIZINE HCL 12.5 MG
25 TABLET ORAL EVERY 8 HOURS
Refills: 0 | Status: DISCONTINUED | OUTPATIENT
Start: 2021-09-17 | End: 2021-09-17

## 2021-09-17 RX ORDER — SENNA PLUS 8.6 MG/1
2 TABLET ORAL
Qty: 0 | Refills: 0 | DISCHARGE
Start: 2021-09-17

## 2021-09-17 RX ORDER — CLOPIDOGREL BISULFATE 75 MG/1
1 TABLET, FILM COATED ORAL
Qty: 0 | Refills: 0 | DISCHARGE

## 2021-09-17 RX ORDER — POLYETHYLENE GLYCOL 3350 17 G/17G
17 POWDER, FOR SOLUTION ORAL
Qty: 0 | Refills: 0 | DISCHARGE
Start: 2021-09-17

## 2021-09-17 RX ORDER — METOPROLOL TARTRATE 50 MG
1 TABLET ORAL
Qty: 0 | Refills: 0 | DISCHARGE

## 2021-09-17 RX ORDER — ATORVASTATIN CALCIUM 80 MG/1
1 TABLET, FILM COATED ORAL
Qty: 0 | Refills: 0 | DISCHARGE
Start: 2021-09-17

## 2021-09-17 RX ORDER — PANTOPRAZOLE SODIUM 20 MG/1
1 TABLET, DELAYED RELEASE ORAL
Qty: 0 | Refills: 0 | DISCHARGE

## 2021-09-17 RX ADMIN — FINASTERIDE 5 MILLIGRAM(S): 5 TABLET, FILM COATED ORAL at 14:00

## 2021-09-17 RX ADMIN — Medication 25 MILLIGRAM(S): at 14:01

## 2021-09-17 RX ADMIN — Medication 1 PACKET(S): at 05:13

## 2021-09-17 RX ADMIN — POLYETHYLENE GLYCOL 3350 17 GRAM(S): 17 POWDER, FOR SOLUTION ORAL at 14:00

## 2021-09-17 RX ADMIN — Medication 50 MILLIGRAM(S): at 17:20

## 2021-09-17 RX ADMIN — CHLORHEXIDINE GLUCONATE 1 APPLICATION(S): 213 SOLUTION TOPICAL at 06:22

## 2021-09-17 RX ADMIN — Medication 25 MILLIGRAM(S): at 05:16

## 2021-09-17 RX ADMIN — Medication 25 MILLIGRAM(S): at 14:28

## 2021-09-17 RX ADMIN — Medication 50 MILLIGRAM(S): at 05:13

## 2021-09-17 NOTE — PROGRESS NOTE ADULT - PROBLEM SELECTOR PLAN 5
resolved with Dulcolax suppository  c/w miralax/senna

## 2021-09-17 NOTE — DISCHARGE NOTE PROVIDER - HOSPITAL COURSE
83 y/o male w/ Hx HTN, BPH presenting to [facility] as a transfer from [facility] due to intraparenchymal bleed in the cerebellum resulting in MICU admission for further management of blood pressure control and neuro checks. 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  Hospital Course    ·  Problem: Hemorrhagic cerebrovascular accident (CVA).   ·  Plan: -CTA Head and Neck with intraparenchymal hemorrhage of cerebellum performed on 9/11. Showed no significant stenosis in the R and L carotid. Acute intraparenchymal hemorrhage in the cerebellum at the midline and right of midline measuring approximately 1.2 x 4.4 cm with surrounding mild vasogenic edema.  -Repeat Head CT on 9/12 showed no acute change from 9/11.   -NeuroSx assessed no surgical intervention.   -Maintain SBP<130/80  -MRI again showed hemorrhage CVA  -repeat Ct head in 4 weeks ; If CT head negative in 4 weeks Start Eliquis for afib as per neurology  -PT rec rehab.     Problem/Plan - 2:  ·  Problem: Afib.   ·  Plan: apparently daughter says patient has a H/O afib but not on AC for it  c/w metoprolol   start eliquis if Ct head negative in 4 weeks as per neurology.     Problem/Plan - 3:  ·  Problem: HTN (hypertension).   ·  Plan: -Patient was on metoprolol 25mg qdaily at home  -BPs elevated  -c/w metoprolol 50mg bid  -added Hydralazine 25mg tid for better BP control.   -Maintain SBP<130/80 since hemorrhagic CVA.     Problem/Plan - 4:  ·  Problem: BPH without urinary obstruction.   ·  Plan: c/w Flomax and finasteride  d/c'ed  bundy   passed TOV.     Problem/Plan - 5:  ·  Problem: Constipation.   ·  Plan: resolved with Dulcolax suppository  c/w miralax/senna.     83 y/o male w/ Hx HTN, BPH presenting to [facility] as a transfer from [facility] due to intraparenchymal bleed in the cerebellum resulting in MICU admission for further management of blood pressure control and neuro checks. 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  Hospital Course  · Hemorrhagic cerebrovascular accident (CVA).   CTA Head and Neck with intraparenchymal hemorrhage of cerebellum performed on 9/11. Showed no significant stenosis in the R and L carotid. Acute intraparenchymal hemorrhage in the cerebellum at the midline and right of midline measuring approximately 1.2 x 4.4 cm with surrounding mild vasogenic edema. Confirmed by MRI of the Head  -Repeat Head CT on 9/12 showed no acute change from 9/11. Seen by Neurosurgery, No surgical Intervention warranted  Plan to repeat CT Head in 4 weeks, If CT head negative, Then Can Start Eliquis for afib as per neurology  -PT rec rehab.    · Afib. Eliquis on Hold for Now, plan to restart in 4 weeks if Hemorrhage is stable   c/w metoprolol     ·HTN (hypertension). Continue with Current Meds    · BPH without urinary obstruction. c/w Flomax and finasteride    · Constipation: resolved with Dulcolax suppository, c/w miralax/senna.  Case Discussed with attending, pt is stable for Discharge to Rehab

## 2021-09-17 NOTE — CHART NOTE - NSCHARTNOTEFT_GEN_A_CORE
Pt is stable for discharge from Neurological perspective.     Hold all antiplatelet or potential anticoagulation therapy for 4 weeks from presentation. If rCTH after 4 weeks shows a stable hemorrhage, start on Anticoagulation agent for Afib    Patient should follow up with Stroke NP, Radha Herrera, in clinic at [address] [place] [address]. Please email [email or web address] w/ basic PHI.    Please reconsult Neurology, if needed. Pt is stable for discharge from Neurovascular perspective.     Hold all antiplatelet or potential anticoagulation therapy for 4 weeks from presentation. If rCTH after 4 weeks shows a stable hemorrhage, start on Anticoagulation agent for Afib    Patient should follow up with Stroke NP, Radha Herrera, in clinic at [address] [place] [address]. Please email [email or web address] w/ basic PHI.    Please reconsult Neurology, if needed.

## 2021-09-17 NOTE — DISCHARGE NOTE PROVIDER - NSDCCPCAREPLAN_GEN_ALL_CORE_FT
PRINCIPAL DISCHARGE DIAGNOSIS  Diagnosis: Acute cerebellar hemorrhage  Assessment and Plan of Treatment: You were Diagnosed with a hemorrhagic Stroke, Lizzy Eliquis is currently on Hold, You will need a repeat Head CT in 4 weeks  If Stable, Then Ok to REsume Eliquis  Please follow up with Neurology in 2 weeks      SECONDARY DISCHARGE DIAGNOSES  Diagnosis: BPH without urinary obstruction  Assessment and Plan of Treatment: Continue with your Current Meds    Diagnosis: Afib  Assessment and Plan of Treatment: YOur Eliquis is currently on HOld, You Need to repeat Head CT in 4 weeks, If Stable then it would be OK to resume Eliquis    Diagnosis: HTN (hypertension)  Assessment and Plan of Treatment: Low sodium and fat diet, continue anti-hypertensive medications, and follow up with primary care physician.       PRINCIPAL DISCHARGE DIAGNOSIS  Diagnosis: Acute cerebellar hemorrhage  Assessment and Plan of Treatment: You were Diagnosed with a hemorrhagic Stroke, Lizzy Eliquis is currently on Hold, You will need a repeat Head CT in 4 weeks ( From 9/11/21)   If Stable, Then Ok to REsume Eliquis  Please follow up with Neurology in 2 weeks      SECONDARY DISCHARGE DIAGNOSES  Diagnosis: BPH without urinary obstruction  Assessment and Plan of Treatment: Continue with your Current Meds    Diagnosis: Afib  Assessment and Plan of Treatment: YOur Eliquis is currently on HOld, You Need to repeat Head CT in 4 weeks, If Stable then it would be OK to resume Eliquis    Diagnosis: HTN (hypertension)  Assessment and Plan of Treatment: Low sodium and fat diet, continue anti-hypertensive medications, and follow up with primary care physician.

## 2021-09-17 NOTE — PROGRESS NOTE ADULT - PROBLEM SELECTOR PROBLEM 5
2nd left message, advised sleep study does reveal EMRE and recommends CPAP  Advised he does have appointment scheduled Monday 2/24/20 in [place]  Advised to call with questions or concerns 
Left message for patient to call office  Sleep study reveals severe EMRE, recommend CPAP    Already has DME set up scheduled for 2/24/20 at [facility] aware      Compliance appointment already scheduled 4/27/20
the patient that he missed his scheduled appointment to get CPAP  He rescheduled DME set up   Emailed appointment information to Shauna nickerson
Constipation

## 2021-09-17 NOTE — DISCHARGE NOTE PROVIDER - NSDCFUADDAPPT_GEN_ALL_CORE_FT
Patient should follow up with Stroke NP, Radha Herrera, in clinic at [address] [place] [address]. Please email [email or web address] w/ basic PHI.

## 2021-09-17 NOTE — DISCHARGE NOTE PROVIDER - CARE PROVIDER_API CALL
Radha Herrera (NP; RN)  NP in Family Health  [address] [facility], [facility] [address]  [place] [address]  Phone: [phone]  Fax: [phone]  Follow Up Time:

## 2021-09-17 NOTE — PROGRESS NOTE ADULT - ASSESSMENT
85 y/o male w/ Hx HTN, BPH presenting to [facility] as a transfer from [facility] due to intraparenchymal bleed in the cerebellum resulting in MICU admission for further management of blood pressure control and neuro checks. 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                              83 y/o male w/ Hx HTN, BPH presenting to [facility] as a transfer from [facility] due to intraparenchymal bleed in the cerebellum resulting in MICU admission for further management of blood pressure control and neuro checks transferred to the floors for further management. Repeat Ct head on 9/12 shows no change in hemorrhagic cerebellar CVA.

## 2021-09-17 NOTE — PROGRESS NOTE ADULT - PROBLEM SELECTOR PROBLEM 1
Hemorrhagic cerebrovascular accident (CVA)

## 2021-09-17 NOTE — DISCHARGE NOTE PROVIDER - NSDCMRMEDTOKEN_GEN_ALL_CORE_FT
clopidogrel 75 mg oral tablet: 1 tab(s) orally once a day  famotidine 40 mg oral tablet: 1 tab(s) orally once a day (at bedtime)  finasteride 5 mg oral tablet: 1 tab(s) orally once a day  metoprolol succinate 25 mg oral tablet, extended release: 1 tab(s) orally once a day  pantoprazole 40 mg oral delayed release tablet: 1 tab(s) orally once a day  quiNIDine 324 mg oral tablet, extended release: 1 tab(s) orally once a day  tamsulosin 0.4 mg oral capsule: 1 cap(s) orally once a day   finasteride 5 mg oral tablet: 1 tab(s) orally once a day  hydrALAZINE 25 mg oral tablet: 1 tab(s) orally every 8 hours  metoprolol tartrate 50 mg oral tablet: 1 tab(s) orally 2 times a day  polyethylene glycol 3350 oral powder for reconstitution: 17 gram(s) orally once a day  senna oral tablet: 2 tab(s) orally once a day (at bedtime)  tamsulosin 0.4 mg oral capsule: 1 cap(s) orally once a day   atorvastatin 80 mg oral tablet: 1 tab(s) orally once a day (at bedtime)  finasteride 5 mg oral tablet: 1 tab(s) orally once a day  hydrALAZINE 25 mg oral tablet: 1 tab(s) orally every 8 hours  meclizine 25 mg oral tablet: 1 tab(s) orally 3 times a day, As Needed  for dizziness  metoprolol tartrate 50 mg oral tablet: 1 tab(s) orally 2 times a day  polyethylene glycol 3350 oral powder for reconstitution: 17 gram(s) orally once a day  senna oral tablet: 2 tab(s) orally once a day (at bedtime)  tamsulosin 0.4 mg oral capsule: 1 cap(s) orally once a day

## 2021-09-17 NOTE — PROGRESS NOTE ADULT - REASON FOR ADMISSION
Cerebellar bleed

## 2021-09-17 NOTE — PROGRESS NOTE ADULT - PROBLEM SELECTOR PLAN 3
-Patient was on metoprolol 25mg qdaily at home  -BPs elevated  -c/w metoprolol 50mg bid  -added Hydralazine 25mg tid for better BP control.   -Maintain SBP<130/80 since hemorrhagic CVA -Patient was on metoprolol 25mg qdaily at home  -BPs better controlled today  -c/w metoprolol 50mg bid  -c/w Hydralazine 25mg tid for better BP control.   -Maintain SBP<130/80 since hemorrhagic CVA

## 2021-09-17 NOTE — DISCHARGE NOTE PROVIDER - NSDCQMSTROKERISK_NEU_ALL_CORE
Atrial fibrillation/High blood pressure/History of a stroke or TIA Atrial fibrillation/High blood pressure

## 2021-09-17 NOTE — DISCHARGE NOTE NURSING/CASE MANAGEMENT/SOCIAL WORK - PATIENT PORTAL LINK FT
You can access the FollowMyHealth Patient Portal offered by [facility] by registering at the following website: [email or web address]. By joining [employer]’s FollowMyHealth portal, you will also be able to view your health information using other applications (apps) compatible with our system.

## 2021-09-17 NOTE — PROGRESS NOTE ADULT - PROBLEM SELECTOR PLAN 1
-CTA Head and Neck with intraparenchymal hemorrhage of cerebellum performed on 9/11. Showed no significant stenosis in the R and L carotid. Acute intraparenchymal hemorrhage in the cerebellum at the midline and right of midline measuring approximately 1.2 x 4.4 cm with surrounding mild vasogenic edema.  -Repeat Head CT on 9/12 showed no acute change from 9/11.   -NeuroSx assessed no surgical intervention.   -Maintain SBP<130/80  -MRI again showed hemorrhage CVA  -repeat Ct head in 4 weeks ; If CT head negative in 4 weeks Start Eliquis for afib as per neurology  -PT rec rehab -CTA Head and Neck with intraparenchymal hemorrhage of cerebellum performed on 9/11. Showed no significant stenosis in the R and L carotid. Acute intraparenchymal hemorrhage in the cerebellum at the midline and right of midline measuring approximately 1.2 x 4.4 cm with surrounding mild vasogenic edema.  -Repeat Head CT on 9/12 showed no acute change from 9/11.   -NeuroSx assessed no surgical intervention.   -Maintain SBP<130/80  -MRI again showed hemorrhage CVA  -PT with dizziness upon standing. Possibly orthostatic hypotension. Will check orthostatics.  -repeat Ct head in 4 weeks ; If CT head negative in 4 weeks Start Eliquis for afib as per neurology  -TTE as outpatient   -PT rec rehab

## 2021-09-17 NOTE — PROGRESS NOTE ADULT - SUBJECTIVE AND OBJECTIVE BOX
MARIELY QUINTEROS  85y  Male      Patient is a 85y old  Male who presents with a chief complaint of Cerebellar bleed (16 Sep 2021 12:00)      INTERVAL HPI/OVERNIGHT EVENTS: Pt seen and examined        REVIEW OF SYSTEMS:  As per hpi    T(C): 36.5 (09-17-21 @ 05:15), Max: 36.8 (09-16-21 @ 10:35)  HR: 76 (09-17-21 @ 05:15) (69 - 76)  BP: 126/76 (09-17-21 @ 05:15) (126/76 - 138/71)  RR: 17 (09-17-21 @ 05:15) (17 - 18)  SpO2: 98% (09-17-21 @ 05:15) (98% - 99%)  Wt(kg): --Vital Signs Last 24 Hrs  T(C): 36.5 (17 Sep 2021 05:15), Max: 36.8 (16 Sep 2021 10:35)  T(F): 97.7 (17 Sep 2021 05:15), Max: 98.3 (16 Sep 2021 10:35)  HR: 76 (17 Sep 2021 05:15) (69 - 76)  BP: 126/76 (17 Sep 2021 05:15) (126/76 - 138/71)  BP(mean): --  RR: 17 (17 Sep 2021 05:15) (17 - 18)  SpO2: 98% (17 Sep 2021 05:15) (98% - 99%)    PHYSICAL EXAM:  GENERAL: NAD, cachetic  HEAD:  Atraumatic, Normocephalic  EYES: EOMI, PERRLA, conjunctiva and sclera clear  NECK: Supple, No JVD  CHEST/LUNG: Clear to auscultation bilaterally; No wheeze  HEART: Regular rate and rhythm; No murmurs, rubs, or gallops  ABDOMEN: Soft, Nontender, Nondistended; Bowel sounds present  EXTREMITIES:  2+ Peripheral Pulses, No clubbing, cyanosis, or edema  PSYCH: AAOx3  NEUROLOGY: non-focal  SKIN: No rashes or lesions      Consultant(s) Notes Reviewed:  [x ] YES  [ ] NO  Care Discussed with Consultants/Other Providers [ x] YES  [ ] NO    LABS:                        12.4   8.18  )-----------( 190      ( 17 Sep 2021 07:24 )             35.1     09-17    136  |  99  |  x   ----------------------------<  x   4.0   |  x   |  x     Ca    9.2      16 Sep 2021 06:47  Phos  2.1     09-16  Mg     2.10     09-16    TPro  7.2  /  Alb  3.8  /  TBili  0.8  /  DBili  x   /  AST  20  /  ALT  16  /  AlkPhos  63  09-16        CAPILLARY BLOOD GLUCOSE                RADIOLOGY & ADDITIONAL TESTS:    Imaging Personally Reviewed:  [ ] YES  [ ] NO MARIELY QUINTEROS  85y  Male      Patient is a 85y old  Male who presents with a chief complaint of Cerebellar bleed (16 Sep 2021 12:00)      INTERVAL HPI/OVERNIGHT EVENTS: Pt seen and examined. Pt reports dizziness when going from sitting to standing. Otherwise no chest pain, shortness of breath or new weakness. No further episodes of vtach on the monitor       REVIEW OF SYSTEMS:  As per hpi    T(C): 36.5 (09-17-21 @ 05:15), Max: 36.8 (09-16-21 @ 10:35)  HR: 76 (09-17-21 @ 05:15) (69 - 76)  BP: 126/76 (09-17-21 @ 05:15) (126/76 - 138/71)  RR: 17 (09-17-21 @ 05:15) (17 - 18)  SpO2: 98% (09-17-21 @ 05:15) (98% - 99%)  Wt(kg): --Vital Signs Last 24 Hrs  T(C): 36.5 (17 Sep 2021 05:15), Max: 36.8 (16 Sep 2021 10:35)  T(F): 97.7 (17 Sep 2021 05:15), Max: 98.3 (16 Sep 2021 10:35)  HR: 76 (17 Sep 2021 05:15) (69 - 76)  BP: 126/76 (17 Sep 2021 05:15) (126/76 - 138/71)  BP(mean): --  RR: 17 (17 Sep 2021 05:15) (17 - 18)  SpO2: 98% (17 Sep 2021 05:15) (98% - 99%)    PHYSICAL EXAM:  GENERAL: NAD, cachetic  HEAD:  Atraumatic, Normocephalic  EYES: EOMI, PERRLA, conjunctiva and sclera clear  NECK: Supple, No JVD  CHEST/LUNG: Clear to auscultation bilaterally; No wheeze  HEART: Regular rate and rhythm; No murmurs, rubs, or gallops  ABDOMEN: Soft, Nontender, Nondistended; Bowel sounds present  EXTREMITIES:  2+ Peripheral Pulses, No clubbing, cyanosis, or edema  PSYCH: AAOx3  NEUROLOGY: No focal exam  SKIN: No rashes or lesions      Consultant(s) Notes Reviewed:  [x ] YES  [ ] NO  Care Discussed with Consultants/Other Providers [ x] YES  [ ] NO    LABS:                        12.4   8.18  )-----------( 190      ( 17 Sep 2021 07:24 )             35.1     09-17    136  |  99  |  x   ----------------------------<  x   4.0   |  x   |  x     Ca    9.2      16 Sep 2021 06:47  Phos  2.1     09-16  Mg     2.10     09-16    TPro  7.2  /  Alb  3.8  /  TBili  0.8  /  DBili  x   /  AST  20  /  ALT  16  /  AlkPhos  63  09-16        CAPILLARY BLOOD GLUCOSE                RADIOLOGY & ADDITIONAL TESTS:    Imaging Personally Reviewed:  [ ] YES  [ ] NO

## 2021-12-02 NOTE — PROGRESS NOTE ADULT - PROBLEM SELECTOR PLAN 1
12/02/21    Patient ID: Dahiana is a 59 year old female.    Chief Complaint   Patient presents with   • Office Visit     Pt is still having Vertigo, she now has sinus issues, headache, right ear pain, Pt says she feels itchy           HPI    Chart reviewed    Dahiana is a nice patient who is here today stating that she continues to have vertigo, she is using meclizine 3 times a day which sometimes helps and sometimes not.  She also have some sinus symptoms with congestion and discharge, she was seen by my associate and given prednisone which helped until it finished.  She is off work because she is afraid to drive.  She also noticed that she is itching all over for about 2 weeks, moisturizer over-the-counter not helping. There is no rash      .  ALLERGIES:  No Known Allergies      Current Outpatient Medications   Medication Sig Dispense Refill   • meclizine (ANTIVERT) 25 MG tablet 1 tablet  As needed for dizziness 3 times daily 40 tablet 1   • mometasone (NASONEX) 50 MCG/ACT nasal spray 1-2 puffs each nostril as needed for allergy symptoms 17 g 3     No current facility-administered medications for this visit.         Patient Active Problem List   Diagnosis   • Hypertriglyceridemia   • Hypothyroid   • Macrocytosis   • Feels cold   • Trochanteric bursitis   • Vitamin D deficiency   • Encounter for general adult medical examination w/o abnormal findings   • Hospital discharge follow-up   • Benign paroxysmal positional vertigo of right ear          Past Surgical History:   Procedure Laterality Date   • Appendectomy     • Back surgery     • Colonoscopy  2014   • Elbow surgery     • Hysterectomy           Family History   Problem Relation Age of Onset   • Stroke/TIA Mother    • Diabetes Mother    • Liver Disease Father    • Motor Vehicle Accident Sister          Social History     Tobacco Use   • Smoking status: Never Smoker   • Smokeless tobacco: Never Used   Vaping Use   • Vaping Use: never used   Substance Use  Topics   • Alcohol use: Yes   • Drug use: Not on file        Sexually Active: Not Asked             Immunization History   Administered Date(s) Administered   • COVID-19 18Y+ Esmer/[employer] 03/20/2021, 03/20/2021, 03/20/2021   • Influenza, injectable, quadrivalent, preservative-free 12/08/2014, 12/28/2015, 01/18/2017, 01/24/2019, 11/08/2021   • TD Adult, Adsorbed 10/01/2000, 10/01/2000   • Tdap 12/28/2015        Review of Systems   Constitutional: Negative for fatigue.   HENT:        See HPI   Respiratory: Negative for cough, shortness of breath and wheezing.    Cardiovascular: Negative for chest pain and palpitations.        No shortness of breath   Gastrointestinal: Negative for abdominal pain, nausea and vomiting.   Skin:        See HPI   Neurological: Negative for seizures and weakness.        No acute focal symptoms   Hematological: Does not bruise/bleed easily.   Psychiatric/Behavioral: Negative for confusion. The patient is not nervous/anxious.        Physical Exam:  Visit Vitals  Pulse 66   Temp 97.3 °F (36.3 °C)   Resp 18   Ht 5' 7\" (1.702 m)   Wt 90.7 kg (199 lb 15.3 oz)   SpO2 98%   BMI 31.32 kg/m²       Physical Exam  Constitutional:       General: She is not in acute distress.     Appearance: She is not diaphoretic.   HENT:      Head: Normocephalic and atraumatic.      Right Ear: Tympanic membrane, ear canal and external ear normal.      Left Ear: Tympanic membrane, ear canal and external ear normal.      Nose:      Comments: No sinus tenderness     Mouth/Throat:      Comments: Normal oropharyngeal cavity  Eyes:      Conjunctiva/sclera: Conjunctivae normal.   Neck:      Comments: No lymphadenopathy in the neck  Cardiovascular:      Rate and Rhythm: Normal rate and regular rhythm.      Heart sounds: Normal heart sounds. No gallop.    Pulmonary:      Effort: Pulmonary effort is normal.      Breath sounds: Normal breath sounds. No wheezing.   Abdominal:      General: There is no distension.       Palpations: Abdomen is soft.   Musculoskeletal:      Cervical back: Neck supple.   Skin:     General: Skin is warm and dry.   Neurological:      Mental Status: She is alert.      Coordination: Coordination normal.   Psychiatric:         Behavior: Behavior normal.         Last Results:    Telephone on 11/17/2021   Component Date Value Ref Range Status   • Vitamin B12 11/16/2021 435  211 - 911 pg/mL Final   • Folate 11/16/2021 14.4  >=5.5 ng/mL Final   Lab Services on 11/16/2021   Component Date Value Ref Range Status   • Sodium 11/16/2021 140  135 - 145 mmol/L Final   • Potassium 11/16/2021 4.2  3.4 - 5.1 mmol/L Final   • Chloride 11/16/2021 105  98 - 107 mmol/L Final   • Carbon Dioxide 11/16/2021 27  21 - 32 mmol/L Final   • Anion Gap 11/16/2021 12  10 - 20 mmol/L Final   • Glucose 11/16/2021 91  70 - 99 mg/dL Final   • BUN 11/16/2021 11  6 - 20 mg/dL Final   • Creatinine 11/16/2021 0.65  0.51 - 0.95 mg/dL Final   • Glomerular Filtration Rate 11/16/2021 >90  >=60 Final   • BUN/ Creatinine Ratio 11/16/2021 17  7 - 25 Final   • Calcium 11/16/2021 9.2  8.4 - 10.2 mg/dL Final   • Bilirubin, Total 11/16/2021 0.5  0.2 - 1.0 mg/dL Final   • GOT/AST 11/16/2021 16  <=37 Units/L Final   • GPT/ALT 11/16/2021 22  <64 Units/L Final   • Alkaline Phosphatase 11/16/2021 71  45 - 117 Units/L Final   • Albumin 11/16/2021 3.6  3.6 - 5.1 g/dL Final   • Protein, Total 11/16/2021 7.4  6.4 - 8.2 g/dL Final   • Globulin 11/16/2021 3.8  2.0 - 4.0 g/dL Final   • A/G Ratio 11/16/2021 0.9* 1.0 - 2.4 Final   • WBC 11/16/2021 7.2  4.2 - 11.0 K/mcL Final   • RBC 11/16/2021 4.00  4.00 - 5.20 mil/mcL Final   • HGB 11/16/2021 13.5  12.0 - 15.5 g/dL Final   • HCT 11/16/2021 40.5  36.0 - 46.5 % Final   • MCV 11/16/2021 101.3* 78.0 - 100.0 fl Final   • MCH 11/16/2021 33.8  26.0 - 34.0 pg Final   • MCHC 11/16/2021 33.3  32.0 - 36.5 g/dL Final   • RDW-CV 11/16/2021 12.0  11.0 - 15.0 % Final   • RDW-SD 11/16/2021 45.3  39.0 - 50.0 fL Final   • PLT  -CTA Head and Neck with intraparenchymal hemorrhage of cerebellum performed on 9/11. Showed no significant stenosis in the R and L carotid. Acute intraparenchymal hemorrhage in the cerebellum at the midline and right of midline measuring approximately 1.2 x 4.4 cm with surrounding mild vasogenic edema.  -Repeat Head CT on 9/12 showed no acute change from 9/11.   -NeuroSx assessed no surgical intervention.   -Maintain SBP<130/80  -check MRI/MRA per neuro  -hold aspirin and plavix for now.  -PT/OT 11/16/2021 176  140 - 450 K/mcL Final   • NRBC 11/16/2021 0  <=0 /100 WBC Final   • Neutrophil, Percent 11/16/2021 57  % Final   • Lymphocytes, Percent 11/16/2021 32  % Final   • Mono, Percent 11/16/2021 9  % Final   • Eosinophils, Percent 11/16/2021 1  % Final   • Basophils, Percent 11/16/2021 1  % Final   • Immature Granulocytes 11/16/2021 0  % Final   • Absolute Neutrophils 11/16/2021 4.1  1.8 - 7.7 K/mcL Final   • Absolute Lymphocytes 11/16/2021 2.3  1.0 - 4.0 K/mcL Final   • Absolute Monocytes 11/16/2021 0.7  0.3 - 0.9 K/mcL Final   • Absolute Eosinophils  11/16/2021 0.1  0.0 - 0.5 K/mcL Final   • Absolute Basophils 11/16/2021 0.0  0.0 - 0.3 K/mcL Final   • Absolute Immmature Granulocytes 11/16/2021 0.0  0.0 - 0.2 K/mcL Final   • TSH 11/16/2021 4.924  0.350 - 5.000 mcUnits/mL Final             ASSESSMENT/PLAN:  Dahiana was seen today for office visit.    Diagnoses and all orders for this visit:    Benign paroxysmal positional vertigo of right ear  Comments:  Increase meclizine to 4 times a day, Nasacort spray, may substitute with formulary, referral to Dr. Coleman, BJyrtec over-the-counter  Orders:  -     mometasone (NASONEX) 50 MCG/ACT nasal spray; 1-2 puffs each nostril as needed for allergy symptoms  -     SERVICE TO ENT    Chronic rhinosinusitis  Comments:  As per above  Orders:  -     mometasone (NASONEX) 50 MCG/ACT nasal spray; 1-2 puffs each nostril as needed for allergy symptoms  -     SERVICE TO ENT    Itching  Comments:  Advised patient to use Zyrtec, she also may use hydrocortisone cream over-the-counter, report if itching persists      As mentioned she is currently off work  To see me 10 to 14 days  Medical compliance with plan discussed.    Patient education completed on disease process    Reviewed and discussed medications  Patient expresses understanding of the plan.            Ame Brandon MD

## 2023-04-19 ENCOUNTER — FORM ENCOUNTER (OUTPATIENT)
Age: 88
End: 2023-04-19

## 2023-04-20 ENCOUNTER — EMERGENCY (EMERGENCY)
Facility: HOSPITAL | Age: 88
LOS: 1 days | Discharge: ANOTHER HOSPITAL | End: 2023-04-20
Attending: STUDENT IN AN ORGANIZED HEALTH CARE EDUCATION/TRAINING PROGRAM
Payer: MEDICARE

## 2023-04-20 ENCOUNTER — INPATIENT (INPATIENT)
Facility: HOSPITAL | Age: 88
LOS: 7 days | Discharge: NURSING, REHAB OR LONG-TERM CARE | DRG: 545 | End: 2023-04-28
Attending: PSYCHIATRY & NEUROLOGY | Admitting: NEUROLOGICAL SURGERY
Payer: MEDICARE

## 2023-04-20 VITALS
RESPIRATION RATE: 18 BRPM | OXYGEN SATURATION: 95 % | DIASTOLIC BLOOD PRESSURE: 72 MMHG | SYSTOLIC BLOOD PRESSURE: 162 MMHG | HEART RATE: 70 BPM

## 2023-04-20 VITALS
DIASTOLIC BLOOD PRESSURE: 76 MMHG | RESPIRATION RATE: 17 BRPM | HEART RATE: 68 BPM | TEMPERATURE: 98 F | WEIGHT: 95.9 LBS | SYSTOLIC BLOOD PRESSURE: 163 MMHG | HEIGHT: 65 IN | OXYGEN SATURATION: 95 %

## 2023-04-20 VITALS
DIASTOLIC BLOOD PRESSURE: 65 MMHG | TEMPERATURE: 100 F | SYSTOLIC BLOOD PRESSURE: 140 MMHG | WEIGHT: 121.25 LBS | OXYGEN SATURATION: 96 % | HEIGHT: 64.96 IN | RESPIRATION RATE: 16 BRPM | HEART RATE: 73 BPM

## 2023-04-20 LAB
A1C WITH ESTIMATED AVERAGE GLUCOSE RESULT: 5.1 % — SIGNIFICANT CHANGE UP (ref 4–5.6)
ALBUMIN SERPL ELPH-MCNC: 3.8 G/DL — SIGNIFICANT CHANGE UP (ref 3.5–5)
ALBUMIN SERPL ELPH-MCNC: 4.1 G/DL — SIGNIFICANT CHANGE UP (ref 3.3–5)
ALP SERPL-CCNC: 49 U/L — SIGNIFICANT CHANGE UP (ref 40–120)
ALP SERPL-CCNC: 52 U/L — SIGNIFICANT CHANGE UP (ref 40–120)
ALT FLD-CCNC: 20 U/L — SIGNIFICANT CHANGE UP (ref 10–45)
ALT FLD-CCNC: 29 U/L DA — SIGNIFICANT CHANGE UP (ref 10–60)
ANION GAP SERPL CALC-SCNC: 10 MMOL/L — SIGNIFICANT CHANGE UP (ref 5–17)
ANION GAP SERPL CALC-SCNC: 5 MMOL/L — SIGNIFICANT CHANGE UP (ref 5–17)
APPEARANCE UR: CLEAR — SIGNIFICANT CHANGE UP
APTT BLD: 27.3 SEC — LOW (ref 27.5–35.5)
APTT BLD: 27.9 SEC — SIGNIFICANT CHANGE UP (ref 27.5–35.5)
AST SERPL-CCNC: 46 U/L — HIGH (ref 10–40)
AST SERPL-CCNC: 51 U/L — HIGH (ref 10–40)
BACTERIA # UR AUTO: ABNORMAL /HPF
BASOPHILS # BLD AUTO: 0.02 K/UL — SIGNIFICANT CHANGE UP (ref 0–0.2)
BASOPHILS NFR BLD AUTO: 0.2 % — SIGNIFICANT CHANGE UP (ref 0–2)
BILIRUB SERPL-MCNC: 0.7 MG/DL — SIGNIFICANT CHANGE UP (ref 0.2–1.2)
BILIRUB SERPL-MCNC: 0.7 MG/DL — SIGNIFICANT CHANGE UP (ref 0.2–1.2)
BILIRUB UR-MCNC: NEGATIVE — SIGNIFICANT CHANGE UP
BLD GP AB SCN SERPL QL: NEGATIVE — SIGNIFICANT CHANGE UP
BUN SERPL-MCNC: 14 MG/DL — SIGNIFICANT CHANGE UP (ref 7–23)
BUN SERPL-MCNC: 17 MG/DL — SIGNIFICANT CHANGE UP (ref 7–18)
CALCIUM SERPL-MCNC: 8.1 MG/DL — LOW (ref 8.4–10.5)
CALCIUM SERPL-MCNC: 8.8 MG/DL — SIGNIFICANT CHANGE UP (ref 8.4–10.5)
CHLORIDE SERPL-SCNC: 101 MMOL/L — SIGNIFICANT CHANGE UP (ref 96–108)
CHLORIDE SERPL-SCNC: 105 MMOL/L — SIGNIFICANT CHANGE UP (ref 96–108)
CHOLEST SERPL-MCNC: 182 MG/DL — SIGNIFICANT CHANGE UP
CK MB CFR SERPL CALC: 10.9 NG/ML — HIGH (ref 0–6.7)
CK SERPL-CCNC: 1079 U/L — HIGH (ref 30–200)
CK SERPL-CCNC: 900 U/L — HIGH (ref 35–232)
CO2 SERPL-SCNC: 23 MMOL/L — SIGNIFICANT CHANGE UP (ref 22–31)
CO2 SERPL-SCNC: 25 MMOL/L — SIGNIFICANT CHANGE UP (ref 22–31)
COLOR SPEC: YELLOW — SIGNIFICANT CHANGE UP
CREAT SERPL-MCNC: 0.8 MG/DL — SIGNIFICANT CHANGE UP (ref 0.5–1.3)
CREAT SERPL-MCNC: 0.98 MG/DL — SIGNIFICANT CHANGE UP (ref 0.5–1.3)
DIFF PNL FLD: ABNORMAL
EGFR: 75 ML/MIN/1.73M2 — SIGNIFICANT CHANGE UP
EGFR: 86 ML/MIN/1.73M2 — SIGNIFICANT CHANGE UP
EOSINOPHIL # BLD AUTO: 0 K/UL — SIGNIFICANT CHANGE UP (ref 0–0.5)
EOSINOPHIL NFR BLD AUTO: 0 % — SIGNIFICANT CHANGE UP (ref 0–6)
EPI CELLS # UR: ABNORMAL /HPF
ESTIMATED AVERAGE GLUCOSE: 100 MG/DL — SIGNIFICANT CHANGE UP (ref 68–114)
GLUCOSE SERPL-MCNC: 122 MG/DL — HIGH (ref 70–99)
GLUCOSE SERPL-MCNC: 127 MG/DL — HIGH (ref 70–99)
GLUCOSE UR QL: 100 MG/DL
HCT VFR BLD CALC: 34.4 % — LOW (ref 39–50)
HCT VFR BLD CALC: 36.2 % — LOW (ref 39–50)
HDLC SERPL-MCNC: 77 MG/DL — SIGNIFICANT CHANGE UP
HGB BLD-MCNC: 11.7 G/DL — LOW (ref 13–17)
HGB BLD-MCNC: 12.1 G/DL — LOW (ref 13–17)
IMM GRANULOCYTES NFR BLD AUTO: 0.4 % — SIGNIFICANT CHANGE UP (ref 0–0.9)
INR BLD: 1.07 RATIO — SIGNIFICANT CHANGE UP (ref 0.88–1.16)
INR BLD: 1.07 — SIGNIFICANT CHANGE UP (ref 0.88–1.16)
KETONES UR-MCNC: NEGATIVE — SIGNIFICANT CHANGE UP
LACTATE SERPL-SCNC: 1.7 MMOL/L — SIGNIFICANT CHANGE UP (ref 0.7–2)
LEUKOCYTE ESTERASE UR-ACNC: NEGATIVE — SIGNIFICANT CHANGE UP
LIPID PNL WITH DIRECT LDL SERPL: 89 MG/DL — SIGNIFICANT CHANGE UP
LYMPHOCYTES # BLD AUTO: 0.97 K/UL — LOW (ref 1–3.3)
LYMPHOCYTES # BLD AUTO: 7.8 % — LOW (ref 13–44)
MAGNESIUM SERPL-MCNC: 2.1 MG/DL — SIGNIFICANT CHANGE UP (ref 1.6–2.6)
MAGNESIUM SERPL-MCNC: 2.4 MG/DL — SIGNIFICANT CHANGE UP (ref 1.6–2.6)
MCHC RBC-ENTMCNC: 32.5 PG — SIGNIFICANT CHANGE UP (ref 27–34)
MCHC RBC-ENTMCNC: 33 PG — SIGNIFICANT CHANGE UP (ref 27–34)
MCHC RBC-ENTMCNC: 33.4 GM/DL — SIGNIFICANT CHANGE UP (ref 32–36)
MCHC RBC-ENTMCNC: 34 GM/DL — SIGNIFICANT CHANGE UP (ref 32–36)
MCV RBC AUTO: 96.9 FL — SIGNIFICANT CHANGE UP (ref 80–100)
MCV RBC AUTO: 97.3 FL — SIGNIFICANT CHANGE UP (ref 80–100)
MONOCYTES # BLD AUTO: 0.89 K/UL — SIGNIFICANT CHANGE UP (ref 0–0.9)
MONOCYTES NFR BLD AUTO: 7.2 % — SIGNIFICANT CHANGE UP (ref 2–14)
NEUTROPHILS # BLD AUTO: 10.47 K/UL — HIGH (ref 1.8–7.4)
NEUTROPHILS NFR BLD AUTO: 84.4 % — HIGH (ref 43–77)
NITRITE UR-MCNC: NEGATIVE — SIGNIFICANT CHANGE UP
NON HDL CHOLESTEROL: 105 MG/DL — SIGNIFICANT CHANGE UP
NRBC # BLD: 0 /100 WBCS — SIGNIFICANT CHANGE UP (ref 0–0)
NRBC # BLD: 0 /100 WBCS — SIGNIFICANT CHANGE UP (ref 0–0)
PH UR: 6 — SIGNIFICANT CHANGE UP (ref 5–8)
PHOSPHATE SERPL-MCNC: 2.1 MG/DL — LOW (ref 2.5–4.5)
PHOSPHATE SERPL-MCNC: 2.4 MG/DL — LOW (ref 2.5–4.5)
PLATELET # BLD AUTO: 142 K/UL — LOW (ref 150–400)
PLATELET # BLD AUTO: 144 K/UL — LOW (ref 150–400)
POTASSIUM SERPL-MCNC: 3.8 MMOL/L — SIGNIFICANT CHANGE UP (ref 3.5–5.3)
POTASSIUM SERPL-MCNC: 4.4 MMOL/L — SIGNIFICANT CHANGE UP (ref 3.5–5.3)
POTASSIUM SERPL-SCNC: 3.8 MMOL/L — SIGNIFICANT CHANGE UP (ref 3.5–5.3)
POTASSIUM SERPL-SCNC: 4.4 MMOL/L — SIGNIFICANT CHANGE UP (ref 3.5–5.3)
PROT SERPL-MCNC: 6.8 G/DL — SIGNIFICANT CHANGE UP (ref 6–8.3)
PROT SERPL-MCNC: 7.7 G/DL — SIGNIFICANT CHANGE UP (ref 6–8.3)
PROT UR-MCNC: 30 MG/DL
PROTHROM AB SERPL-ACNC: 12.7 SEC — SIGNIFICANT CHANGE UP (ref 10.5–13.4)
PROTHROM AB SERPL-ACNC: 12.7 SEC — SIGNIFICANT CHANGE UP (ref 10.5–13.4)
RAPID RVP RESULT: SIGNIFICANT CHANGE UP
RBC # BLD: 3.55 M/UL — LOW (ref 4.2–5.8)
RBC # BLD: 3.72 M/UL — LOW (ref 4.2–5.8)
RBC # FLD: 12.3 % — SIGNIFICANT CHANGE UP (ref 10.3–14.5)
RBC # FLD: 12.4 % — SIGNIFICANT CHANGE UP (ref 10.3–14.5)
RBC CASTS # UR COMP ASSIST: ABNORMAL /HPF (ref 0–2)
RH IG SCN BLD-IMP: POSITIVE — SIGNIFICANT CHANGE UP
SARS-COV-2 RNA SPEC QL NAA+PROBE: SIGNIFICANT CHANGE UP
SODIUM SERPL-SCNC: 134 MMOL/L — LOW (ref 135–145)
SODIUM SERPL-SCNC: 135 MMOL/L — SIGNIFICANT CHANGE UP (ref 135–145)
SP GR SPEC: 1.01 — SIGNIFICANT CHANGE UP (ref 1.01–1.02)
TRIGL SERPL-MCNC: 79 MG/DL — SIGNIFICANT CHANGE UP
TROPONIN I, HIGH SENSITIVITY RESULT: 446.6 NG/L — HIGH
TROPONIN I, HIGH SENSITIVITY RESULT: 461.9 NG/L — HIGH
TROPONIN T SERPL-MCNC: 0.05 NG/ML — CRITICAL HIGH (ref 0–0.01)
TSH SERPL-MCNC: 1.76 UIU/ML — SIGNIFICANT CHANGE UP (ref 0.27–4.2)
UROBILINOGEN FLD QL: NEGATIVE — SIGNIFICANT CHANGE UP
WBC # BLD: 11.61 K/UL — HIGH (ref 3.8–10.5)
WBC # BLD: 12.4 K/UL — HIGH (ref 3.8–10.5)
WBC # FLD AUTO: 11.61 K/UL — HIGH (ref 3.8–10.5)
WBC # FLD AUTO: 12.4 K/UL — HIGH (ref 3.8–10.5)
WBC UR QL: SIGNIFICANT CHANGE UP /HPF (ref 0–5)

## 2023-04-20 PROCEDURE — 72125 CT NECK SPINE W/O DYE: CPT | Mod: 26,MA

## 2023-04-20 PROCEDURE — 93005 ELECTROCARDIOGRAM TRACING: CPT

## 2023-04-20 PROCEDURE — 87086 URINE CULTURE/COLONY COUNT: CPT

## 2023-04-20 PROCEDURE — 99285 EMERGENCY DEPT VISIT HI MDM: CPT | Mod: 25

## 2023-04-20 PROCEDURE — 82550 ASSAY OF CK (CPK): CPT

## 2023-04-20 PROCEDURE — 72125 CT NECK SPINE W/O DYE: CPT | Mod: MA

## 2023-04-20 PROCEDURE — 84484 ASSAY OF TROPONIN QUANT: CPT

## 2023-04-20 PROCEDURE — 0225U NFCT DS DNA&RNA 21 SARSCOV2: CPT

## 2023-04-20 PROCEDURE — 87040 BLOOD CULTURE FOR BACTERIA: CPT

## 2023-04-20 PROCEDURE — 84100 ASSAY OF PHOSPHORUS: CPT

## 2023-04-20 PROCEDURE — 99291 CRITICAL CARE FIRST HOUR: CPT

## 2023-04-20 PROCEDURE — 85610 PROTHROMBIN TIME: CPT

## 2023-04-20 PROCEDURE — 74177 CT ABD & PELVIS W/CONTRAST: CPT | Mod: MA

## 2023-04-20 PROCEDURE — 70450 CT HEAD/BRAIN W/O DYE: CPT | Mod: 26,MA

## 2023-04-20 PROCEDURE — 93010 ELECTROCARDIOGRAM REPORT: CPT

## 2023-04-20 PROCEDURE — 81001 URINALYSIS AUTO W/SCOPE: CPT

## 2023-04-20 PROCEDURE — 83605 ASSAY OF LACTIC ACID: CPT

## 2023-04-20 PROCEDURE — 82962 GLUCOSE BLOOD TEST: CPT

## 2023-04-20 PROCEDURE — 36415 COLL VENOUS BLD VENIPUNCTURE: CPT

## 2023-04-20 PROCEDURE — 71275 CT ANGIOGRAPHY CHEST: CPT | Mod: 26,MA

## 2023-04-20 PROCEDURE — 83735 ASSAY OF MAGNESIUM: CPT

## 2023-04-20 PROCEDURE — 70450 CT HEAD/BRAIN W/O DYE: CPT | Mod: MA

## 2023-04-20 PROCEDURE — 73590 X-RAY EXAM OF LOWER LEG: CPT | Mod: 26,50

## 2023-04-20 PROCEDURE — 71275 CT ANGIOGRAPHY CHEST: CPT | Mod: MA

## 2023-04-20 PROCEDURE — 74177 CT ABD & PELVIS W/CONTRAST: CPT | Mod: 26,MA

## 2023-04-20 PROCEDURE — 96374 THER/PROPH/DIAG INJ IV PUSH: CPT | Mod: XU

## 2023-04-20 PROCEDURE — 99285 EMERGENCY DEPT VISIT HI MDM: CPT

## 2023-04-20 PROCEDURE — 85730 THROMBOPLASTIN TIME PARTIAL: CPT

## 2023-04-20 PROCEDURE — 80053 COMPREHEN METABOLIC PANEL: CPT

## 2023-04-20 PROCEDURE — 85025 COMPLETE CBC W/AUTO DIFF WBC: CPT

## 2023-04-20 RX ORDER — LEVETIRACETAM 250 MG/1
500 TABLET, FILM COATED ORAL ONCE
Refills: 0 | Status: COMPLETED | OUTPATIENT
Start: 2023-04-20 | End: 2023-04-20

## 2023-04-20 RX ORDER — GLUCAGON INJECTION, SOLUTION 0.5 MG/.1ML
1 INJECTION, SOLUTION SUBCUTANEOUS ONCE
Refills: 0 | Status: DISCONTINUED | OUTPATIENT
Start: 2023-04-20 | End: 2023-04-21

## 2023-04-20 RX ORDER — NICARDIPINE HYDROCHLORIDE 30 MG/1
5 CAPSULE, EXTENDED RELEASE ORAL
Qty: 40 | Refills: 0 | Status: DISCONTINUED | OUTPATIENT
Start: 2023-04-20 | End: 2023-04-24

## 2023-04-20 RX ORDER — SODIUM CHLORIDE 9 MG/ML
1000 INJECTION INTRAMUSCULAR; INTRAVENOUS; SUBCUTANEOUS
Refills: 0 | Status: DISCONTINUED | OUTPATIENT
Start: 2023-04-20 | End: 2023-04-20

## 2023-04-20 RX ORDER — SENNA PLUS 8.6 MG/1
2 TABLET ORAL AT BEDTIME
Refills: 0 | Status: DISCONTINUED | OUTPATIENT
Start: 2023-04-20 | End: 2023-04-28

## 2023-04-20 RX ORDER — SODIUM CHLORIDE 9 MG/ML
500 INJECTION INTRAMUSCULAR; INTRAVENOUS; SUBCUTANEOUS ONCE
Refills: 0 | Status: COMPLETED | OUTPATIENT
Start: 2023-04-20 | End: 2023-04-20

## 2023-04-20 RX ORDER — DEXTROSE 50 % IN WATER 50 %
12.5 SYRINGE (ML) INTRAVENOUS ONCE
Refills: 0 | Status: DISCONTINUED | OUTPATIENT
Start: 2023-04-20 | End: 2023-04-21

## 2023-04-20 RX ORDER — SODIUM CHLORIDE 5 G/100ML
150 INJECTION, SOLUTION INTRAVENOUS ONCE
Refills: 0 | Status: COMPLETED | OUTPATIENT
Start: 2023-04-20 | End: 2023-04-20

## 2023-04-20 RX ORDER — SODIUM CHLORIDE 9 MG/ML
1000 INJECTION, SOLUTION INTRAVENOUS
Refills: 0 | Status: DISCONTINUED | OUTPATIENT
Start: 2023-04-20 | End: 2023-04-21

## 2023-04-20 RX ORDER — LEVETIRACETAM 250 MG/1
500 TABLET, FILM COATED ORAL EVERY 12 HOURS
Refills: 0 | Status: DISCONTINUED | OUTPATIENT
Start: 2023-04-20 | End: 2023-04-27

## 2023-04-20 RX ORDER — ACETAMINOPHEN 500 MG
1000 TABLET ORAL ONCE
Refills: 0 | Status: COMPLETED | OUTPATIENT
Start: 2023-04-20 | End: 2023-04-20

## 2023-04-20 RX ORDER — SODIUM CHLORIDE 5 G/100ML
150 INJECTION, SOLUTION INTRAVENOUS ONCE
Refills: 0 | Status: DISCONTINUED | OUTPATIENT
Start: 2023-04-20 | End: 2023-04-20

## 2023-04-20 RX ORDER — ACETAMINOPHEN 500 MG
650 TABLET ORAL EVERY 6 HOURS
Refills: 0 | Status: DISCONTINUED | OUTPATIENT
Start: 2023-04-20 | End: 2023-04-23

## 2023-04-20 RX ORDER — DEXTROSE 50 % IN WATER 50 %
25 SYRINGE (ML) INTRAVENOUS ONCE
Refills: 0 | Status: DISCONTINUED | OUTPATIENT
Start: 2023-04-20 | End: 2023-04-21

## 2023-04-20 RX ORDER — SODIUM CHLORIDE 9 MG/ML
1000 INJECTION INTRAMUSCULAR; INTRAVENOUS; SUBCUTANEOUS
Refills: 0 | Status: DISCONTINUED | OUTPATIENT
Start: 2023-04-20 | End: 2023-04-21

## 2023-04-20 RX ORDER — POTASSIUM PHOSPHATE, MONOBASIC POTASSIUM PHOSPHATE, DIBASIC 236; 224 MG/ML; MG/ML
15 INJECTION, SOLUTION INTRAVENOUS ONCE
Refills: 0 | Status: COMPLETED | OUTPATIENT
Start: 2023-04-20 | End: 2023-04-21

## 2023-04-20 RX ORDER — CHLORHEXIDINE GLUCONATE 213 G/1000ML
1 SOLUTION TOPICAL
Refills: 0 | Status: DISCONTINUED | OUTPATIENT
Start: 2023-04-20 | End: 2023-04-24

## 2023-04-20 RX ORDER — DEXTROSE 50 % IN WATER 50 %
15 SYRINGE (ML) INTRAVENOUS ONCE
Refills: 0 | Status: DISCONTINUED | OUTPATIENT
Start: 2023-04-20 | End: 2023-04-21

## 2023-04-20 RX ORDER — POLYETHYLENE GLYCOL 3350 17 G/17G
17 POWDER, FOR SOLUTION ORAL DAILY
Refills: 0 | Status: DISCONTINUED | OUTPATIENT
Start: 2023-04-20 | End: 2023-04-28

## 2023-04-20 RX ADMIN — LEVETIRACETAM 420 MILLIGRAM(S): 250 TABLET, FILM COATED ORAL at 20:34

## 2023-04-20 RX ADMIN — NICARDIPINE HYDROCHLORIDE 25 MG/HR: 30 CAPSULE, EXTENDED RELEASE ORAL at 20:35

## 2023-04-20 RX ADMIN — SODIUM CHLORIDE 100 MILLILITER(S): 9 INJECTION INTRAMUSCULAR; INTRAVENOUS; SUBCUTANEOUS at 23:22

## 2023-04-20 RX ADMIN — SODIUM CHLORIDE 300 MILLILITER(S): 5 INJECTION, SOLUTION INTRAVENOUS at 23:23

## 2023-04-20 RX ADMIN — SODIUM CHLORIDE 1000 MILLILITER(S): 9 INJECTION INTRAMUSCULAR; INTRAVENOUS; SUBCUTANEOUS at 18:18

## 2023-04-20 RX ADMIN — LEVETIRACETAM 500 MILLIGRAM(S): 250 TABLET, FILM COATED ORAL at 20:44

## 2023-04-20 RX ADMIN — Medication 400 MILLIGRAM(S): at 23:18

## 2023-04-20 NOTE — ED PROVIDER NOTE - PROGRESS NOTE DETAILS
Lucks-DO: CT head showing parenchymal hemorrhage, troponin elevated, no STEMI on EKG. Discussed with Dr. Martinez ([facility] at [facility]) via transfer center, agreeable with transfer, states not to give platelets/ddavp (last plavix yesterday morning per daughters). Written consent for transfer obtained from daughters.

## 2023-04-20 NOTE — ED ADULT TRIAGE NOTE - CHIEF COMPLAINT QUOTE
Lethargic, fell OOB yesterday and today, incontinent   Last time seen normal was last evening at 2200

## 2023-04-20 NOTE — PROGRESS NOTE ADULT - SUBJECTIVE AND OBJECTIVE BOX
HPI: Patient is an 86 y/o male with PMH of dementia- oriented x 2 at baseline, HTN, prior CVA- on plavix. Last taken on 4/19.   He presents in the setting of 2 falls.     On admission, the patient was:  GCS: 13  Dickens-Price:  modified Muñoz:  ICH score:  NIHSS:    *** HIGH RISK OF DVT PRESENT ON ADMISSION ***    ICU Vital Signs Last 24 Hrs  T(C): 36.6 (20 Apr 2023 19:13), Max: 36.7 (20 Apr 2023 16:31)  T(F): 97.8 (20 Apr 2023 19:13), Max: 98.1 (20 Apr 2023 16:31)  HR: 70 (20 Apr 2023 20:02) (68 - 73)  BP: 162/72 (20 Apr 2023 20:02) (162/72 - 167/74)  RR: 18 (20 Apr 2023 20:02) (17 - 18)  SpO2: 95% (20 Apr 2023 20:02) (95% - 96%)      EXAMINATION:  General: Calm  HEENT:  MMM  Neuro:   Cards:  RRR  Respiratory: Clear, no wheeze  Abdomen: Soft, non-tender  Extremities: No edema  Skin: Warm/dry        LABS:                         12.1   12.40 )-----------( 144      ( 20 Apr 2023 18:00 )             36.2     04-20    135  |  105  |  17  ----------------------------<  127<H>  4.4   |  25  |  0.98    Ca    8.8      20 Apr 2023 18:00  Phos  2.4     04-20  Mg     2.4     04-20    TPro  7.7  /  Alb  3.8  /  TBili  0.7  /  DBili  x   /  AST  51<H>  /  ALT  29  /  AlkPhos  49  04-20    LIVER FUNCTIONS - ( 20 Apr 2023 18:00 )  Alb: 3.8 g/dL / Pro: 7.7 g/dL / ALK PHOS: 49 U/L / ALT: 29 U/L DA / AST: 51 U/L / GGT: x                      ==============================  NSICU ATTENDING PROGRESS NOTE  ==============================    HPI: Patient is an 86 y/o male with PMH of dementia (oriented x 2 at baseline, typically ambulates with cane or walker), HTN, prior CVA- on plavix. Last taken on 4/19. BPH. He presents with altered mental status in the setting of 2 falls.   Per ED records, patient's daughters stated that he had recent insomnia. Subsequently, trazodone increased from 50 mg to 100 mg before bedtime 2 days ago.   Pt had 2 falls today, both unwitnessed, unknown downtime. Daughter states patient had urinary incontinence.   He presented to [facility] ED by EMS. 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 He was started on Cardene and given Keppra. Last dose of Plavix was 4/19 and with possible NSTEMI, plavix not reversed.   Also had trauma imaging including CT Cspine/ chest/abd/pelvis.  Transferred to [facility] for further management.     On admission, the patient was:  GCS: 13  Dickens-Price:  modified Muñoz:  ICH score: 1  NIHSS:    *** HIGH RISK OF DVT PRESENT ON ADMISSION ***      ICU Vital Signs Last 24 Hrs  T(C): 37.6 (20 Apr 2023 21:18), Max: 37.6 (20 Apr 2023 21:18)  T(F): 99.7 (20 Apr 2023 21:18), Max: 99.7 (20 Apr 2023 21:18)  HR: 65 (20 Apr 2023 22:00) (65 - 73)  BP: 131/60 (20 Apr 2023 22:00) (131/60 - 167/74)  BP(mean): 86 (20 Apr 2023 22:00) (86 - 94)  RR: 16 (20 Apr 2023 21:18) (16 - 18)  SpO2: 96% (20 Apr 2023 22:00) (95% - 96%)      EXAMINATION: GCS: 13, Cantonese speaking.   General: Normocephalic, atraumatic, laying in bed, thin frail elderly, Appears to be grimacing and massaging bilateral legs  HEENT:  MMM  Mental status: Oriented x 1 (person only)  CN: Pupils 3mm and reactive, keeps eyes closed during interview but will open to commands, no ptosis bilaterally, sensation intact to crude touch V1-V3, face symmetric, hearing grossly intact.  Power 5/5 in bilateral upper and lower proximal extremities. Moves all extremities antigravity.  Cards:  S1/S2, RRR  Respiratory: Clear, no wheeze  Abdomen: Soft, non-tender  Extremities: No edema  Skin: Warm/dry      MEDICATIONS:   acetaminophen     Tablet .. 650 milliGRAM(s) Oral every 6 hours PRN  polyethylene glycol 3350 17 Gram(s) Oral daily  senna 2 Tablet(s) Oral at bedtime    LABS:                       11.7   11.61 )-----------( 142      ( 20 Apr 2023 21:57 )             34.4     04-20    135  |  105  |  17  ----------------------------<  127<H>  4.4   |  25  |  0.98    Ca    8.8      20 Apr 2023 18:00  Phos  2.4     04-20  Mg     2.4     04-20    TPro  7.7  /  Alb  3.8  /  TBili  0.7  /  DBili  x   /  AST  51<H>  /  ALT  29  /  AlkPhos  49  04-20    LIVER FUNCTIONS - ( 20 Apr 2023 18:00 )  Alb: 3.8 g/dL / Pro: 7.7 g/dL / ALK PHOS: 49 U/L / ALT: 29 U/L DA / AST: 51 U/L / GGT: x                  ==============================  NSICU ATTENDING PROGRESS NOTE  ==============================    HPI: Patient is an 86 y/o male with PMH of dementia (oriented x 2 at baseline, typically ambulates with cane or walker), HTN, prior CVA- on plavix. Last taken on 4/19. BPH. He presents with altered mental status in the setting of 2 falls.   From prior imaging at OSH 9/2021, pt found to have midline cerebellar bleed on CT and old areas of micro hemorrhages on MRI suspicious for amyloid angiopathy.  Per ED records, patient's daughters stated that he had recent insomnia. Subsequently, trazodone increased from 50mg to 100 mg before bedtime 2 days ago.   Pt had 2 falls on day of admission, both unwitnessed, unknown downtime. Daughter states patient had urinary incontinence.   He presented to [facility] ED by EMS. 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 He was started on Cardene and given Keppra. Last dose of Plavix was 4/19.  Also had trauma imaging including CT Cspine/ chest/abd/pelvis.  Transferred to [facility] for further management.     On admission, the patient was:  GCS: 13  Dickens-Price:  modified Muñoz:  ICH score: 1  NIHSS:    *** HIGH RISK OF DVT PRESENT ON ADMISSION ***      ICU Vital Signs Last 24 Hrs  T(C): 37.6 (20 Apr 2023 21:18), Max: 37.6 (20 Apr 2023 21:18)  T(F): 99.7 (20 Apr 2023 21:18), Max: 99.7 (20 Apr 2023 21:18)  HR: 65 (20 Apr 2023 22:00) (65 - 73)  BP: 131/60 (20 Apr 2023 22:00) (131/60 - 167/74)  BP(mean): 86 (20 Apr 2023 22:00) (86 - 94)  RR: 16 (20 Apr 2023 21:18) (16 - 18)  SpO2: 96% (20 Apr 2023 22:00) (95% - 96%)      EXAMINATION: GCS: 13, Cantonese speaking.   General: Normocephalic, atraumatic, laying in bed, thin frail elderly, Appears to be grimacing and massaging bilateral legs  HEENT:  MMM  Mental status: Oriented x 1 (person only)  CN: Pupils 3mm and reactive, keeps eyes closed during interview but will open to commands, no ptosis bilaterally, sensation intact to crude touch V1-V3, face symmetric, hearing grossly intact.  Power 5/5 in bilateral upper and lower proximal extremities. Moves all extremities antigravity.  Cards:  S1/S2, RRR  Respiratory: Clear, no wheeze  Abdomen: Soft, non-tender  Extremities: No edema  Skin: Warm/dry      MEDICATIONS:   acetaminophen     Tablet .. 650 milliGRAM(s) Oral every 6 hours PRN  polyethylene glycol 3350 17 Gram(s) Oral daily  senna 2 Tablet(s) Oral at bedtime    LABS:                       11.7   11.61 )-----------( 142      ( 20 Apr 2023 21:57 )             34.4     04-20    135  |  105  |  17  ----------------------------<  127<H>  4.4   |  25  |  0.98    Ca    8.8      20 Apr 2023 18:00  Phos  2.4     04-20  Mg     2.4     04-20    TPro  7.7  /  Alb  3.8  /  TBili  0.7  /  DBili  x   /  AST  51<H>  /  ALT  29  /  AlkPhos  49  04-20    LIVER FUNCTIONS - ( 20 Apr 2023 18:00 )  Alb: 3.8 g/dL / Pro: 7.7 g/dL / ALK PHOS: 49 U/L / ALT: 29 U/L DA / AST: 51 U/L / GGT: x               MRI 9/2021  INTERPRETATION: Clinical indication: Hypertensive emergency. Cerebellar hemorrhage.  MRI of the brain was performed using sagittal T1, axial T1 and T2 T2 FLAIR diffusion and susceptibility weighted sequence. This exam is somewhat limited by lack of IV contrast.    Extensive parenchymal volume loss and chronic microvascular ischemic changes are identified. Abnormal signal with associated stability seen involving the cerebellar midline patella with extension into the right cerebellar hemisphere. This is compatible with areas of acute parenchymal hemorrhages. There is evidence of a hematocrit level seen associated with the midline hemorrhage. The midline hemorrhage measures approximately 1.9 x 5.0 cm. Contrast enhanced MRI can be done to ensure that there is no abnormal enhancing areas of enhancement which can be seen with associated neoplastic lesions. Localized mass effect is seen. No significant shift or herniation seen.  Prominent area of abnormal signal is seen involving the left cerebellar hemisphere which is likely compatible area of old hemorrhage.    Subcentimeter foci of susceptibility are seen throughout the posterior fossa and region which are compatible with small areas of old hemorrhage (possibly secondary to amyloid angiopathy) mineralization or small cavernous angiomas.  The large vessels demonstrates normal flow voids  Findings visualized paranasal sinuses appear clear.  Mild inflammatory changes are seen involving both mastoids right greater than left.    IMPRESSION: Parenchymal hemorrhage as described above. Contrast-enhanced MRI of the brain can be done to better evaluate this finding.

## 2023-04-20 NOTE — H&P ADULT - ASSESSMENT
Assessment: 86 y/o Catonese speaking male with PMH of dementia (oriented x 2 at baseline, typically ambulates with cane or walker), HTN, prior CVA- on plavix, (Last taken on 4/19) BPH presented to ED at [facility] with altered mental status in the setting of 2 unwitnessed falls. Per ED records, patient's daughters stated that he had recent insomnia. Subsequently, trazodone increased from 50 mg to 100 mg before bedtime 2 days ago. Pt had 2 falls today, both unwitnessed, unknown downtime. Daughter states patient had urinary incontinence. He presented to [facility] ED by EMS. 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 He was started on Cardene and given Keppra. Last dose of Plavix was 4/19 and with possible NSTEMI, plavix not reversed. Currently denies chest pain, palpitations shortness of breath. Also had trauma imaging including CT Cspine/ chest/abd/pelvis. Transferred to [facility] for further management.  Assessment: 86 y/o Catonese speaking male with PMH of dementia (oriented x 2 at baseline, typically ambulates with cane or walker), HTN, prior CVA- on plavix, (Last taken on 4/19) BPH presented to ED at [place] with altered mental status in the setting of 2 unwitnessed falls with unknown downtime. Daughter states patient had urinary incontinence. 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     Plan:  Neuro  -neuro/vitals q1  -pain control with IV Tylenol  -Keppra 500 BID  -EEG  -pending stability CTH and CTA head and neck  -delirium precautions  -h/o stroke: home plavix on hold  -PT/OT pending    Cario  --160  -h/o HTN, cardene gtt prn  -EKG pending  -echo w/ bubble pending  -elevated troponin, r/o NSTEMI  -cardiology consult    Pulm  -RA  -CT PE protocol at OSH neg      GI  -NPO  -bowel regimen  -CT ABD Pelvis at OSH: Cholelithiasis with focal gallbladder wall thickening  -ABD US pending    Renal  -NS @ 100, s/p 150cc 3% bolus  -h/o BPH  -f/u CPK c/f rhabo  -voiding    Endo  -ISS  -f/u A1c, lipid panel, TSH    Heme  -SCDs for DVT ppx  -LE dopplers pending    ID  -afebrile    Discussed with Dr. D'Amico and Dr. Franklin

## 2023-04-20 NOTE — ED ADULT NURSE REASSESSMENT NOTE - NS ED NURSE REASSESS COMMENT FT1
Pt remains stable, lethargic, responsive to touch, no s/s of any distress noted. Daughters at bed side. IV line in place, IV fluids infusing as per orders, no signs of infiltration noted. VS WNL. Call bell in reach, bed in lowest position. Safety maintained, hourly rounding performed. Endorsed to nurse Triana.

## 2023-04-20 NOTE — ED PROVIDER NOTE - PHYSICAL EXAMINATION
CONSTITUTIONAL: non-toxic, lethargic appearing  SKIN: no rash, no petechiae.  EYES: PERRL, pink conjunctiva, anicteric  ENT: tongue and uvular midline, no exudates, dry mucous membranes  NECK: Supple; no meningismus, no JVD  CARD: RRR, no murmurs, equal radial pulses bilaterally 2+  RESP: CTAB, no respiratory distress  ABD: Soft, non-tender, non-distended, no peritoneal signs, no CVA tenderness  EXT: Normal ROM x4. No edema.   NEURO: Alert, responsive to verbal and painful stimuli, moving all extremities.

## 2023-04-20 NOTE — H&P ADULT - NSHPPHYSICALEXAM_GEN_ALL_CORE
GCS: 13, Cantonese speaking.   General: Normocephalic, atraumatic, laying in bed, thin frail elderly, Appears to be grimacing and massaging bilateral legs  HEENT:  MMM  Neuro: Mental status: Oriented x 1 (person only)  CN: Pupils 3mm and reactive, keeps eyes closed during interview but will open to commands, no ptosis bilaterally, sensation intact to crude touch V1-V3, face symmetric, hearing grossly intact.  Power 5/5 in bilateral upper and lower proximal extremities. Moves all extremities antigravity.  Cards:  S1/S2, RRR  Respiratory: Clear, no wheeze  Abdomen: Soft, non-tender  : deferred  Extremities: No edema  Skin: Warm/dry

## 2023-04-20 NOTE — H&P ADULT - NSHPLABSRESULTS_GEN_ALL_CORE
< from: CT Head No Cont (04.20.23 @ 19:37) >    IMPRESSION:    1.  CT HEAD:    Right frontal acute parenchymal hemorrhage    2.  CT CERVICAL:   No cervical vertebral fracture.      CRITICAL VALUE:    I discussed this case with Dr. Shultz  at  4/20/2023   7:40 PM.  Hospital policies for critical values including read back   policy were followed.  The verbal communication of the critical value   supplements this written report.    --- End of Report ---    < end of copied text >    < from: CT Angio Chest PE Protocol w/ IV Cont (04.20.23 @ 19:45) >    IMPRESSION: No evidence of acute traumatic injury.  Age indeterminate compression fractures of T5 and L5.  Cholelithiasis with focal gallbladder wall thickening. Recommend   nonemergent gallbladder ultrasound  Markedly enlarged prostate gland    --- End of Report ---    < end of copied text >    < from: CT Cervical Spine No Cont (04.20.23 @ 19:46) >    IMPRESSION:    1.  CT HEAD:    Right frontal acute parenchymal hemorrhage    2.  CT CERVICAL:   No cervical vertebral fracture.      CRITICAL VALUE:    I discussed this case with Dr. Shultz  at  4/20/2023   7:40 PM.  Hospital policies for critical values including read back   policy were followed.  The verbal communication of the critical value   supplements this written report.    --- End of Report ---    < end of copied text >      < from: CT Abdomen and Pelvis w/ IV Cont (04.20.23 @ 19:46) >    IMPRESSION: No evidence of acute traumatic injury.  Age indeterminate compression fractures of T5 and L5.  Cholelithiasis with focal gallbladder wall thickening. Recommend   nonemergent gallbladder ultrasound  Markedly enlarged prostate gland    --- End of Report ---    < end of copied text >

## 2023-04-20 NOTE — ED PROVIDER NOTE - OBJECTIVE STATEMENT
History limited from patient secondary to altered mental status, history obtained from daughters at bedside and chart review.    87-year-old male with past medical history hypertension, CVA on Plavix, BPH, dementia (baseline A&O x2 per daughter's) presents with altered mental status since yesterday.  Per daughters, patient with history of insomnia, recently had trazodone increased from 50 mg before bedtime to 100 mg before bedtime 2 days ago.  Per daughters, patient's had 2 falls today, unwitnessed, unknown downtime.  Daughter states patient had urinary incontinence.  Per daughters, patient unable to ambulate today, typically ambulates with cane or walker at baseline.  Denies any difficulty breathing, vomiting, bloody stools, black tarry stools, or rash.

## 2023-04-20 NOTE — H&P ADULT - NSICDXPASTMEDICALHX_GEN_ALL_CORE_FT
PAST MEDICAL HISTORY:  BPH (benign prostatic hyperplasia)     Hypertension     Irregular heart rate     Lumbar disc herniation     Stroke

## 2023-04-20 NOTE — PROGRESS NOTE ADULT - ASSESSMENT
ASSESSMENT/PLAN:    NEURO:  Q1 neurochecks  Activity: [] mobilize as tolerated [] Bedrest [] PT [] OT [] PMNR    PULM:    CV:  SBP goal    RENAL:  Fluids:    GI:  Diet:  GI prophylaxis [] not indicated [] PPI [] other:  Bowel regimen [] colace [] senna [] other:    ENDO:   Goal euglycemia (-180)    HEME/ONC:  VTE prophylaxis: [] SCDs [] chemoprophylaxis [] hold chemoprophylaxis due to: [] high risk of DVT/PE on admission due to:    ID:    MISC:    SOCIAL/FAMILY:  [] awaiting [] updated at bedside [] family meeting    CODE STATUS:  [] Full Code [] DNR [] DNI [] Palliative/Comfort Care    DISPOSITION:  [] ICU [] Stroke Unit [] Floor [] EMU [] RCU [] PCU    [] Patient is at high risk of neurologic deterioration/death due to:     Time spent: ___ [] critical care minutes     ASSESSMENT/PLAN: 88 y/o male with R frontal IPH- likely traumatic  ICH    NEURO:  Q1 neurochecks  AEDs: Keppra 500mg bid. EEG.   Stability CT scan pending with CTA.    No surgical intervention warranted at this time  Delirium precautions  Stroke core measures  Activity: [] mobilize as tolerated [] Bedrest [x] PT [x] OT [] PMNR    PULM:  Room air  CT PE- negative for PE    CV: Elevated troponin. Rule out NSTEMI.  SBP goal 100-160. Cardene prn  EKG, trop, TTE.   On Plavix. Hold antiplts/AC for now with ICH.   Cardiology consult pending above w/u. Of note pt denies chest pain/ SOB    RENAL: BPH, mild rhabdomyolysis  Markedly enlarged prostate gland on imaging  Fluids: NS at 75ml/hr  Monitor Is/Os  Monitor CPK  Lactate wnl    GI:  Diet: NPO for now. Speech/ swallow eval in am once stable   Cholelithiasis with focal gallbladder wall thickening. Will need gallbladder ultrasound  GI prophylaxis [x] not indicated [] PPI [] other:  Bowel regimen [] colace [x] senna [] other: miralax  Monitor LFTs    ENDO:   Goal euglycemia (-180)  Check A1c, lipid, TSH  ISS, monitor fingersticks    HEME/ONC: On plavix. Not reversed since possible active NSTEMI and last dose ~24hrs, anemia  P2Y12  VTE prophylaxis: [x] SCDs  [] chemoprophylaxis [x] hold chemoprophylaxis due to bleed  LE dopplers screening as found down  Monitor H/H    ID:Mild leukocytosis  Afebrile. Monitor     MISC:    MSK: Age indeterminate compression fractures of T5 and L5.  Rule out tib/fib fractures as pt suggesting pain in legs- more strongly suspect 2/2 rhabdomyolysis  MED REC    SOCIAL/FAMILY:  [x] awaiting [] updated at bedside [] family meeting    CODE STATUS:  [x] Full Code [] DNR [] DNI [] Palliative/Comfort Care    DISPOSITION:  [x] ICU [] Stroke Unit [] Floor [] EMU [] RCU [] PCU    Time spent: 60 critical care minutes     ASSESSMENT/PLAN: 86 y/o male with R frontal IPH- ? traumatic vs 2/2 CAA  ICH score 1    NEURO:  Q1 neurochecks  AEDs: Keppra 500mg bid. EEG.   Stability CT scan pending with CTA.    Consider MRI w and w/o  No surgical intervention warranted at this time  Delirium precautions  Stroke core measures  Activity: [] mobilize as tolerated [] Bedrest [x] PT [x] OT [] PMNR    PULM:  Room air  CT PE study at OSH- negative for PE    CV: Elevated troponin. Rule out NSTEMI.  SBP goal 100-160. Cardene prn  EKG, trop, TTE.   On Plavix. Hold antiplts/AC for now with ICH.   Cardiology consult pending above w/u. Of note pt denies chest pain/ SOB    RENAL: BPH,  rhabdomyolysis  Markedly enlarged prostate gland on imaging  Fluids: NS at 75ml/hr  Monitor Is/Os  Monitor CPK  Lactate wnl    GI:  Diet: NPO for now. Speech/ swallow eval in am once stable   Cholelithiasis with focal gallbladder wall thickening. Will need gallbladder ultrasound  GI prophylaxis [x] not indicated [] PPI [] other:  Bowel regimen [] colace [x] senna [] other: miralax  Monitor LFTs    ENDO:   Goal euglycemia (-180)  Check A1c, lipid, TSH  ISS, monitor fingersticks    HEME/ONC: On plavix. Not reversed since possible active NSTEMI and last dose ~24hrs, anemia  P2Y12  VTE prophylaxis: [x] SCDs  [] chemoprophylaxis [x] hold chemoprophylaxis due to bleed  LE dopplers screening as found down  Monitor H/H    ID:Mild leukocytosis  Afebrile. Monitor     MISC:    MSK: Age indeterminate compression fractures of T5 and L5.  Rule out tib/fib fractures as pt suggesting pain in legs- more strongly suspect 2/2 rhabdomyolysis  MED REC pending    SOCIAL/FAMILY:  [x] awaiting [] updated at bedside [] family meeting    CODE STATUS:  [x] Full Code [] DNR [] DNI [] Palliative/Comfort Care    DISPOSITION:  [x] ICU [] Stroke Unit [] Floor [] EMU [] RCU [] PCU    Time spent: 60 critical care minutes

## 2023-04-20 NOTE — ED PROVIDER NOTE - CARE PLAN
Principal Discharge DX:	Cerebral parenchymal hemorrhage  Secondary Diagnosis:	NSTEMI (non-ST elevation myocardial infarction)   1

## 2023-04-20 NOTE — ED ADULT NURSE NOTE - OBJECTIVE STATEMENT
Pt received lethargic, responsive to touch. h/o CVA. Daughters at bed side report pt fell OOB last night and again this morning, increased lethargy since this morning, increased forgetfulness/confusion x 2 weeks. EKG done, pt placed on cardiac monitor, no signs of distress noted.

## 2023-04-20 NOTE — ED PROVIDER NOTE - CLINICAL SUMMARY MEDICAL DECISION MAKING FREE TEXT BOX
[place]: 87-year-old male with past medical history hypertension, CVA on Plavix, BPH, dementia (baseline A&O x2 per daughter's) presents with altered mental status since yesterday.  Per daughters, patient with history of insomnia, recently had trazodone increased from 50 mg before bedtime to 100 mg before bedtime 2 days ago.  Per daughters, patient's had 2 falls today, unwitnessed, unknown downtime.  Daughter states patient had urinary incontinence.  Per daughters, patient unable to ambulate today, typically ambulates with cane or walker at baseline.  Denies any difficulty breathing, vomiting, bloody stools, black tarry stools, or rash. Unclear etiology, likely infectious vs. metabolic vs. iatrogenic (recent med change) vs. ICH. Will obtain labs, imaging, provide supportive treatment, anticipate likely admission.
Not applicable

## 2023-04-20 NOTE — PATIENT PROFILE ADULT - FALL HARM RISK - HARM RISK INTERVENTIONS
Assistance with ambulation/Assistance OOB with selected safe patient handling equipment/Communicate Risk of Fall with Harm to all staff/Discuss with provider need for PT consult/Monitor gait and stability/Reinforce activity limits and safety measures with patient and family/Tailored Fall Risk Interventions/Visual Cue: Yellow wristband and red socks/Bed in lowest position, wheels locked, appropriate side rails in place/Call bell, personal items and telephone in reach/Instruct patient to call for assistance before getting out of bed or chair/Non-slip footwear when patient is out of bed/[place] to call system/Physically safe environment - no spills, clutter or unnecessary equipment/Purposeful Proactive Rounding/Room/bathroom lighting operational, light cord in reach

## 2023-04-20 NOTE — H&P ADULT - HISTORY OF PRESENT ILLNESS
Patient is an 88 y/o Catonese speaking male with PMH of dementia (oriented x 2 at baseline, typically ambulates with cane or walker), HTN, prior CVA- on plavix. Last taken on 4/19. BPH. He presents with altered mental status in the setting of 2 falls. Per ED records, patient's daughters stated that he had recent insomnia. Subsequently, trazodone increased from 50 mg to 100 mg before bedtime 2 days ago. Pt had 2 falls today, both unwitnessed, unknown downtime. Daughter states patient had urinary incontinence. He presented to [place] ED by EMS. 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 He was started on Cardene and given Keppra. Last dose of Plavix was 4/19 and with possible NSTEMI, plavix not reversed. Currently denies chest pain, palpitations shortness of breath. Also had trauma imaging including CT Cspine/ chest/abd/pelvis. Transferred to [facility] for further management.  Patient is an 88 y/o Catonese speaking male with PMH of dementia (oriented x 2 at baseline, typically ambulates with cane or walker), HTN, prior CVA- on plavix. Last taken on 4/19. BPH. He presents with altered mental status in the setting of 2 falls. Per ED records, patient's daughters stated that he had recent insomnia. Subsequently, trazodone increased from 50 mg to 100 mg before bedtime 2 days ago. Pt had 2 falls today, both unwitnessed, unknown downtime. Daughter states patient had urinary incontinence. He presented to [place] ED by EMS. 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 He was started on Cardene and given Keppra. Last dose of Plavix was 4/19 and with possible NSTEMI, plavix not reversed. Currently denies chest pain, palpitations shortness of breath. Also had trauma imaging including CT Cspine/ chest/abd/pelvis. Transferred to [facility] for further management. ICH score 1, NIHSS 1. GCS 13.

## 2023-04-21 PROBLEM — M51.26 OTHER INTERVERTEBRAL DISC DISPLACEMENT, LUMBAR REGION: Chronic | Status: ACTIVE | Noted: 2021-09-11

## 2023-04-21 PROBLEM — I49.9 CARDIAC ARRHYTHMIA, UNSPECIFIED: Chronic | Status: ACTIVE | Noted: 2021-09-11

## 2023-04-21 PROBLEM — N40.0 BENIGN PROSTATIC HYPERPLASIA WITHOUT LOWER URINARY TRACT SYMPTOMS: Chronic | Status: ACTIVE | Noted: 2021-09-11

## 2023-04-21 LAB
ANION GAP SERPL CALC-SCNC: 9 MMOL/L — SIGNIFICANT CHANGE UP (ref 5–17)
BLD GP AB SCN SERPL QL: NEGATIVE — SIGNIFICANT CHANGE UP
BUN SERPL-MCNC: 12 MG/DL — SIGNIFICANT CHANGE UP (ref 7–23)
CALCIUM SERPL-MCNC: 8.1 MG/DL — LOW (ref 8.4–10.5)
CHLORIDE SERPL-SCNC: 104 MMOL/L — SIGNIFICANT CHANGE UP (ref 96–108)
CK SERPL-CCNC: 1455 U/L — HIGH (ref 30–200)
CK SERPL-CCNC: 1526 U/L — HIGH (ref 30–200)
CO2 SERPL-SCNC: 25 MMOL/L — SIGNIFICANT CHANGE UP (ref 22–31)
CREAT SERPL-MCNC: 0.71 MG/DL — SIGNIFICANT CHANGE UP (ref 0.5–1.3)
CULTURE RESULTS: NO GROWTH — SIGNIFICANT CHANGE UP
EGFR: 89 ML/MIN/1.73M2 — SIGNIFICANT CHANGE UP
GLUCOSE BLDC GLUCOMTR-MCNC: 101 MG/DL — HIGH (ref 70–99)
GLUCOSE BLDC GLUCOMTR-MCNC: 105 MG/DL — HIGH (ref 70–99)
GLUCOSE BLDC GLUCOMTR-MCNC: 114 MG/DL — HIGH (ref 70–99)
GLUCOSE SERPL-MCNC: 101 MG/DL — HIGH (ref 70–99)
HCT VFR BLD CALC: 32.4 % — LOW (ref 39–50)
HGB BLD-MCNC: 10.8 G/DL — LOW (ref 13–17)
MAGNESIUM SERPL-MCNC: 2.1 MG/DL — SIGNIFICANT CHANGE UP (ref 1.6–2.6)
MCHC RBC-ENTMCNC: 32.8 PG — SIGNIFICANT CHANGE UP (ref 27–34)
MCHC RBC-ENTMCNC: 33.3 GM/DL — SIGNIFICANT CHANGE UP (ref 32–36)
MCV RBC AUTO: 98.5 FL — SIGNIFICANT CHANGE UP (ref 80–100)
NRBC # BLD: 0 /100 WBCS — SIGNIFICANT CHANGE UP (ref 0–0)
PA ADP PRP-ACNC: 296 PRU — SIGNIFICANT CHANGE UP (ref 194–418)
PHOSPHATE SERPL-MCNC: 2.7 MG/DL — SIGNIFICANT CHANGE UP (ref 2.5–4.5)
PLATELET # BLD AUTO: 141 K/UL — LOW (ref 150–400)
POTASSIUM SERPL-MCNC: 3.5 MMOL/L — SIGNIFICANT CHANGE UP (ref 3.5–5.3)
POTASSIUM SERPL-SCNC: 3.5 MMOL/L — SIGNIFICANT CHANGE UP (ref 3.5–5.3)
RBC # BLD: 3.29 M/UL — LOW (ref 4.2–5.8)
RBC # FLD: 12.4 % — SIGNIFICANT CHANGE UP (ref 10.3–14.5)
RH IG SCN BLD-IMP: POSITIVE — SIGNIFICANT CHANGE UP
SODIUM SERPL-SCNC: 138 MMOL/L — SIGNIFICANT CHANGE UP (ref 135–145)
SPECIMEN SOURCE: SIGNIFICANT CHANGE UP
TROPONIN T SERPL-MCNC: 0.03 NG/ML — HIGH (ref 0–0.01)
TROPONIN T SERPL-MCNC: 0.04 NG/ML — CRITICAL HIGH (ref 0–0.01)
WBC # BLD: 9.12 K/UL — SIGNIFICANT CHANGE UP (ref 3.8–10.5)
WBC # FLD AUTO: 9.12 K/UL — SIGNIFICANT CHANGE UP (ref 3.8–10.5)

## 2023-04-21 PROCEDURE — 70450 CT HEAD/BRAIN W/O DYE: CPT | Mod: 26,59

## 2023-04-21 PROCEDURE — 70496 CT ANGIOGRAPHY HEAD: CPT | Mod: 26

## 2023-04-21 PROCEDURE — 95720 EEG PHY/QHP EA INCR W/VEEG: CPT

## 2023-04-21 PROCEDURE — 99233 SBSQ HOSP IP/OBS HIGH 50: CPT

## 2023-04-21 PROCEDURE — 70498 CT ANGIOGRAPHY NECK: CPT | Mod: 26

## 2023-04-21 PROCEDURE — 76705 ECHO EXAM OF ABDOMEN: CPT | Mod: 26

## 2023-04-21 PROCEDURE — 93306 TTE W/DOPPLER COMPLETE: CPT | Mod: 26

## 2023-04-21 PROCEDURE — 99291 CRITICAL CARE FIRST HOUR: CPT

## 2023-04-21 PROCEDURE — 93970 EXTREMITY STUDY: CPT | Mod: 26

## 2023-04-21 RX ORDER — TAMSULOSIN HYDROCHLORIDE 0.4 MG/1
1 CAPSULE ORAL
Qty: 0 | Refills: 0 | DISCHARGE

## 2023-04-21 RX ORDER — DONEPEZIL HYDROCHLORIDE 10 MG/1
5 TABLET, FILM COATED ORAL AT BEDTIME
Refills: 0 | Status: DISCONTINUED | OUTPATIENT
Start: 2023-04-21 | End: 2023-04-28

## 2023-04-21 RX ORDER — HYDRALAZINE HCL 50 MG
5 TABLET ORAL ONCE
Refills: 0 | Status: COMPLETED | OUTPATIENT
Start: 2023-04-21 | End: 2023-04-21

## 2023-04-21 RX ORDER — FINASTERIDE 5 MG/1
5 TABLET, FILM COATED ORAL DAILY
Refills: 0 | Status: DISCONTINUED | OUTPATIENT
Start: 2023-04-21 | End: 2023-04-28

## 2023-04-21 RX ORDER — HYDRALAZINE HCL 50 MG
5 TABLET ORAL ONCE
Refills: 0 | Status: DISCONTINUED | OUTPATIENT
Start: 2023-04-21 | End: 2023-04-21

## 2023-04-21 RX ORDER — HYDRALAZINE HCL 50 MG
25 TABLET ORAL EVERY 8 HOURS
Refills: 0 | Status: DISCONTINUED | OUTPATIENT
Start: 2023-04-21 | End: 2023-04-22

## 2023-04-21 RX ORDER — DEXTROSE 50 % IN WATER 50 %
15 SYRINGE (ML) INTRAVENOUS ONCE
Refills: 0 | Status: DISCONTINUED | OUTPATIENT
Start: 2023-04-21 | End: 2023-04-21

## 2023-04-21 RX ORDER — DEXTROSE 50 % IN WATER 50 %
25 SYRINGE (ML) INTRAVENOUS ONCE
Refills: 0 | Status: DISCONTINUED | OUTPATIENT
Start: 2023-04-21 | End: 2023-04-21

## 2023-04-21 RX ORDER — CHOLECALCIFEROL (VITAMIN D3) 125 MCG
2 CAPSULE ORAL
Refills: 0 | DISCHARGE

## 2023-04-21 RX ORDER — LISINOPRIL 2.5 MG/1
10 TABLET ORAL DAILY
Refills: 0 | Status: DISCONTINUED | OUTPATIENT
Start: 2023-04-21 | End: 2023-04-24

## 2023-04-21 RX ORDER — FINASTERIDE 5 MG/1
1 TABLET, FILM COATED ORAL
Refills: 0 | DISCHARGE

## 2023-04-21 RX ORDER — SODIUM CHLORIDE 9 MG/ML
1000 INJECTION, SOLUTION INTRAVENOUS
Refills: 0 | Status: DISCONTINUED | OUTPATIENT
Start: 2023-04-21 | End: 2023-04-21

## 2023-04-21 RX ORDER — POTASSIUM PHOSPHATE, MONOBASIC POTASSIUM PHOSPHATE, DIBASIC 236; 224 MG/ML; MG/ML
15 INJECTION, SOLUTION INTRAVENOUS ONCE
Refills: 0 | Status: COMPLETED | OUTPATIENT
Start: 2023-04-21 | End: 2023-04-21

## 2023-04-21 RX ORDER — DEXTROSE 50 % IN WATER 50 %
12.5 SYRINGE (ML) INTRAVENOUS ONCE
Refills: 0 | Status: DISCONTINUED | OUTPATIENT
Start: 2023-04-21 | End: 2023-04-21

## 2023-04-21 RX ORDER — SODIUM CHLORIDE 9 MG/ML
1000 INJECTION INTRAMUSCULAR; INTRAVENOUS; SUBCUTANEOUS
Refills: 0 | Status: DISCONTINUED | OUTPATIENT
Start: 2023-04-21 | End: 2023-04-22

## 2023-04-21 RX ORDER — FINASTERIDE 5 MG/1
1 TABLET, FILM COATED ORAL
Qty: 0 | Refills: 0 | DISCHARGE

## 2023-04-21 RX ORDER — MECLIZINE HCL 12.5 MG
1 TABLET ORAL
Qty: 0 | Refills: 0 | DISCHARGE

## 2023-04-21 RX ORDER — METOPROLOL TARTRATE 50 MG
25 TABLET ORAL
Refills: 0 | Status: DISCONTINUED | OUTPATIENT
Start: 2023-04-21 | End: 2023-04-23

## 2023-04-21 RX ORDER — TAMSULOSIN HYDROCHLORIDE 0.4 MG/1
0.4 CAPSULE ORAL AT BEDTIME
Refills: 0 | Status: DISCONTINUED | OUTPATIENT
Start: 2023-04-21 | End: 2023-04-28

## 2023-04-21 RX ORDER — INSULIN LISPRO 100/ML
VIAL (ML) SUBCUTANEOUS EVERY 6 HOURS
Refills: 0 | Status: DISCONTINUED | OUTPATIENT
Start: 2023-04-21 | End: 2023-04-21

## 2023-04-21 RX ORDER — SINCALIDE 5 UG/5ML
1.1 INJECTION, POWDER, LYOPHILIZED, FOR SOLUTION INTRAVENOUS ONCE
Refills: 0 | Status: DISCONTINUED | OUTPATIENT
Start: 2023-04-22 | End: 2023-04-22

## 2023-04-21 RX ORDER — LABETALOL HCL 100 MG
10 TABLET ORAL ONCE
Refills: 0 | Status: COMPLETED | OUTPATIENT
Start: 2023-04-21 | End: 2023-04-21

## 2023-04-21 RX ADMIN — Medication 5 MILLIGRAM(S): at 06:10

## 2023-04-21 RX ADMIN — POTASSIUM PHOSPHATE, MONOBASIC POTASSIUM PHOSPHATE, DIBASIC 62.5 MILLIMOLE(S): 236; 224 INJECTION, SOLUTION INTRAVENOUS at 06:10

## 2023-04-21 RX ADMIN — TAMSULOSIN HYDROCHLORIDE 0.4 MILLIGRAM(S): 0.4 CAPSULE ORAL at 22:31

## 2023-04-21 RX ADMIN — DONEPEZIL HYDROCHLORIDE 5 MILLIGRAM(S): 10 TABLET, FILM COATED ORAL at 22:31

## 2023-04-21 RX ADMIN — CHLORHEXIDINE GLUCONATE 1 APPLICATION(S): 213 SOLUTION TOPICAL at 05:34

## 2023-04-21 RX ADMIN — LEVETIRACETAM 400 MILLIGRAM(S): 250 TABLET, FILM COATED ORAL at 06:29

## 2023-04-21 RX ADMIN — Medication 25 MILLIGRAM(S): at 22:31

## 2023-04-21 RX ADMIN — LISINOPRIL 10 MILLIGRAM(S): 2.5 TABLET ORAL at 17:25

## 2023-04-21 RX ADMIN — POTASSIUM PHOSPHATE, MONOBASIC POTASSIUM PHOSPHATE, DIBASIC 62.5 MILLIMOLE(S): 236; 224 INJECTION, SOLUTION INTRAVENOUS at 02:24

## 2023-04-21 RX ADMIN — LEVETIRACETAM 400 MILLIGRAM(S): 250 TABLET, FILM COATED ORAL at 17:25

## 2023-04-21 RX ADMIN — Medication 25 MILLIGRAM(S): at 18:39

## 2023-04-21 RX ADMIN — Medication 1000 MILLIGRAM(S): at 00:00

## 2023-04-21 RX ADMIN — Medication 5 MILLIGRAM(S): at 16:10

## 2023-04-21 RX ADMIN — SODIUM CHLORIDE 125 MILLILITER(S): 9 INJECTION INTRAMUSCULAR; INTRAVENOUS; SUBCUTANEOUS at 05:00

## 2023-04-21 RX ADMIN — SENNA PLUS 2 TABLET(S): 8.6 TABLET ORAL at 22:31

## 2023-04-21 RX ADMIN — Medication 25 MILLIGRAM(S): at 14:52

## 2023-04-21 RX ADMIN — Medication 10 MILLIGRAM(S): at 17:25

## 2023-04-21 RX ADMIN — POLYETHYLENE GLYCOL 3350 17 GRAM(S): 17 POWDER, FOR SOLUTION ORAL at 11:18

## 2023-04-21 NOTE — SWALLOW BEDSIDE ASSESSMENT ADULT - SLP PERTINENT HISTORY OF CURRENT PROBLEM
88 y/o Catonese speaking male with PMH of dementia (oriented x 2 at baseline) prior CVA, presented to ED at [place] with AMS. 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

## 2023-04-21 NOTE — PROGRESS NOTE ADULT - SUBJECTIVE AND OBJECTIVE BOX
***********************************************  ADULT NSICU PROGRESS NOTE  MARIELY QUINTEROS [id number] [facility] [id number]  ***********************************************    24H INTERVAL EVENTS:  - Admitted overnight as transfer from [facility] for R. frontal cortical IPH in setting of fall.     ROS: negative except per mentioned above in 24h interval events.      HOSPITAL COURSE CARRIED FORWARD:    VITALS:    ICU Vital Signs Last 24 Hrs  T(C): 36.4 (21 Apr 2023 09:15), Max: 37.6 (20 Apr 2023 21:18)  T(F): 97.5 (21 Apr 2023 09:15), Max: 99.7 (20 Apr 2023 21:18)  HR: 77 (21 Apr 2023 12:19) (54 - 77)  BP: 160/68 (21 Apr 2023 12:19) (131/60 - 173/72)  BP(mean): 98 (21 Apr 2023 12:19) (86 - 106)  ABP: --  ABP(mean): --  RR: 22 (21 Apr 2023 12:19) (13 - 28)  SpO2: 99% (21 Apr 2023 12:19) (95% - 99%)    O2 Parameters below as of 21 Apr 2023 12:19  Patient On (Oxygen Delivery Method): room air                I&O's Summary    20 Apr 2023 07:01  -  21 Apr 2023 07:00  --------------------------------------------------------  IN: 1775 mL / OUT: 700 mL / NET: 1075 mL    21 Apr 2023 07:01  -  21 Apr 2023 14:07  --------------------------------------------------------  IN: 812.5 mL / OUT: 275 mL / NET: 537.5 mL        EXAM:     [place] Coma Scale: 14     General: normocephalic, atraumatic, laying in bed, in no distress  Neuro     MS: eyes open, laughing, alert x2, unintelligible speech w/ presence of cantonese , follows simple commands, cooperative to an extent    CN: PERRL, VF FTC, EOMI and no ptosis bilaterally, sensation intact to crude touch V1-V3, face symmetric, hearing grossly intact    Mot: bulk normal, tone normal, power 5/5 in bilateral upper and lower proximal extremities  Chest: nonlabored respirations, no adventitious lung sounds bilaterally, heart regular rate/rhythm, present S1/S2, no murmurs or rubs  Abdomen: nondistended, soft and nontender without peritoneal signs, normoactive bowel sounds  Extremities: no clubbing, well-perfused, no edema    LABORATORY DATA:                            10.8   9.12  )-----------( 141      ( 21 Apr 2023 04:07 )             32.4     04-21    138  |  104  |  12  ----------------------------<  101<H>  3.5   |  25  |  0.71    Ca    8.1<L>      21 Apr 2023 04:07  Phos  2.7     04-21  Mg     2.1     04-21    TPro  6.8  /  Alb  4.1  /  TBili  0.7  /  DBili  x   /  AST  46<H>  /  ALT  20  /  AlkPhos  52  04-20    LIVER FUNCTIONS - ( 20 Apr 2023 21:57 )  Alb: 4.1 g/dL / Pro: 6.8 g/dL / ALK PHOS: 52 U/L / ALT: 20 U/L / AST: 46 U/L / GGT: x           PT/INR - ( 20 Apr 2023 21:57 )   PT: 12.7 sec;   INR: 1.07          PTT - ( 20 Apr 2023 21:57 )  PTT:27.9 sec    IMAGING DATA:    CARDIOLOGY DATA:    MICROBIOLOGY DATA:        MEDICATIONS  (STANDING):  chlorhexidine 2% Cloths 1 Application(s) Topical <User Schedule>  dextrose 5%. 1000 milliLiter(s) (100 mL/Hr) IV Continuous <Continuous>  dextrose 50% Injectable 25 Gram(s) IV Push once  glucagon  Injectable 1 milliGRAM(s) IntraMuscular once  insulin lispro (ADMELOG) corrective regimen sliding scale   SubCutaneous every 6 hours  levETIRAcetam  IVPB 500 milliGRAM(s) IV Intermittent every 12 hours  polyethylene glycol 3350 17 Gram(s) Oral daily  senna 2 Tablet(s) Oral at bedtime  sodium chloride 0.9%. 1000 milliLiter(s) (125 mL/Hr) IV Continuous <Continuous>    MEDICATIONS  (PRN):  acetaminophen     Tablet .. 650 milliGRAM(s) Oral every 6 hours PRN Temp greater or equal to 38C (100.4F), Mild Pain (1 - 3)  dextrose Oral Gel 15 Gram(s) Oral once PRN Blood Glucose LESS THAN 70 milliGRAM(s)/deciliter      ***********************************************  ASSESSMENT AND PLAN  ***********************************************    #R. 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  #Rhabdo  #History of dementia, (a/ox2 at baseline), HTN, prior CVA (on plavix, which was not reversed 2/2 c/f NSTEMI)    NEURO - admit NSICU, Q1h neuro checks / Q1h vital signs, LEV, PT/OT, pain control, holding home plavix  PULM - SpO2 goal > 92%, supplemental O2 and pulm toileting as needed  CARDIO - BP goal < 160  GI - bowel regimen, stool count, diet: pendin gswallow eval  /RENAL - monitor UOP/volume status, BUN/SCr  HEME - maintain Hb > 7.0, PLT > 100,000, holding DVT chemoppx  ID - monitor for infectious s/s, fever curve, leukocytosis, isotonic MIVF, trending CPK  ENDO - SGlu goal < 200

## 2023-04-21 NOTE — OCCUPATIONAL THERAPY INITIAL EVALUATION ADULT - LEVEL OF INDEPENDENCE: DRESS LOWER BODY, OT EVAL
don socks semi supine in bed, demo impaired motor planning/sequencing and decreased command following. Pt with impaired functional reach/grasp 2/2 impaired coordination/motor control./maximum assist (25% patients effort)

## 2023-04-21 NOTE — OCCUPATIONAL THERAPY INITIAL EVALUATION ADULT - PERTINENT HX OF CURRENT PROBLEM, REHAB EVAL
Patient is an 88 y/o Catonese speaking male with PMH of dementia (oriented x 2 at baseline, typically ambulates with cane or walker), HTN, prior CVA- on plavix. Last taken on 4/19. BPH. He presents with altered mental status in the setting of 2 falls. Per ED records, patient's daughters stated that he had recent insomnia. Subsequently, trazodone increased from 50 mg to 100 mg before bedtime 2 days ago. Pt had 2 falls today, both unwitnessed, unknown downtime. Daughter states patient had urinary incontinence. He presented to [place] ED by EMS. 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 He was started on Cardene and given Keppra. Last dose of Plavix was 4/19 and with possible NSTEMI, plavix not reversed. Currently denies chest pain, palpitations shortness of breath. Also had trauma imaging including CT Cspine/ chest/abd/pelvis. Transferred to [facility] for further management. ICH score 1, NIHSS 1. GCS 13.

## 2023-04-21 NOTE — PROGRESS NOTE ADULT - SUBJECTIVE AND OBJECTIVE BOX
S/Overnight events: Hypertensive last night, max systolic 173, diastolic 92. 5mg of Hydralazine IVP given. BPs stabilized, approx. 160/68.  Trend CK, troponin. Neuro stable.     Hospital Course:   : Transferred from [facility]. Sodium 134. Given 150cc bolus of 3%. CK elevated, c/f rhabdo. Started NS @ 100. Troponin elevated. EKG ordered, remarkable for RBBB, no acute ischemic changes. XR b/l tib/fib ordered as patient rubbing b/l shins in pain, s/p 2 unwitnessed falls.   : ANJEL overnight. Neuro stable. Stability CTH and CTA head and neck completed, follow up read. EEG placed. Troponin 0.04 from 0.05, likely from rhabdo, will continue to trend. CK 1526 from 1079. Increased maintenance fluids to NS@125. XR b/l tib fib negative for acute fracutre. Given hydralazine 5mg IVP x1 for SBP>160. Passed S&S for pureed diet. CK and troponin downtrending.       Vital Signs Last 24 Hrs  T(C): 36.4 (2023 09:15), Max: 37.6 (2023 21:18)  T(F): 97.5 (2023 09:15), Max: 99.7 (2023 21:18)  HR: 56 (2023 12:00) (54 - 73)  BP: 161/70 (2023 12:00) (131/60 - 173/72)  BP(mean): 100 (2023 12:00) (86 - 106)  RR: 21 (2023 12:00) (13 - 24)  SpO2: 98% (2023 12:00) (95% - 99%)    Parameters below as of 2023 12:00  Patient On (Oxygen Delivery Method): room air        I&O's Detail    2023 07:01  -  2023 07:00  --------------------------------------------------------  IN:    IV PiggyBack: 100 mL    IV PiggyBack: 625 mL    sodium chloride 0.9%: 75 mL    sodium chloride 0.9%: 600 mL    sodium chloride 0.9%: 375 mL  Total IN: 1775 mL    OUT:    Incontinent per Condom Catheter (mL): 700 mL  Total OUT: 700 mL    Total NET: 1075 mL      2023 07:  -  2023 13:26  --------------------------------------------------------  IN:    IV PiggyBack: 187.5 mL    sodium chloride 0.9%: 625 mL  Total IN: 812.5 mL    OUT:    Incontinent per Condom Catheter (mL): 275 mL  Total OUT: 275 mL    Total NET: 537.5 mL        I&O's Summary    2023 07: 07:00  --------------------------------------------------------  IN: 1775 mL / OUT: 700 mL / NET: 1075 mL    2023 07:  -  2023 13:26  --------------------------------------------------------  IN: 812.5 mL / OUT: 275 mL / NET: 537.5 mL        PHYSICAL EXAM:  General: No acute distress. Non-toxic appearing. Sitting comfortably in bed.   HENT: Kerlix head wrap in place. PERRL, 3 mm b/l. EOM in tact. Visual acuity grossly in tact.   Cardiac: Regular rate and rhythm. S1, S2 appreciated. No murmurs, gallops, rubs.   Pulm: Breathing unlabored on RA. Clear to auscultation b/l. No wheezes, rhonchi, rales.   Abdomen: Soft, non-distended. Non-tender to palpation. +BS  Neuro: A&O x2 (baseline). Speech fluent. Affect appropriate.   See HENT. CN II-XII without deficits.  Upper extremity strength 5/5. Finger  3 b/l. Exam limited by effort.   Lower extremity strength 5/5. Sensation to LT in all 4 extremities.   Extremities: Skin is warm, dry. Non-edematous b/l. DP 2+ b/l    Incision/Wound: Kerlix head wrap in place     LABS:                        10.8   9.12  )-----------( 141      ( 2023 04:07 )             32.4     04-21    138  |  104  |  12  ----------------------------<  101<H>  3.5   |  25  |  0.71    Ca    8.1<L>      2023 04:07  Phos  2.7     04-21  Mg     2.1     04-21    TPro  6.8  /  Alb  4.1  /  TBili  0.7  /  DBili  x   /  AST  46<H>  /  ALT  20  /  AlkPhos  52  04-20    PT/INR - ( 2023 21:57 )   PT: 12.7 sec;   INR: 1.07          PTT - ( 2023 21:57 )  PTT:27.9 sec  Urinalysis Basic - ( 2023 18:00 )    Color: Yellow / Appearance: Clear / S.015 / pH: x  Gluc: x / Ketone: Negative  / Bili: Negative / Urobili: Negative   Blood: x / Protein: 30 mg/dL / Nitrite: Negative   Leuk Esterase: Negative / RBC: 10-25 /HPF / WBC 3-5 /HPF   Sq Epi: x / Non Sq Epi: x / Bacteria: Trace /HPF      CARDIAC MARKERS ( 2023 10:45 )  x     / 0.03 ng/mL / 1455 U/L / x     / x      CARDIAC MARKERS ( 2023 04:07 )  x     / 0.04 ng/mL / 1526 U/L / x     / x      CARDIAC MARKERS ( 2023 21:57 )  x     / 0.05 ng/mL / 1079 U/L / x     / 10.9 ng/mL  CARDIAC MARKERS ( 2023 18:00 )  x     / x     / 900 U/L / x     / x        CAPILLARY BLOOD GLUCOSE    POCT Blood Glucose.: 105 mg/dL (2023 10:46)  POCT Blood Glucose.: 101 mg/dL (2023 06:18)  POCT Blood Glucose.: 121 mg/dL (2023 16:47)    Allergies    No Known Allergies    Intolerances    MEDICATIONS:  Antibiotics:    Neuro:  acetaminophen     Tablet .. 650 milliGRAM(s) Oral every 6 hours PRN  levETIRAcetam  IVPB 500 milliGRAM(s) IV Intermittent every 12 hours    Anticoagulation:    OTHER:  chlorhexidine 2% Cloths 1 Application(s) Topical <User Schedule>  dextrose 50% Injectable 25 Gram(s) IV Push once  dextrose Oral Gel 15 Gram(s) Oral once PRN  glucagon  Injectable 1 milliGRAM(s) IntraMuscular once  insulin lispro (ADMELOG) corrective regimen sliding scale   SubCutaneous every 6 hours  polyethylene glycol 3350 17 Gram(s) Oral daily  senna 2 Tablet(s) Oral at bedtime    IVF:  dextrose 5%. 1000 milliLiter(s) IV Continuous <Continuous>  sodium chloride 0.9%. 1000 milliLiter(s) IV Continuous <Continuous>    RADIOLOGY & ADDITIONAL TESTS:  - TTE w bubble study pending   - LE dopplers pending   - US abdomen () pending read       CT Head without contrast:   Impression: Stable right frontal intraparenchymal hematoma.     CTA neck with and without IV contrast:  IMPRESSION: Questionable FMD involving the distal right internal carotid artery.    ASSESSMENT:  87y Male s/p right frontal intraparenchymal hemorrhage. Neuro stable, will transfer to stroke team.     INTRACRANIAL HEMORRHAGE    No pertinent family history in first degree relatives    Handoff    No pertinent past medical history    BPH (benign prostatic hyperplasia)    Irregular heart rate    Lumbar disc herniation    Hypertension    Stroke    No significant past surgical history    PLAN:  NEURO:  -neuro/vitals q2  -Keppra 500 BID  -EEG  -CTH at OSH : Right frontal acute parenchymal hemorrhage  -stability CTH   -CTA head and neck : questionable FMD otherwise negative  -delirium precautions  -h/o stroke: home plavix on hold   -CT from OSH: T5, L5 compression fractures  -transfer care to stroke team     CARDIO:  --160  -h/o HTN  -EKG with RBBB, no acute ischemic changes  -pending TTE w/ bubble  - troponin (downtrending, 0.05 --> 0.03)    PULM:  -RA  -CT PE protocol neg    GI  -NPO  -bowel regimen  -CT ABD Pelvis at OSH: Cholelithiasis with focal gallbladder wall thickening  -  ABD US pending read    RENAL:  -NS @ 125 ml/hr   -s/p 150cc 3% bolus   -h/o BPH (confirm home meds Finasteride and Tamsulosin)   -Rhabdo, f/u AM CPK downtrending (1526 --> 1455)   -voiding    ENDO:  -ISS  -A1c 5.1  -lipid panel, TSH nl    HEME:  -SCDs for DVT ppx  -LE dopplers pending    ID  -afebrile    Dispo: transfer to stroke team, full code, pending PT/OT    Discussed with Dr. D'Amico and Dr. Mas     S/Overnight events: Hypertensive last night, max systolic 173, diastolic 92. 5mg of Hydralazine IVP given. BPs stabilized, approx. 160/68.  Trend CK, troponin. Neuro stable.     Hospital Course:   : Transferred from [facility]. Sodium 134. Given 150cc bolus of 3%. CK elevated, c/f rhabdo. Started NS @ 100. Troponin elevated. EKG ordered, remarkable for RBBB, no acute ischemic changes. XR b/l tib/fib ordered as patient rubbing b/l shins in pain, s/p 2 unwitnessed falls.   : ANJEL overnight. Neuro stable. Stability CTH and CTA head and neck completed, follow up read. EEG placed. Troponin 0.04 from 0.05, likely from rhabdo, will continue to trend. CK 1526 from 1079. Increased maintenance fluids to NS@125. XR b/l tib fib negative for acute fracutre. Given hydralazine 5mg IVP x1 for SBP>160. Passed S&S for pureed diet. CK and troponin downtrending.       Vital Signs Last 24 Hrs  T(C): 36.4 (2023 09:15), Max: 37.6 (2023 21:18)  T(F): 97.5 (2023 09:15), Max: 99.7 (2023 21:18)  HR: 56 (2023 12:00) (54 - 73)  BP: 161/70 (2023 12:00) (131/60 - 173/72)  BP(mean): 100 (2023 12:00) (86 - 106)  RR: 21 (2023 12:00) (13 - 24)  SpO2: 98% (2023 12:00) (95% - 99%)    Parameters below as of 2023 12:00  Patient On (Oxygen Delivery Method): room air        I&O's Detail    2023 07:01  -  2023 07:00  --------------------------------------------------------  IN:    IV PiggyBack: 100 mL    IV PiggyBack: 625 mL    sodium chloride 0.9%: 75 mL    sodium chloride 0.9%: 600 mL    sodium chloride 0.9%: 375 mL  Total IN: 1775 mL    OUT:    Incontinent per Condom Catheter (mL): 700 mL  Total OUT: 700 mL    Total NET: 1075 mL      2023 07:  -  2023 13:26  --------------------------------------------------------  IN:    IV PiggyBack: 187.5 mL    sodium chloride 0.9%: 625 mL  Total IN: 812.5 mL    OUT:    Incontinent per Condom Catheter (mL): 275 mL  Total OUT: 275 mL    Total NET: 537.5 mL        I&O's Summary    2023 07: 07:00  --------------------------------------------------------  IN: 1775 mL / OUT: 700 mL / NET: 1075 mL    2023 07:  -  2023 13:26  --------------------------------------------------------  IN: 812.5 mL / OUT: 275 mL / NET: 537.5 mL        PHYSICAL EXAM:  General: No acute distress. Non-toxic appearing. Sitting comfortably in bed.   HENT: Kerlix head wrap in place. PERRL, 3 mm b/l. EOM in tact. Visual acuity grossly in tact.   Cardiac: Regular rate and rhythm. S1, S2 appreciated. No murmurs, gallops, rubs.   Pulm: Breathing unlabored on RA. Clear to auscultation b/l. No wheezes, rhonchi, rales.   Abdomen: Soft, non-distended. Non-tender to palpation. +BS  Neuro: A&O x2 (baseline). Speech fluent. Affect appropriate.   See HENT. CN II-XII without deficits.  Upper extremity strength 5/5. Finger  3 b/l. Exam limited by effort.   Lower extremity strength 5/5. Sensation to LT in all 4 extremities.   Extremities: Skin is warm, dry. Non-edematous b/l. DP 2+ b/l    Incision/Wound: Kerlix head wrap in place     LABS:                        10.8   9.12  )-----------( 141      ( 2023 04:07 )             32.4     04-21    138  |  104  |  12  ----------------------------<  101<H>  3.5   |  25  |  0.71    Ca    8.1<L>      2023 04:07  Phos  2.7     04-21  Mg     2.1     04-21    TPro  6.8  /  Alb  4.1  /  TBili  0.7  /  DBili  x   /  AST  46<H>  /  ALT  20  /  AlkPhos  52  04-20    PT/INR - ( 2023 21:57 )   PT: 12.7 sec;   INR: 1.07          PTT - ( 2023 21:57 )  PTT:27.9 sec  Urinalysis Basic - ( 2023 18:00 )    Color: Yellow / Appearance: Clear / S.015 / pH: x  Gluc: x / Ketone: Negative  / Bili: Negative / Urobili: Negative   Blood: x / Protein: 30 mg/dL / Nitrite: Negative   Leuk Esterase: Negative / RBC: 10-25 /HPF / WBC 3-5 /HPF   Sq Epi: x / Non Sq Epi: x / Bacteria: Trace /HPF      CARDIAC MARKERS ( 2023 10:45 )  x     / 0.03 ng/mL / 1455 U/L / x     / x      CARDIAC MARKERS ( 2023 04:07 )  x     / 0.04 ng/mL / 1526 U/L / x     / x      CARDIAC MARKERS ( 2023 21:57 )  x     / 0.05 ng/mL / 1079 U/L / x     / 10.9 ng/mL  CARDIAC MARKERS ( 2023 18:00 )  x     / x     / 900 U/L / x     / x        CAPILLARY BLOOD GLUCOSE    POCT Blood Glucose.: 105 mg/dL (2023 10:46)  POCT Blood Glucose.: 101 mg/dL (2023 06:18)  POCT Blood Glucose.: 121 mg/dL (2023 16:47)    Allergies    No Known Allergies    Intolerances    MEDICATIONS:  Antibiotics:    Neuro:  acetaminophen     Tablet .. 650 milliGRAM(s) Oral every 6 hours PRN  levETIRAcetam  IVPB 500 milliGRAM(s) IV Intermittent every 12 hours    Anticoagulation:    OTHER:  chlorhexidine 2% Cloths 1 Application(s) Topical <User Schedule>  dextrose 50% Injectable 25 Gram(s) IV Push once  dextrose Oral Gel 15 Gram(s) Oral once PRN  glucagon  Injectable 1 milliGRAM(s) IntraMuscular once  insulin lispro (ADMELOG) corrective regimen sliding scale   SubCutaneous every 6 hours  polyethylene glycol 3350 17 Gram(s) Oral daily  senna 2 Tablet(s) Oral at bedtime    IVF:  dextrose 5%. 1000 milliLiter(s) IV Continuous <Continuous>  sodium chloride 0.9%. 1000 milliLiter(s) IV Continuous <Continuous>    RADIOLOGY & ADDITIONAL TESTS:  - TTE w bubble study pending   - LE dopplers pending   - US abdomen () pending read       CT Head without contrast:   Impression: Stable right frontal intraparenchymal hematoma.     CTA neck with and without IV contrast:  IMPRESSION: Questionable FMD involving the distal right internal carotid artery.    ASSESSMENT:  87y Male s/p right frontal intraparenchymal hemorrhage. Neuro stable, will transfer to stroke team.     INTRACRANIAL HEMORRHAGE    No pertinent family history in first degree relatives    Handoff    No pertinent past medical history    BPH (benign prostatic hyperplasia)    Irregular heart rate    Lumbar disc herniation    Hypertension    Stroke    No significant past surgical history    PLAN:  NEURO:  -neuro/vitals q2  -Keppra 500 BID  -EEG  -CTH at OSH : Right frontal acute parenchymal hemorrhage  -stability CTH   -CTA head and neck : questionable FMD otherwise negative  -delirium precautions  -h/o stroke: home plavix on hold   -CT from OSH: T5, L5 compression fractures  -transfer care to stroke team     CARDIO:  --160  -h/o HTN  -EKG with RBBB, no acute ischemic changes  -pending TTE w/ bubble  - troponin (downtrending, 0.05 --> 0.03)    PULM:  -RA  -CT PE protocol neg    GI:  -NPO  -bowel regimen  -CT ABD Pelvis at OSH: Cholelithiasis with focal gallbladder wall thickening  -  ABD US pending read    RENAL:  -NS @ 125 ml/hr   -s/p 150cc 3% bolus   -h/o BPH (confirm home meds Finasteride and Tamsulosin)   -Rhabdo, f/u AM CPK downtrending (1526 --> 1455)   -voiding    ENDO:  -ISS  -A1c 5.1  -lipid panel, TSH nl    HEME:  -SCDs for DVT ppx  -LE dopplers pending    ID  -afebrile    Dispo: transfer to stroke team, full code, pending PT/OT    Discussed with Dr. D'Amico and Dr. Mas     S/Overnight events: Hypertensive last night, max systolic 173, diastolic 92. 5mg of Hydralazine IVP given. BPs stabilized, approx. 160/68. Neuro stable.     Hospital Course:   : Transferred from [facility]. Sodium 134. Given 150cc bolus of 3%. CK elevated, c/f rhabdo. Started NS @ 100. Troponin elevated. EKG ordered, remarkable for RBBB, no acute ischemic changes. XR b/l tib/fib ordered as patient rubbing b/l shins in pain, s/p 2 unwitnessed falls.   : ANJEL overnight. Neuro stable. Stability CTH and CTA head and neck completed, follow up read. EEG placed. Troponin 0.04 from 0.05, likely from rhabdo, will continue to trend. CK 1526 from 1079. Increased maintenance fluids to NS@125. XR b/l tib fib negative for acute fracutre. Given hydralazine 5mg IVP x1 for SBP>160. Passed S&S for pureed diet. CK and troponin downtrending.       Vital Signs Last 24 Hrs  T(C): 36.4 (2023 09:15), Max: 37.6 (2023 21:18)  T(F): 97.5 (2023 09:15), Max: 99.7 (2023 21:18)  HR: 56 (2023 12:00) (54 - 73)  BP: 161/70 (2023 12:00) (131/60 - 173/72)  BP(mean): 100 (2023 12:00) (86 - 106)  RR: 21 (2023 12:00) (13 - 24)  SpO2: 98% (2023 12:00) (95% - 99%)    Parameters below as of 2023 12:00  Patient On (Oxygen Delivery Method): room air        I&O's Detail    2023 07:01  -  2023 07:00  --------------------------------------------------------  IN:    IV PiggyBack: 100 mL    IV PiggyBack: 625 mL    sodium chloride 0.9%: 75 mL    sodium chloride 0.9%: 600 mL    sodium chloride 0.9%: 375 mL  Total IN: 1775 mL    OUT:    Incontinent per Condom Catheter (mL): 700 mL  Total OUT: 700 mL    Total NET: 1075 mL      2023 07:01  -  2023 13:26  --------------------------------------------------------  IN:    IV PiggyBack: 187.5 mL    sodium chloride 0.9%: 625 mL  Total IN: 812.5 mL    OUT:    Incontinent per Condom Catheter (mL): 275 mL  Total OUT: 275 mL    Total NET: 537.5 mL        I&O's Summary    2023 07:  -  2023 07:00  --------------------------------------------------------  IN: 1775 mL / OUT: 700 mL / NET: 1075 mL    2023 07:  -  2023 13:26  --------------------------------------------------------  IN: 812.5 mL / OUT: 275 mL / NET: 537.5 mL        PHYSICAL EXAM:  General: No acute distress. Non-toxic appearing. Sitting comfortably in bed.   HENT: Kerlix head wrap in place (EEG). PERRL, 3 mm b/l. EOM in tact. Visual acuity grossly in tact.   Cardiac: Regular rate and rhythm. S1, S2 appreciated. No murmurs, gallops, rubs.   Pulm: Breathing unlabored on RA. Clear to auscultation b/l. No wheezes, rhonchi, rales.   Abdomen: Soft, non-distended. Non-tender to palpation. +BS  Neuro: A&O x2 (baseline). Speech fluent. Affect appropriate.   See HENT. CN II-XII without deficits.  Upper extremity strength 5/5. hand  3 b/l. Exam limited by effort.   Lower extremity strength 5/5. Sensation to LT in all 4 extremities.   Extremities: Skin is warm, dry. Non-edematous b/l. DP 2+ b/l    LABS:                        10.8   9.12  )-----------( 141      ( 2023 04:07 )             32.4     04-21    138  |  104  |  12  ----------------------------<  101<H>  3.5   |  25  |  0.71    Ca    8.1<L>      2023 04:07  Phos  2.7     04-21  Mg     2.1     -21    TPro  6.8  /  Alb  4.1  /  TBili  0.7  /  DBili  x   /  AST  46<H>  /  ALT  20  /  AlkPhos  52  04-20    PT/INR - ( 2023 21:57 )   PT: 12.7 sec;   INR: 1.07          PTT - ( 2023 21:57 )  PTT:27.9 sec  Urinalysis Basic - ( 2023 18:00 )    Color: Yellow / Appearance: Clear / S.015 / pH: x  Gluc: x / Ketone: Negative  / Bili: Negative / Urobili: Negative   Blood: x / Protein: 30 mg/dL / Nitrite: Negative   Leuk Esterase: Negative / RBC: 10-25 /HPF / WBC 3-5 /HPF   Sq Epi: x / Non Sq Epi: x / Bacteria: Trace /HPF      CARDIAC MARKERS ( 2023 10:45 )  x     / 0.03 ng/mL / 1455 U/L / x     / x      CARDIAC MARKERS ( 2023 04:07 )  x     / 0.04 ng/mL / 1526 U/L / x     / x      CARDIAC MARKERS ( 2023 21:57 )  x     / 0.05 ng/mL / 1079 U/L / x     / 10.9 ng/mL  CARDIAC MARKERS ( 2023 18:00 )  x     / x     / 900 U/L / x     / x        CAPILLARY BLOOD GLUCOSE    POCT Blood Glucose.: 105 mg/dL (2023 10:46)  POCT Blood Glucose.: 101 mg/dL (2023 06:18)  POCT Blood Glucose.: 121 mg/dL (2023 16:47)    Allergies    No Known Allergies    Intolerances    MEDICATIONS:  Antibiotics:    Neuro:  acetaminophen     Tablet .. 650 milliGRAM(s) Oral every 6 hours PRN  levETIRAcetam  IVPB 500 milliGRAM(s) IV Intermittent every 12 hours    Anticoagulation:    OTHER:  chlorhexidine 2% Cloths 1 Application(s) Topical <User Schedule>  dextrose 50% Injectable 25 Gram(s) IV Push once  dextrose Oral Gel 15 Gram(s) Oral once PRN  glucagon  Injectable 1 milliGRAM(s) IntraMuscular once  insulin lispro (ADMELOG) corrective regimen sliding scale   SubCutaneous every 6 hours  polyethylene glycol 3350 17 Gram(s) Oral daily  senna 2 Tablet(s) Oral at bedtime    IVF:  dextrose 5%. 1000 milliLiter(s) IV Continuous <Continuous>  sodium chloride 0.9%. 1000 milliLiter(s) IV Continuous <Continuous>    RADIOLOGY & ADDITIONAL TESTS:  - TTE w bubble study pending   - LE dopplers pending   - US abdomen () pending read       CT Head without contrast:   Impression: Stable right frontal intraparenchymal hematoma.     CTA neck with and without IV contrast:  IMPRESSION: Questionable FMD involving the distal right internal carotid artery.    ASSESSMENT:  87y Male s/p right frontal intraparenchymal hemorrhage. Neuro stable, will transfer to stroke team.     INTRACRANIAL HEMORRHAGE    No pertinent family history in first degree relatives    Handoff    No pertinent past medical history    BPH (benign prostatic hyperplasia)    Irregular heart rate    Lumbar disc herniation    Hypertension    Stroke    No significant past surgical history    PLAN:  NEURO:  -neuro/vitals q2  -Keppra 500 BID  -EEG  -CTH at OSH : Right frontal acute parenchymal hemorrhage  -stability CTH   -CTA head and neck : questionable FMD otherwise negative  -delirium precautions  -h/o stroke: home plavix on hold   -CT from OSH: T5, L5 compression fractures  -transfer care to stroke team     CARDIO:  --160  -metoprolol 25mg BID and hydralazine 25mg TID for h/o HTN  -EKG with RBBB, no acute ischemic changes  -pending TTE w/ bubble  - troponin (downtrending, 0.05 --> 0.03)    PULM:  -RA  -CT PE protocol neg    GI:  -NPO  -bowel regimen  -CT ABD Pelvis at OSH: Cholelithiasis with focal gallbladder wall thickening  -  ABD US pending read    RENAL:  -NS @ 125 ml/hr   -s/p 150cc 3% bolus   -h/o BPH (cont Finasteride and Tamsulosin)   -Rhabdo, f/u AM CPK downtrending (1526 --> 1455)   -voiding    ENDO:  -ISS  -A1c 5.1  -lipid panel, TSH nl    HEME:  -SCDs for DVT ppx  -LE dopplers pending    ID  -afebrile    Dispo: transfer to stroke team, full code, pending PT/OT    Discussed with Dr. D'Amico and Dr. Mas

## 2023-04-21 NOTE — DIETITIAN INITIAL EVALUATION ADULT - OTHER INFO
86 y/o Catonese speaking male with PMH of dementia (oriented x 2 at baseline, typically ambulates with cane or walker), HTN, prior CVA- on plavix, (Last taken on 4/19) BPH presented to ED at [facility] with altered mental status in the setting of 2 unwitnessed falls with unknown downtime. Daughter states patient had urinary incontinence. 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 S/p SLP eval 4/21 with recs for pureed diet with thin liquids.     On assessment, pt resting in bed, noted to be confused and unable to participate in exam. Pt was NPO at time of assessment, now advanced to pureed diet per SLPs recs- RD to follow to assess %PO intake. No n/v/d/c. No abd distention or discomfort noted. Last BM PTA. Skin WDL, ecchymotic, harshad score 17. No pain reported. Unable to assess UBW nor appetite PTA. NKFA per EMR. Education deferred. RD to follow.

## 2023-04-21 NOTE — SWALLOW BEDSIDE ASSESSMENT ADULT - ORAL PHASE
Unremarkable with puree and liquids. Pt expectorated chewable solids without attempt at mastication.

## 2023-04-21 NOTE — PHYSICAL THERAPY INITIAL EVALUATION ADULT - NAME OF CLINICIAN
RICHY Jackson RICHY Jackson. Nsx ANDREAS Deras; pt cleared to be seen by PT no TLSO required for mobility.

## 2023-04-21 NOTE — SWALLOW BEDSIDE ASSESSMENT ADULT - SLP GENERAL OBSERVATIONS
Pt appreciated asleep but easily arousable. Communicated with Cantonese  #[id number] and RN Savage. Patient with limited ability to follow directions, benefitted from visual cues. Pt breathing comfortably on RA.

## 2023-04-21 NOTE — PHYSICAL THERAPY INITIAL EVALUATION ADULT - PHYSICAL ASSIST/NONPHYSICAL ASSIST: SUPINE/SIT, REHAB EVAL
Antonio Gaytan is a 4 wk.o. male is an ex 36wga infant with:  1. Pulmonary hypertension  - multifactorial with elevated LVEDP and  with poor transition   2. Severe LV dysfunction with regional wall motion severe hypokinesis/akenesis of the lateral wall, ST elevation, and troponin elevation.   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 Severe mtiral valve regurgitation  4. Moderate tricuspid valve regurgitation  5. Moderate patent ductus arteriosus, bidirectional  6. Head US 23 with grade I interventricular hemorrhage with some surrounding changes - evolving grade I bleed with some cystic changes on 7/3/23 HUS  - stable   7. Omphalitis s/p broad spectrum antibiotics  8. 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 The prognosis is poor with most patients developing long term ventricular dysfunction and LV aneurysm and a high risk of mortality (20-30%). He is not a MCS or transplant candidate at this time due to his size, IVH, and systemic PA pressures as well as initial concern for acute enterovirus infection. Recommendation is supportive care at this point. Over the course of last week he has had increased inotropic requirement with worsening of his renal function and liver function.    Parents have requested a second opinion. [facility] heart failure team would not offer transplant or VAD due to PA pressure and patient size.     Plan:   CNS:  - Fentanyl gtt and prn  - Ativan scheduled q 4  - repeat HUS  with stable grade 1 IVH    Resp:  - Goal sat 92%, may have oxygen as needed  - Ventilate for normal gas exchange  - CPT    CV:  - MAP> 40, SBP 55 - 80  - Inotropes: milrinone 0.75 mcg/kg/min, epi 0.02 mcg/kg/min, CaCl 5   - will start Vicki today to determine if pulm htn improves   - currently DNR and not considered an ECMO/[place]/Transplant candidate here  - amiodarone drip started evening of  7/14/23 - on 10mg/kg/day  - Lasix gtt, goal even fluid balance, diruil IV q 8  - Echocardiogram today, on my review TR and MR appear slightly improved, PDA smaller but still bidirecitonal    FEN/GI:  - NPO  - TPN/IL  - Monitor electrolytes daily  - GI prophylaxis: Pepcid IV    Heme/ID:   - S/p IVIG for systemic enterovirus infection, started decadron 7/18 for myocarditis   - Goal HCT > 35, PRBCs 7/10  - ID consulted - no antivirals available for enterovirus  - Continue low dose Heparin, HUS is evolving so will not go to therapeutic heparin at this time.    Genetics:  - Microarray sent 7/10    Plastics:  - NG sump, PICC x 2, R VANNESSA bosch, good   set-up required/verbal cues/nonverbal cues (demo/gestures)/1 person assist

## 2023-04-21 NOTE — PHYSICAL THERAPY INITIAL EVALUATION ADULT - IMPAIRMENTS CONTRIBUTING TO GAIT DEVIATIONS, PT EVAL
retropulsive with ambulating backward/impaired balance/cognition/impaired coordination/impaired postural control/decreased strength

## 2023-04-21 NOTE — DIETITIAN INITIAL EVALUATION ADULT - PERTINENT MEDS FT
MEDICATIONS  (STANDING):  chlorhexidine 2% Cloths 1 Application(s) Topical <User Schedule>  dextrose 5%. 1000 milliLiter(s) (100 mL/Hr) IV Continuous <Continuous>  dextrose 50% Injectable 25 Gram(s) IV Push once  glucagon  Injectable 1 milliGRAM(s) IntraMuscular once  insulin lispro (ADMELOG) corrective regimen sliding scale   SubCutaneous every 6 hours  levETIRAcetam  IVPB 500 milliGRAM(s) IV Intermittent every 12 hours  polyethylene glycol 3350 17 Gram(s) Oral daily  senna 2 Tablet(s) Oral at bedtime  sodium chloride 0.9%. 1000 milliLiter(s) (125 mL/Hr) IV Continuous <Continuous>    MEDICATIONS  (PRN):  acetaminophen     Tablet .. 650 milliGRAM(s) Oral every 6 hours PRN Temp greater or equal to 38C (100.4F), Mild Pain (1 - 3)  dextrose Oral Gel 15 Gram(s) Oral once PRN Blood Glucose LESS THAN 70 milliGRAM(s)/deciliter

## 2023-04-21 NOTE — PROGRESS NOTE ADULT - ASSESSMENT
87 year old Cantonese speaking male with PMH of dementia (AOx2 @ baseline, ambulates w/ cane or walker), HTN, prior CVA (on Plavix), BPH presented to [place] ED after unwitnessed falls x2 w/ unknown downtime. CTH showed right frontal cortical IPH, transferred to [facility] for further workup. Stability scan completed and bleed remains stable, patient transferred from NSICU to Stroke Telemetry for further workup           Neuro     #CVA workup   - continue to hold home Plavix   - q4hr stroke neuro checks and vitals   - obtain MRI Brain without contrast to assess for underlying etiology of bleed   - HCT Results  4/20: Right frontal acute parenchymal hemorrhage   - HCT Results 4/21: Stable right frontal intraparenchymal hematoma   - CTA Results: Questionable FMD involving the distal right internal carotid artery   - Continue Keppra 500 BID   - EEG in place, likely will d/c after 24 hours, no reported seizure activity   - Pain control w/ Tylenol   - Stroke education        Cards     #HTN     - -160   - Will restart home BP meds   - obtain TTE with bubble   - EKG Results:           #Elevated troponin   -Rule out NSTEMI   - Troponin downtrending 0.05 > 0.04 > 0.03           #HLD   - LDL results: 89           Pulm   - Call provider if SPO2 < 94%   - CT PE protocol completed at [place] negative           GI   #Nutrition/Fluids/Electrolytes    - replete K<4 and Mg <2   - s/p bedside swallow eval with SLP, recommending pureed diet w/ thin liquids   - IVF: NS @ 100cc/hr   - CT abd/pelvis: Cholelitiasis w/ bladder wall thickening   - Obtain abdominal US to follow up           Renal   - NS @ 100cc/hr, s/p 150cc 3% bolus   - Patient with unknown down time, CK elevated c/f rhabdo   - Continue to trend CK           Infectious Disease   - Stroke Code CXR results:        Endocrine   - A1C results: 5.1     - TSH results: 1.76     DVT Prophylaxis   - Holding lovenox sq  - SCDs for DVT prophylaxis            IDR Goals: Goals reviewed at interdisciplinary rounds with case management, social work, physical therapy, occupational therapy, and speech language pathology.      Please see specific therapy  notes for in depth goals.           Dispo: Acute rehab per PT, pending OT eval           Discussed daily hospital plans and goals with patient and family at bedside.            Discussed with Neurology Attending Dr. Monico Martines  87 year old Cantonese speaking male with PMH of dementia (AOx2 @ baseline, ambulates w/ cane or walker), HTN, prior CVA (on Plavix), BPH presented to [place] ED after unwitnessed falls x2 w/ unknown downtime. CTH showed right frontal cortical IPH, transferred to [facility] for further workup. Stability scan completed and bleed remains stable, patient transferred from NSICU to Stroke Telemetry for further workup           Neuro     #CVA workup   - continue to hold home Plavix   - q4hr stroke neuro checks and vitals   - obtain MRI Brain without contrast to assess for underlying etiology of bleed   - HCT Results  4/20: Right frontal acute parenchymal hemorrhage   - HCT Results 4/21: Stable right frontal intraparenchymal hematoma   - CTA Results: Questionable FMD involving the distal right internal carotid artery   - Continue Keppra 500 BID   - EEG in place, likely will d/c after 24 hours, no reported seizure activity   - Pain control w/ Tylenol   - Stroke education        Cards     #HTN     - -160   - Restarted home hydralazine 25mg daily  - Added lisinopril 10mg today after BP still > 160  - TTE with bubble: Normal LV systolic function. No PFO.     #Elevated troponin   -Rule out NSTEMI   -Troponin downtrending 0.05 > 0.04 > 0.03     #HLD   - LDL results: 89   - Holding statin in setting of IPH    Pulm   - Call provider if SPO2 < 94%   - CT PE protocol completed at [place] negative         GI   #Nutrition/Fluids/Electrolytes    - replete K<4 and Mg <2   - s/p bedside swallow eval with SLP, recommending pureed diet w/ thin liquids   - IVF: NS @ 100cc/hr     #Gallbladder wall thickening  - Patient asymptomatic, negative burns's sign  - CT abd/pelvis: Cholelitiasis w/ bladder wall thickening   - abdominal US w/ possible cholecystitis  - HIDA scan pending          Renal   - NS @ 100cc/hr, s/p 150cc 3% bolus   - Patient with unknown down time, CK elevated c/f rhabdo   - Continue to trend CK           Infectious Disease   - Afebrile  - Continue to monitor for leukocytosis    Endocrine   - A1C results: 5.1     - TSH results: 1.76     DVT Prophylaxis   - Holding lovenox sq  - SCDs for DVT prophylaxis            IDR Goals: Goals reviewed at interdisciplinary rounds with case management, social work, physical therapy, occupational therapy, and speech language pathology.      Please see specific therapy  notes for in depth goals.           Dispo: Acute rehab per PT, pending OT eval           Discussed daily hospital plans and goals with patient and family at bedside.            Discussed with Neurology Attending Dr. Monico Martines

## 2023-04-21 NOTE — PHYSICAL THERAPY INITIAL EVALUATION ADULT - GAIT DEVIATIONS NOTED, PT EVAL
narrow ROBI, shuffled gait/decreased rommel/decreased step length/decreased stride length/decreased weight-shifting ability

## 2023-04-21 NOTE — OCCUPATIONAL THERAPY INITIAL EVALUATION ADULT - GENERAL OBSERVATIONS, REHAB EVAL
OT IE completed. MRS 4. Orders received, chart reviewed, pt cleared for OT by RICHY Jackson. Pt received semi supine in bed, NAD, +VEEG, +crani wrap C/D/I, +heplock, +tele, +[place]. Pt A&Ox2 (person, place), agreeable to OT, and tolerated session well.

## 2023-04-21 NOTE — SWALLOW BEDSIDE ASSESSMENT ADULT - MODE OF PRESENTATION
Thin liquid x3 tsp, x6 cup sip, puree x5, soft and bite size x2/cup/spoon/fed by clinician Attending Attestation (For Attendings USE Only)...

## 2023-04-21 NOTE — PHYSICAL THERAPY INITIAL EVALUATION ADULT - MODALITIES TREATMENT COMMENTS
Gross neuro exam performed 2/2 decreased command following and unreliable historian. Cranial Nerves II - XII: II: Pt reports he can see therapist clearly. III, IV, VI: Pt able to track to all visual fields although inconsistent. Vision Quadrant Test: NT V: facial sensation intact to light touch V1-V3 b/l VII: no ptosis, no facial droop; symmetric eyebrow raise and closure VIII: hearing intact to finger rub b/l  XI: head turning intact; unable to follow command for shoulder strong b/l XII: tongue protrusion midline, no deviation.

## 2023-04-21 NOTE — PHYSICAL THERAPY INITIAL EVALUATION ADULT - PERTINENT HX OF CURRENT PROBLEM, REHAB EVAL
86 y/o Cantonese speaking male with PMH of dementia (oriented x 2 at baseline, typically ambulates with cane or walker), HTN, prior CVA- on plavix. Last taken on 4/19. BPH. He presents with altered mental status in the setting of 2 falls. Per ED records, patient's daughters stated that he had recent insomnia. Subsequently, trazodone increased from 50 mg to 100 mg before bedtime 2 days ago. Pt had 2 falls today, both unwitnessed, unknown downtime. Daughter states patient had urinary incontinence. He presented to [place] ED by EMS. 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 He was started on Cardene and given Keppra. Last dose of Plavix was 4/19 and with possible NSTEMI, plavix not reversed. Currently denies chest pain, palpitations shortness of breath. Also had trauma imaging including CT Cspine/ chest/abd/pelvis; found to have age indeterminate T5 and L5 compression fx. Transferred to [facility] for further management. ICH score 1, NIHSS 1. GCS 13.

## 2023-04-21 NOTE — PHYSICAL THERAPY INITIAL EVALUATION ADULT - GENERAL OBSERVATIONS, REHAB EVAL
PT IE Completed. MRS:4. Pt received semi-supine in bed, Cantonese speaking, +tele, A&Ox4, +VEEG, +RA,+heplock, in NAD and agreeable to work with PT, RICHY Jackson notified.Pt presents to [facility] with AMS in setting of 2 falls; found to have IPH, T5 and L5 age indeterminate compression fx. PT exam shows impaired cognition, following ~50% of simple commands, gross neuro and MSK assessment WNL, impaired gait and balance. Pt completed bed mob, transfers and gait; 2 person assist for OOB mobility. Pt left as found, +bed alarm, +call davis within reach, RICHY Jackson notified. Pt can benefit from PT in order to improve quality of life by increasing strength/endurance/balance with functional mobility and ADLS.

## 2023-04-21 NOTE — PROGRESS NOTE ADULT - SUBJECTIVE AND OBJECTIVE BOX
------------------------TRANSFER NOTE FROM NSICU TO STROKE TELEMETRY----------------------------                 HPI: 87y Male with PMHx of Catonese speaking male with PMH of dementia (oriented x 2 at baseline, typically ambulates with cane or walker), HTN, prior CVA- on plavix. Last taken on . BPH. He presents with altered mental status in the setting of 2 falls. Per ED records, patient's daughters stated that he had recent insomnia. Subsequently, trazodone increased from 50 mg to 100 mg before bedtime 2 days ago. Pt had 2 falls today, both unwitnessed, unknown downtime. Daughter states patient had urinary incontinence. He presented to [place] ED by EMS. 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 Transferred to [facility] for further management. ICH score 1, NIHSS 1. GCS 13. He was started on Cardene and given Keppra. Last dose of Plavix was  and with possible NSTEMI, plavix not reversed, troponin now downtrending. Currently denies chest pain, palpitations shortness of breath. Also had trauma imaging including CT Cspine/ chest/abd/pelvis, shows compression fractures of T5 and L5 and cholelithiasis with focal gallbladder wall thickening. 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 Patient now off cardene, with stable scan able to be transferred from NSICU to Stroke/Telemetry for further workup.            T(C): 36.4 (23 @ 09:15), Max: 37.6 (23 @ 21:18)     HR: 77 (23 @ 12:19) (54 - 77)     BP: 160/68 (23 @ 12:19) (131/60 - 173/72)     RR: 22 (23 @ 12:19) (13 - 28)     SpO2: 99% (23 @ 12:19) (96% - 99%)           PAST MEDICAL & SURGICAL HISTORY:     BPH (benign prostatic hyperplasia)                 Irregular heart rate                 Lumbar disc herniation                 Hypertension                 Stroke                 No significant past surgical history                             FAMILY HISTORY:     No pertinent family history in first degree relatives                       SOCIAL HISTORY:      Patient lives with at home.      Smoking status: Unable to assess     Drinking: Unable to assess     Drug Use: Unable to assess           ROS:      Constitutional: No fever, weight loss or fatigue     Eyes: No eye pain, visual disturbances, or discharge     ENMT:  No difficulty hearing, tinnitus; No sinus or throat pain     Neck: No pain or stiffness     Respiratory: No cough, wheezing, chills or hemoptysis     Cardiovascular: No chest pain, palpitations, shortness of breath, or leg swelling     Gastrointestinal: No abdominal pain. No nausea, vomiting or hematemesis; No diarrhea or constipation. Nohematochezia.     Genitourinary: No dysuria, frequency, hematuria or incontinence     Neurological: As per HPI     Skin: No itching, burning, rashes or lesions      Endocrine: No heat or cold intolerance; No hair loss     Musculoskeletal: No joint pain or swelling; No muscle, back or extremity pain     Heme/Lymph: No easy bruising or bleeding gums           MEDICATIONS  (STANDING):     chlorhexidine 2% Cloths 1 Application(s) Topical <User Schedule>     dextrose 5%. 1000 milliLiter(s) (100 mL/Hr) IV Continuous <Continuous>     dextrose 50% Injectable 25 Gram(s) IV Push once     glucagon  Injectable 1 milliGRAM(s) IntraMuscular once     insulin lispro (ADMELOG) corrective regimen sliding scale   SubCutaneous every 6 hours     levETIRAcetam  IVPB 500 milliGRAM(s) IV Intermittent every 12 hours     polyethylene glycol 3350 17 Gram(s) Oral daily     senna 2 Tablet(s) Oral at bedtime     sodium chloride 0.9%. 1000 milliLiter(s) (125 mL/Hr) IV Continuous <Continuous>           MEDICATIONS  (PRN):     acetaminophen     Tablet .. 650 milliGRAM(s) Oral every 6 hours PRN Temp greater or equal to 38C (100.4F), Mild Pain (1 - 3)     dextrose Oral Gel 15 Gram(s) Oral once PRN Blood Glucose LESS THAN 70 milliGRAM(s)/deciliter           Allergies           No Known Allergies           Intolerances                 Vital Signs Last 24 Hrs     T(C): 36.4 (2023 09:15), Max: 37.6 (2023 21:18)     T(F): 97.5 (2023 09:15), Max: 99.7 (2023 21:18)     HR: 77 (2023 12:19) (54 - 77)     BP: 160/68 (2023 12:19) (131/60 - 173/72)     BP(mean): 98 (2023 12:19) (86 - 106)     RR: 22 (2023 12:19) (13 - 28)     SpO2: 99% (2023 12:19) (95% - 99%)           Parameters below as of 2023 12:19     Patient On (Oxygen Delivery Method): room air                       Physical exam:     Constitutional: No acute distress, conversant     Eyes: Anicteric sclerae, moist conjunctivae, see below for CNs     Neck: trachea midline     Cardiovascular: Regular rate and rhythm on monitor     Pulmonary: No use of accessory muscles     GI: Abdomen soft     Extremities: no edema           Neurologic:     -Mental status: Awake, alert, oriented to person, not place or time. Speech is fluent. Recent and remote memory not intact. Difficult to assess mentation as patient is not responding to questions when asked with  or in English at times. Smiles and attends to examiner, able to have simple conversation. Follows some simple commands. Inattentive at times. Unclear if patient has any aphasia or he has difficulty understanding  instructions or d/t underlying dementia.     -Cranial nerves:      II: Blink to threat intact.     III, IV, VI: Extraocular movements are grossly intact without nystagmus. Pupils equally round and reactive to light     V:  Unable to assess facial sensation     VII: Face is symmetric with normal smile     Motor: Normal bulk and tone. Difficult to assess strength exam d/t inattentiveness.  Strength bilateral upper extremity at least  3+/5, bilateral lower extremities at least 4/5, no drift.     Sensation: Unable to test sensation or DSST.     Coordination: Unable to test for dysmetria                 NIHSS: 5           Fingerstick Blood Glucose: CAPILLARY BLOOD GLUCOSE                 POCT Blood Glucose.: 105 mg/dL (2023 10:46)           LABS:                              10.8      9.12  )-----------( 141      ( 2023 04:07 )                32.4            04-21           138  |  104  |  12     ----------------------------<  101<H>     3.5   |  25  |  0.71           Ca    8.1<L>      2023 04:07     Phos  2.7     04-21     Mg     2.1     04-21           TPro  6.8  /  Alb  4.1  /  TBili  0.7  /  DBili  x   /  AST  46<H>  /  ALT  20  /  AlkPhos  52  04-20           PT/INR - ( 2023 21:57 )   PT: 12.7 sec;   INR: 1.07                    PTT - ( 2023 21:57 )  PTT:27.9 sec     CARDIAC MARKERS ( 2023 10:45 )     x     / 0.03 ng/mL / 1455 U/L / x     / x         CARDIAC MARKERS ( 2023 04:07 )     x     / 0.04 ng/mL / 1526 U/L / x     / x         CARDIAC MARKERS ( 2023 21:57 )     x     / 0.05 ng/mL / 1079 U/L / x     / 10.9 ng/mL     CARDIAC MARKERS ( 2023 18:00 )     x     / x     / 900 U/L / x     / x                     Urinalysis Basic - ( 2023 18:00 )           Color: Yellow / Appearance: Clear / S.015 / pH: x     Gluc: x / Ketone: Negative  / Bili: Negative / Urobili: Negative      Blood: x / Protein: 30 mg/dL / Nitrite: Negative      Leuk Esterase: Negative / RBC: 10-25 /HPF / WBC 3-5 /HPF      Sq Epi: x / Non Sq Epi: x / Bacteria: Trace /HPF                       RADIOLOGY & ADDITIONAL STUDIES:           < from: CT Head No Cont (23 @ 19:37) >     1.  CT HEAD:    Right frontal acute parenchymal hemorrhage           2.  CT CERVICAL:   No cervical vertebral fracture.           < from: CT Head No Cont (23 @ 01:35) >     Impression:           Stable right frontal intraparenchymal hematoma..           < from: CT Angio Neck w/ IV Cont (23 @ 01:09) >     IMPRESSION: Questionable FMD involving the distal right internal carotid      artery.           < from: CT Abdomen and Pelvis w/ IV Cont (23 @ 19:46) >     IMPRESSION: No evidence of acute traumatic injury.     Age indeterminate compression fractures of T5 and L5.     Cholelithiasis with focal gallbladder wall thickening. Recommend      nonemergent gallbladder ultrasound     Markedly enlarged prostate gland

## 2023-04-21 NOTE — OCCUPATIONAL THERAPY INITIAL EVALUATION ADULT - MANUAL MUSCLE TESTING RESULTS, REHAB EVAL
Unable to formally assess MMT 2/2 poor command following. Grossly assessed during functional mobility against gravity with pt presenting with 3+/5 or greater BUE/BLE strength.

## 2023-04-21 NOTE — DIETITIAN INITIAL EVALUATION ADULT - OTHER CALCULATIONS
ABW used for calculations as pt between % of IBW. (88%). Needs adjusted for advanced age, possible pre/post op nutrition optimization. **Adjust fluids per team

## 2023-04-21 NOTE — SWALLOW BEDSIDE ASSESSMENT ADULT - PHARYNGEAL PHASE
Laryngeal movement appeared brisk per palpation. No cough, throat clear, or change in vocal quality post swallow.

## 2023-04-21 NOTE — OCCUPATIONAL THERAPY INITIAL EVALUATION ADULT - STANDING BALANCE: DYNAMIC, REHAB EVAL
Anesthesia Post Evaluation    Patient: Brandan Werner    Procedure(s) Performed: Procedure(s) (LRB):  COLONOSCOPY (N/A)    Final Anesthesia Type: general  Patient location during evaluation: GI PACU  Patient participation: Yes- Able to Participate  Level of consciousness: awake and alert, awake and oriented  Post-procedure vital signs: reviewed and stable  Pain management: adequate  Airway patency: patent  PONV status at discharge: No PONV  Anesthetic complications: no      Cardiovascular status: blood pressure returned to baseline, stable and hemodynamically stable  Respiratory status: unassisted, spontaneous ventilation and room air  Hydration status: euvolemic  Follow-up not needed.        Visit Vitals  /76 (BP Location: Left arm, Patient Position: Sitting)   Pulse 82   Temp 37.1 °C (98.7 °F) (Oral)   Resp 12   Ht 6' (1.829 m)   Wt 120.2 kg (265 lb)   SpO2 98%   BMI 35.94 kg/m²       Pain/Levi Score: Pain Assessment Performed: Yes (11/26/2018 11:10 AM)  Presence of Pain: denies (11/26/2018 11:10 AM)  Pain Rating Prior to Med Admin: 0 (11/26/2018 11:10 AM)  Levi Score: 10 (11/26/2018 11:10 AM)         poor balance

## 2023-04-21 NOTE — PHYSICAL THERAPY INITIAL EVALUATION ADULT - MANUAL MUSCLE TESTING RESULTS, REHAB EVAL
Cognition; UE/LE strength grossly assessed with functional mobility. Bilaterally pt strength is equal/greater than 3/5./grossly assessed due to

## 2023-04-21 NOTE — SWALLOW BEDSIDE ASSESSMENT ADULT - SWALLOW EVAL: DIAGNOSIS
Pt presents with mild oral dysphagia i/s/o AMS. Pharyngeal swallow judged to be WFL. Recommend initiate modified diet with 1:1 feeding assistance.

## 2023-04-21 NOTE — OCCUPATIONAL THERAPY INITIAL EVALUATION ADULT - MODALITIES TREATMENT COMMENTS
Gross neuro exam performed 2/2 decreased command following and unreliable historian. Cranial Nerves II - XII: II: Pt reports he can see therapist clearly. III, IV, VI: Pt able to track to all visual fields although inconsistent. Vision Quadrant Test: NT V: facial sensation intact to light touch V1-V3 b/l VII: no ptosis, no facial droop; symmetric eyebrow raise and closure VIII: hearing intact to finger rub b/l  XI: head turning intact; unable to follow command for shoulder strong b/l XII: tongue protrusion midline, no deviation. // pt able to ambulate ~10ft in room with Min Ax2 (b/l hand held assist) demo dystaxic gait, decreased BLE coordination, decreased step length, and narrow gait, contributing to overall balance impairment.

## 2023-04-21 NOTE — OCCUPATIONAL THERAPY INITIAL EVALUATION ADULT - FINE MOTOR COORDINATION EXAM
Unable to formally assess 2/2 impaired cognition with decreased command following. Pt demo mildly impaired cognition t/o BUE/BLE, with resting BUE tremor observed throughout session

## 2023-04-21 NOTE — OCCUPATIONAL THERAPY INITIAL EVALUATION ADULT - DIAGNOSIS, OT EVAL
Pt presenting with impaired cognition, decreased strength, impaired postural control, impaired motor control/coordination, decreased balance, and decreased functional activity tolerance, impacting ability to perform functional mobility/ADLs.

## 2023-04-21 NOTE — DIETITIAN INITIAL EVALUATION ADULT - PERTINENT LABORATORY DATA
04-21    138  |  104  |  12  ----------------------------<  101<H>  3.5   |  25  |  0.71    Ca    8.1<L>      21 Apr 2023 04:07  Phos  2.7     04-21  Mg     2.1     04-21    TPro  6.8  /  Alb  4.1  /  TBili  0.7  /  DBili  x   /  AST  46<H>  /  ALT  20  /  AlkPhos  52  04-20  POCT Blood Glucose.: 105 mg/dL (04-21-23 @ 10:46)  A1C with Estimated Average Glucose Result: 5.1 % (04-20-23 @ 21:57)

## 2023-04-21 NOTE — PHYSICAL THERAPY INITIAL EVALUATION ADULT - IMPAIRMENTS FOUND, PT EVAL
aerobic capacity/endurance/arousal, attention, and cognition/gait, locomotion, and balance/muscle strength/neuromotor development and sensory integration/posture

## 2023-04-22 LAB
ANION GAP SERPL CALC-SCNC: 10 MMOL/L — SIGNIFICANT CHANGE UP (ref 5–17)
BASOPHILS # BLD AUTO: 0.03 K/UL — SIGNIFICANT CHANGE UP (ref 0–0.2)
BASOPHILS NFR BLD AUTO: 0.4 % — SIGNIFICANT CHANGE UP (ref 0–2)
BUN SERPL-MCNC: 10 MG/DL — SIGNIFICANT CHANGE UP (ref 7–23)
CALCIUM SERPL-MCNC: 7.8 MG/DL — LOW (ref 8.4–10.5)
CHLORIDE SERPL-SCNC: 105 MMOL/L — SIGNIFICANT CHANGE UP (ref 96–108)
CK SERPL-CCNC: 1092 U/L — HIGH (ref 30–200)
CO2 SERPL-SCNC: 22 MMOL/L — SIGNIFICANT CHANGE UP (ref 22–31)
CREAT SERPL-MCNC: 0.75 MG/DL — SIGNIFICANT CHANGE UP (ref 0.5–1.3)
EGFR: 87 ML/MIN/1.73M2 — SIGNIFICANT CHANGE UP
EOSINOPHIL # BLD AUTO: 0.01 K/UL — SIGNIFICANT CHANGE UP (ref 0–0.5)
EOSINOPHIL NFR BLD AUTO: 0.1 % — SIGNIFICANT CHANGE UP (ref 0–6)
GLUCOSE SERPL-MCNC: 143 MG/DL — HIGH (ref 70–99)
HCT VFR BLD CALC: 32.4 % — LOW (ref 39–50)
HGB BLD-MCNC: 10.8 G/DL — LOW (ref 13–17)
IMM GRANULOCYTES NFR BLD AUTO: 0.5 % — SIGNIFICANT CHANGE UP (ref 0–0.9)
LYMPHOCYTES # BLD AUTO: 0.67 K/UL — LOW (ref 1–3.3)
LYMPHOCYTES # BLD AUTO: 8.8 % — LOW (ref 13–44)
MAGNESIUM SERPL-MCNC: 2 MG/DL — SIGNIFICANT CHANGE UP (ref 1.6–2.6)
MCHC RBC-ENTMCNC: 32.8 PG — SIGNIFICANT CHANGE UP (ref 27–34)
MCHC RBC-ENTMCNC: 33.3 GM/DL — SIGNIFICANT CHANGE UP (ref 32–36)
MCV RBC AUTO: 98.5 FL — SIGNIFICANT CHANGE UP (ref 80–100)
MONOCYTES # BLD AUTO: 0.62 K/UL — SIGNIFICANT CHANGE UP (ref 0–0.9)
MONOCYTES NFR BLD AUTO: 8.1 % — SIGNIFICANT CHANGE UP (ref 2–14)
NEUTROPHILS # BLD AUTO: 6.24 K/UL — SIGNIFICANT CHANGE UP (ref 1.8–7.4)
NEUTROPHILS NFR BLD AUTO: 82.1 % — HIGH (ref 43–77)
NRBC # BLD: 0 /100 WBCS — SIGNIFICANT CHANGE UP (ref 0–0)
PLATELET # BLD AUTO: 148 K/UL — LOW (ref 150–400)
POTASSIUM SERPL-MCNC: 3.2 MMOL/L — LOW (ref 3.5–5.3)
POTASSIUM SERPL-SCNC: 3.2 MMOL/L — LOW (ref 3.5–5.3)
RBC # BLD: 3.29 M/UL — LOW (ref 4.2–5.8)
RBC # FLD: 12.5 % — SIGNIFICANT CHANGE UP (ref 10.3–14.5)
SODIUM SERPL-SCNC: 137 MMOL/L — SIGNIFICANT CHANGE UP (ref 135–145)
WBC # BLD: 7.61 K/UL — SIGNIFICANT CHANGE UP (ref 3.8–10.5)
WBC # FLD AUTO: 7.61 K/UL — SIGNIFICANT CHANGE UP (ref 3.8–10.5)

## 2023-04-22 PROCEDURE — 95720 EEG PHY/QHP EA INCR W/VEEG: CPT

## 2023-04-22 PROCEDURE — 99222 1ST HOSP IP/OBS MODERATE 55: CPT

## 2023-04-22 RX ORDER — HYDRALAZINE HCL 50 MG
10 TABLET ORAL ONCE
Refills: 0 | Status: COMPLETED | OUTPATIENT
Start: 2023-04-22 | End: 2023-04-22

## 2023-04-22 RX ORDER — HYDRALAZINE HCL 50 MG
50 TABLET ORAL EVERY 8 HOURS
Refills: 0 | Status: DISCONTINUED | OUTPATIENT
Start: 2023-04-22 | End: 2023-04-23

## 2023-04-22 RX ORDER — POTASSIUM CHLORIDE 20 MEQ
40 PACKET (EA) ORAL ONCE
Refills: 0 | Status: COMPLETED | OUTPATIENT
Start: 2023-04-22 | End: 2023-04-22

## 2023-04-22 RX ORDER — ENOXAPARIN SODIUM 100 MG/ML
40 INJECTION SUBCUTANEOUS EVERY 24 HOURS
Refills: 0 | Status: DISCONTINUED | OUTPATIENT
Start: 2023-04-22 | End: 2023-04-28

## 2023-04-22 RX ORDER — HYDRALAZINE HCL 50 MG
5 TABLET ORAL ONCE
Refills: 0 | Status: DISCONTINUED | OUTPATIENT
Start: 2023-04-22 | End: 2023-04-22

## 2023-04-22 RX ORDER — HYDRALAZINE HCL 50 MG
25 TABLET ORAL ONCE
Refills: 0 | Status: COMPLETED | OUTPATIENT
Start: 2023-04-22 | End: 2023-04-22

## 2023-04-22 RX ORDER — LABETALOL HCL 100 MG
10 TABLET ORAL ONCE
Refills: 0 | Status: COMPLETED | OUTPATIENT
Start: 2023-04-22 | End: 2023-04-22

## 2023-04-22 RX ADMIN — Medication 10 MILLIGRAM(S): at 17:10

## 2023-04-22 RX ADMIN — Medication 10 MILLIGRAM(S): at 18:03

## 2023-04-22 RX ADMIN — Medication 40 MILLIEQUIVALENT(S): at 09:10

## 2023-04-22 RX ADMIN — Medication 25 MILLIGRAM(S): at 18:16

## 2023-04-22 RX ADMIN — FINASTERIDE 5 MILLIGRAM(S): 5 TABLET, FILM COATED ORAL at 12:36

## 2023-04-22 RX ADMIN — Medication 50 MILLIGRAM(S): at 22:22

## 2023-04-22 RX ADMIN — LEVETIRACETAM 400 MILLIGRAM(S): 250 TABLET, FILM COATED ORAL at 06:42

## 2023-04-22 RX ADMIN — TAMSULOSIN HYDROCHLORIDE 0.4 MILLIGRAM(S): 0.4 CAPSULE ORAL at 22:22

## 2023-04-22 RX ADMIN — Medication 25 MILLIGRAM(S): at 04:25

## 2023-04-22 RX ADMIN — DONEPEZIL HYDROCHLORIDE 5 MILLIGRAM(S): 10 TABLET, FILM COATED ORAL at 22:54

## 2023-04-22 RX ADMIN — LISINOPRIL 10 MILLIGRAM(S): 2.5 TABLET ORAL at 04:24

## 2023-04-22 RX ADMIN — ENOXAPARIN SODIUM 40 MILLIGRAM(S): 100 INJECTION SUBCUTANEOUS at 18:04

## 2023-04-22 RX ADMIN — Medication 50 MILLIGRAM(S): at 14:44

## 2023-04-22 RX ADMIN — SENNA PLUS 2 TABLET(S): 8.6 TABLET ORAL at 22:21

## 2023-04-22 RX ADMIN — LEVETIRACETAM 400 MILLIGRAM(S): 250 TABLET, FILM COATED ORAL at 18:07

## 2023-04-22 RX ADMIN — Medication 25 MILLIGRAM(S): at 09:44

## 2023-04-22 RX ADMIN — Medication 25 MILLIGRAM(S): at 04:24

## 2023-04-22 NOTE — PROGRESS NOTE ADULT - SUBJECTIVE AND OBJECTIVE BOX
Neurology Stroke Progress Note    INTERVAL HPI/OVERNIGHT EVENTS:  Patient seen and examined @ bedside.  number # [id number] used. Pt resting comfortably, denies any HA/ any complaints. responds approporiately to the questions asked via the . Alert,awake, oriented to self, not to the time (month/year)    MEDICATIONS  (STANDING):  chlorhexidine 2% Cloths 1 Application(s) Topical <User Schedule>  donepezil 5 milliGRAM(s) Oral at bedtime  finasteride 5 milliGRAM(s) Oral daily  hydrALAZINE 50 milliGRAM(s) Oral every 8 hours  levETIRAcetam  IVPB 500 milliGRAM(s) IV Intermittent every 12 hours  lisinopril 10 milliGRAM(s) Oral daily  metoprolol tartrate 25 milliGRAM(s) Oral two times a day  polyethylene glycol 3350 17 Gram(s) Oral daily  senna 2 Tablet(s) Oral at bedtime  tamsulosin 0.4 milliGRAM(s) Oral at bedtime    MEDICATIONS  (PRN):  acetaminophen     Tablet .. 650 milliGRAM(s) Oral every 6 hours PRN Temp greater or equal to 38C (100.4F), Mild Pain (1 - 3)      Allergies    No Known Allergies    Intolerances        Vital Signs Last 24 Hrs  T(C): 36.9 (2023 09:19), Max: 37.2 (2023 18:06)  T(F): 98.4 (2023 09:19), Max: 98.9 (2023 18:06)  HR: 94 (2023 11:44) (66 - 94)  BP: 149/77 (2023 11:44) (139/67 - 191/86)  BP(mean): 106 (2023 11:44) (96 - 129)  RR: 20 (2023 11:44) (11 - 20)  SpO2: 97% (2023 11:44) (97% - 98%)    Parameters below as of 2023 11:44  Patient On (Oxygen Delivery Method): room air        Physical exam:  General: No acute distress, awake and alert  Eyes: Anicteric sclerae, moist conjunctivae, see below for CNs  Neck: trachea midline, FROM, supple, no thyromegaly or lymphadenopathy  Cardiovascular: Regular rate and rhythm, no murmurs, rubs, or gallops. No carotid bruits.   Pulmonary: Anterior breath sounds clear bilaterally, no crackles or wheezing. No use of accessory muscles  GI: Abdomen soft, non-distended, non-tender  Extremities: Radial and DP pulses +2, no edema    Neurologic:  -Mental status: Awake, alert, oriented to person, place, and time. Speech is fluent with intact naming, repetition, and comprehension, no dysarthria. Recent and remote memory intact. Follows commands. Attention/concentration intact. Fund of knowledge appropriate.  -Cranial nerves:   II: Visual fields are full to confrontation.  III, IV, VI: Extraocular movements are intact without nystagmus. Pupils equally round and reactive to light  V:  Facial sensation V1-V3 equal and intact   VII: Face is symmetric with normal eye closure and smile  VIII: Hearing is bilaterally intact to finger rub  IX, X: Uvula is midline and soft palate rises symmetrically  XI: Head turning and shoulder shrug are intact.  XII: Tongue protrudes midline  Motor: Normal bulk and tone. No pronator drift. Strength bilateral upper extremity 5/5, bilateral lower extremities 5/5.  Rapid alternating movements intact and symmetric  Sensation: Intact to light touch bilaterally. No neglect or extinction on double simultaneous testing.  Coordination: No dysmetria on finger-to-nose and heel-to-shin bilaterally  Reflexes: Downgoing toes bilaterally   Gait: Narrow gait and steady    LABS:                        10.8   7.61  )-----------( 148      ( 2023 05:30 )             32.4     04-22    137  |  105  |  10  ----------------------------<  143<H>  3.2<L>   |  22  |  0.75    Ca    7.8<L>      2023 05:30  Phos  2.7     04-21  Mg     2.0     04-    TPro  6.8  /  Alb  4.1  /  TBili  0.7  /  DBili  x   /  AST  46<H>  /  ALT  20  /  AlkPhos  52  04-20    PT/INR - ( 2023 21:57 )   PT: 12.7 sec;   INR: 1.07          PTT - ( 2023 21:57 )  PTT:27.9 sec  Urinalysis Basic - ( 2023 18:00 )    Color: Yellow / Appearance: Clear / S.015 / pH: x  Gluc: x / Ketone: Negative  / Bili: Negative / Urobili: Negative   Blood: x / Protein: 30 mg/dL / Nitrite: Negative   Leuk Esterase: Negative / RBC: 10-25 /HPF / WBC 3-5 /HPF   Sq Epi: x / Non Sq Epi: x / Bacteria: Trace /HPF        RADIOLOGY & ADDITIONAL TESTS:     Neurology Stroke Progress Note    INTERVAL HPI/OVERNIGHT EVENTS:  Patient seen and examined @ bedside.  number # [id number] used. Pt resting comfortably, denies any HA/ any complaints. Responds appropriately to the questions asked via the . Alert,awake, oriented to self, not to the time (month/year), oriented to situation inconsistently, when given choices, states he was told by his family that he fell and has blood in his brain. HIDA scan D/C'd as pt is asymptomatic no C/O abd pain/N/V. Abd soft/NT/ND, afebrile, LFTs WNL. Will recommend outpt F/U given U/S reccs.  Pt noted to be in Afib on the monitor. In retrospect, per chart review, pt has Hx of afib and D/W family, they are aware of afib and is not on any AC, only on plavix per family. Per chart review, was on Eliquis in  and was seen by Neuro for cerebellar bleed, eliquis was ? held for 4 weeks, unclear F/U. Will C/W Metoprolol and no AC @ this point of time 2/ frontal IPH.    O/N : BP persistently >170's despite adding lisinopril 10mg po daily to his home med hydralazine 25mg po Q8Hrs. Increased hydral to 50mg po Q8hrs and gave additional 25mg PO @ 9am with subsequent decrease in BPs to 130's.    MEDICATIONS  (STANDING):  chlorhexidine 2% Cloths 1 Application(s) Topical <User Schedule>  donepezil 5 milliGRAM(s) Oral at bedtime  finasteride 5 milliGRAM(s) Oral daily  hydrALAZINE 50 milliGRAM(s) Oral every 8 hours  levETIRAcetam  IVPB 500 milliGRAM(s) IV Intermittent every 12 hours  lisinopril 10 milliGRAM(s) Oral daily  metoprolol tartrate 25 milliGRAM(s) Oral two times a day  polyethylene glycol 3350 17 Gram(s) Oral daily  senna 2 Tablet(s) Oral at bedtime  tamsulosin 0.4 milliGRAM(s) Oral at bedtime    MEDICATIONS  (PRN):  acetaminophen     Tablet .. 650 milliGRAM(s) Oral every 6 hours PRN Temp greater or equal to 38C (100.4F), Mild Pain (1 - 3)      Allergies  No Known Allergies      Vital Signs Last 24 Hrs  T(C): 36.9 (2023 09:19), Max: 37.2 (2023 18:06)  T(F): 98.4 (2023 09:19), Max: 98.9 (2023 18:06)  HR: 94 (2023 11:44) (66 - 94)  BP: 149/77 (2023 11:44) (139/67 - 191/86)  BP(mean): 106 (2023 11:44) (96 - 129)  RR: 20 (2023 11:44) (11 - 20)  SpO2: 97% (2023 11:44) (97% - 98%)    Parameters below as of 2023 11:44  Patient On (Oxygen Delivery Method): room air        Physical exam:  General: No acute distress, awake and alert  Eyes: Anicteric sclerae, moist conjunctivae, see below for CNs  Neck: trachea midline, FROM.   Cardiovascular: Irregular (Afib @ times).  Pulmonary: Anterior breath sounds clear bilaterally, no crackles or wheezing. No use of accessory muscles  GI: Abdomen soft, non-distended, non-tender  Extremities: Radial and DP pulses +2, no edema    Neurologic:  -Mental status: Awake, alert, oriented to person, place, and time. Speech is fluent with intact naming, repetition, and comprehension, no dysarthria. Recent and remote memory intact. Follows commands. Attention/concentration intact. Fund of knowledge appropriate.  -Cranial nerves:   II: Visual fields are full to confrontation.  III, IV, VI: Extraocular movements are intact without nystagmus. Pupils equally round and reactive to light  V:  Facial sensation V1-V3 equal and intact   VII: Face is symmetric with normal eye closure and smile  VIII: Hearing is bilaterally intact to finger rub  IX, X: Uvula is midline and soft palate rises symmetrically  XI: Head turning and shoulder shrug are intact.  XII: Tongue protrudes midline  Motor: Normal bulk and tone. No pronator drift. Strength bilateral upper extremity 5/5, bilateral lower extremities 5/5.  Rapid alternating movements intact and symmetric  Sensation: Intact to light touch bilaterally. No neglect or extinction on double simultaneous testing.  Coordination: No dysmetria on finger-to-nose and heel-to-shin bilaterally  Reflexes: Downgoing toes bilaterally   Gait: Narrow gait and steady    LABS:                        10.8   7.61  )-----------( 148      ( 2023 05:30 )             32.4     04-22    137  |  105  |  10  ----------------------------<  143<H>  3.2<L>   |  22  |  0.75    Ca    7.8<L>      2023 05:30  Phos  2.7     04-  Mg     2.0     -    TPro  6.8  /  Alb  4.1  /  TBili  0.7  /  DBili  x   /  AST  46<H>  /  ALT  20  /  AlkPhos  52  04-20    PT/INR - ( 2023 21:57 )   PT: 12.7 sec;   INR: 1.07          PTT - ( 2023 21:57 )  PTT:27.9 sec  Urinalysis Basic - ( 2023 18:00 )    Color: Yellow / Appearance: Clear / S.015 / pH: x  Gluc: x / Ketone: Negative  / Bili: Negative / Urobili: Negative   Blood: x / Protein: 30 mg/dL / Nitrite: Negative   Leuk Esterase: Negative / RBC: 10-25 /HPF / WBC 3-5 /HPF   Sq Epi: x / Non Sq Epi: x / Bacteria: Trace /HPF        RADIOLOGY & ADDITIONAL TESTS:     Neurology Stroke Progress Note    INTERVAL HPI/OVERNIGHT EVENTS:  Patient seen and examined @ bedside.  number # [id number] used. Pt resting comfortably, denies any HA/ any complaints. Responds appropriately to the questions asked via the . Alert,awake, oriented to self, not to the time (month/year), oriented to situation inconsistently, when given choices, states he was told by his family that he fell and has blood in his brain. HIDA scan D/C'd as pt is asymptomatic no C/O abd pain/N/V. Abd soft/NT/ND, afebrile, LFTs WNL. Will recommend outpt F/U given U/S reccs.  Pt noted to be in Afib on the monitor. In retrospect, per chart review, pt has Hx of afib and D/W family, they are aware of afib and is not on any AC, only on plavix per family. Per chart review, was on Eliquis in  and was seen by Neuro for cerebellar bleed, eliquis was ? held for 4 weeks, unclear F/U. Will C/W Metoprolol and no AC @ this point of time 2/ frontal IPH.    O/N : BP persistently >170's despite adding lisinopril 10mg po daily to his home med hydralazine 25mg po Q8Hrs. Increased hydral to 50mg po Q8hrs and gave additional 25mg PO @ 9am with subsequent decrease in BPs to 130's.    MEDICATIONS  (STANDING):  chlorhexidine 2% Cloths 1 Application(s) Topical <User Schedule>  donepezil 5 milliGRAM(s) Oral at bedtime  finasteride 5 milliGRAM(s) Oral daily  hydrALAZINE 50 milliGRAM(s) Oral every 8 hours  levETIRAcetam  IVPB 500 milliGRAM(s) IV Intermittent every 12 hours  lisinopril 10 milliGRAM(s) Oral daily  metoprolol tartrate 25 milliGRAM(s) Oral two times a day  polyethylene glycol 3350 17 Gram(s) Oral daily  senna 2 Tablet(s) Oral at bedtime  tamsulosin 0.4 milliGRAM(s) Oral at bedtime    MEDICATIONS  (PRN):  acetaminophen     Tablet .. 650 milliGRAM(s) Oral every 6 hours PRN Temp greater or equal to 38C (100.4F), Mild Pain (1 - 3)      Allergies  No Known Allergies      Vital Signs Last 24 Hrs  T(C): 36.9 (2023 09:19), Max: 37.2 (2023 18:06)  T(F): 98.4 (2023 09:19), Max: 98.9 (2023 18:06)  HR: 94 (2023 11:44) (66 - 94)  BP: 149/77 (2023 11:44) (139/67 - 191/86)  BP(mean): 106 (2023 11:44) (96 - 129)  RR: 20 (2023 11:44) (11 - 20)  SpO2: 97% (2023 11:44) (97% - 98%)    Parameters below as of 2023 11:44  Patient On (Oxygen Delivery Method): room air        Physical exam:  General: No acute distress, awake and alert  Eyes: Anicteric sclerae, moist conjunctivae, see below for CNs  Neck: trachea midline, FROM.   Cardiovascular: Irregular (Afib @ times).  Pulmonary: Anterior breath sounds clear bilaterally, No use of accessory muscles.  GI: Abdomen soft, non-distended, non-tender      Neurologic:  -Mental status: Awake, alert, oriented to person(self, able to state his name and age, unable to state birth year), place (states as hospital), and time(able to state month but not year). Speech is fluent with intact naming, repetition, and comprehension, no dysarthria. Decreased attention span and sometimes require repetition of instructions. Follows some simple commands and mimic some.  -Cranial nerves:   II: Visual fields are full to confrontation.  III, IV, VI: Extraocular movements are intact without nystagmus. Pupils equally round and reactive to light.  V:  Facial sensation V1-V3 equal and intact   VII: Face is symmetric with normal eye closure and smile  VIII: Hearing is bilaterally intact to finger rub  IX, X: Uvula is midline and soft palate rises symmetrically  XI: Head turning and shoulder shrug are intact.  XII: Tongue protrudes midline  Motor: Normal bulk and tone. No pronator drift. Hard to assess strength 2/2 inattentiveness and pt has difficulty in following commands to some extent. B/l UE atleast 3/5, antigravity, able to hold it atleast for 5-8 secs, B/L LE : 4/5 with some bobbing in place.   Sensation: Intact to light touch bilaterally. hard to assess for DSST.  Coordination: Hard to assess.  Gait: Deferred.    LABS:                        10.8   7.61  )-----------( 148      ( 2023 05:30 )             32.4         137  |  105  |  10  ----------------------------<  143<H>  3.2<L>   |  22  |  0.75    Ca    7.8<L>      2023 05:30  Phos  2.7       Mg     2.0         TPro  6.8  /  Alb  4.1  /  TBili  0.7  /  DBili  x   /  AST  46<H>  /  ALT  20  /  AlkPhos  52  04-20    PT/INR - ( 2023 21:57 )   PT: 12.7 sec;   INR: 1.07          PTT - ( 2023 21:57 )  PTT:27.9 sec  Urinalysis Basic - ( 2023 18:00 )    Color: Yellow / Appearance: Clear / S.015 / pH: x  Gluc: x / Ketone: Negative  / Bili: Negative / Urobili: Negative   Blood: x / Protein: 30 mg/dL / Nitrite: Negative   Leuk Esterase: Negative / RBC: 10-25 /HPF / WBC 3-5 /HPF   Sq Epi: x / Non Sq Epi: x / Bacteria: Trace /HPF        RADIOLOGY & ADDITIONAL TESTS:     Neurology Stroke Progress Note    INTERVAL HPI/OVERNIGHT EVENTS:  Patient seen and examined @ bedside.  number # [id number] used. Pt resting comfortably, denies any HA/ any complaints. Responds appropriately to the questions asked via the . Alert,awake, oriented to self, not to the time (month/year), oriented to situation inconsistently, when given choices, states he was told by his family that he fell and has blood in his brain. HIDA scan D/C'd as pt is asymptomatic no C/O abd pain/N/V. Abd soft/NT/ND, afebrile, LFTs WNL. Will recommend outpt F/U given U/S reccs.  Pt noted to be in Afib on the monitor. In retrospect, per chart review, pt has Hx of afib and D/W family, they are aware of afib and is not on any AC, only on plavix per family. Per chart review, was on Eliquis in  and was seen by Neuro for cerebellar bleed, eliquis was ? held for 4 weeks, unclear F/U. Will C/W Metoprolol and no AC @ this point of time 2/ frontal IPH.    O/N : BP persistently >170's despite adding lisinopril 10mg po daily to his home med hydralazine 25mg po Q8Hrs. Increased hydral to 50mg po Q8hrs and gave additional 25mg PO @ 9am with subsequent decrease in BPs to 130's.    MEDICATIONS  (STANDING):  chlorhexidine 2% Cloths 1 Application(s) Topical <User Schedule>  donepezil 5 milliGRAM(s) Oral at bedtime  enoxaparin Injectable 40 milliGRAM(s) SubCutaneous every 24 hours  finasteride 5 milliGRAM(s) Oral daily  hydrALAZINE 50 milliGRAM(s) Oral every 8 hours  hydrALAZINE Injectable 5 milliGRAM(s) IV Push once  levETIRAcetam  IVPB 500 milliGRAM(s) IV Intermittent every 12 hours  lisinopril 10 milliGRAM(s) Oral daily  metoprolol tartrate 25 milliGRAM(s) Oral two times a day  polyethylene glycol 3350 17 Gram(s) Oral daily  senna 2 Tablet(s) Oral at bedtime  tamsulosin 0.4 milliGRAM(s) Oral at bedtime    MEDICATIONS  (PRN):  acetaminophen     Tablet .. 650 milliGRAM(s) Oral every 6 hours PRN Temp greater or equal to 38C (100.4F), Mild Pain (1 - 3)      Allergies  No Known Allergies      Vital Signs Last 24 Hrs  T(C): 36.9 (2023 09:19), Max: 37.2 (2023 18:06)  T(F): 98.4 (2023 09:19), Max: 98.9 (2023 18:06)  HR: 94 (2023 11:44) (66 - 94)  BP: 149/77 (2023 11:44) (139/67 - 191/86)  BP(mean): 106 (2023 11:44) (96 - 129)  RR: 20 (2023 11:44) (11 - 20)  SpO2: 97% (2023 11:44) (97% - 98%)    Parameters below as of 2023 11:44  Patient On (Oxygen Delivery Method): room air        Physical exam:  General: No acute distress, awake and alert  Eyes: Anicteric sclerae, moist conjunctivae, see below for CNs  Neck: trachea midline, FROM.   Cardiovascular: Irregular (Afib @ times).  Pulmonary: Anterior breath sounds clear bilaterally, No use of accessory muscles.  GI: Abdomen soft, non-distended, non-tender      Neurologic:  -Mental status: Awake, alert, oriented to person(self, able to state his name and age, unable to state birth year), place (states as hospital), and time(able to state month but not year). Speech is fluent with intact naming, repetition, and comprehension, no dysarthria. Decreased attention span and sometimes require repetition of instructions. Follows some simple commands and mimic some.  -Cranial nerves:   II: Visual fields are full to confrontation.  III, IV, VI: Extraocular movements are intact without nystagmus. Pupils equally round and reactive to light.  V:  Facial sensation V1-V3 equal and intact   VII: Face is symmetric with normal eye closure and smile  VIII: Hearing is bilaterally intact to finger rub  IX, X: Uvula is midline and soft palate rises symmetrically  XI: Head turning and shoulder shrug are intact.  XII: Tongue protrudes midline  Motor: Normal bulk and tone. No pronator drift. Hard to assess strength 2/2 inattentiveness and pt has difficulty in following commands to some extent. B/l UE atleast 3/5, antigravity, able to hold it atleast for 5-8 secs, B/L LE : 4/5 with some bobbing in place.   Sensation: Intact to light touch bilaterally. hard to assess for DSST.  Coordination: Hard to assess.  Gait: Deferred.    LABS:                        10.8   7.61  )-----------( 148      ( 2023 05:30 )             32.4     04-22    137  |  105  |  10  ----------------------------<  143<H>  3.2<L>   |  22  |  0.75    Ca    7.8<L>      2023 05:30  Phos  2.7     04-21  Mg     2.0     04-22    TPro  6.8  /  Alb  4.1  /  TBili  0.7  /  DBili  x   /  AST  46<H>  /  ALT  20  /  AlkPhos  52  04-20    PT/INR - ( 2023 21:57 )   PT: 12.7 sec;   INR: 1.07          PTT - ( 2023 21:57 )  PTT:27.9 sec  Urinalysis Basic - ( 2023 18:00 )    Color: Yellow / Appearance: Clear / S.015 / pH: x  Gluc: x / Ketone: Negative  / Bili: Negative / Urobili: Negative   Blood: x / Protein: 30 mg/dL / Nitrite: Negative   Leuk Esterase: Negative / RBC: 10-25 /HPF / WBC 3-5 /HPF   Sq Epi: x / Non Sq Epi: x / Bacteria: Trace /HPF        RADIOLOGY & ADDITIONAL TESTS:     Neurology Stroke Progress Note    INTERVAL HPI/OVERNIGHT EVENTS:  Patient seen and examined @ bedside.  number # [id number] used. Pt resting comfortably, denies any HA/ any complaints. Responds appropriately to the questions asked via the . Alert,awake, oriented to self, not to the time (month/year), oriented to situation inconsistently, when given choices, states he was told by his family that he fell and has blood in his brain. HIDA scan D/C'd as pt is asymptomatic no C/O abd pain/N/V. Abd soft/NT/ND, afebrile, LFTs WNL. Will recommend outpt F/U given U/S reccs.  Pt noted to be in Afib on the monitor. In retrospect, per chart review, pt has Hx of afib and D/W family, they are aware of afib and is not on any AC, only on plavix per family. Per chart review, was on Eliquis in  and was seen by Neuro for cerebellar bleed, eliquis was ? held for 4 weeks, unclear F/U. Will C/W Metoprolol and no AC @ this point of time 2/ frontal IPH.    O/N : BP persistently >170's despite adding lisinopril 10mg po daily to his home med hydralazine 25mg po Q8Hrs. Increased hydral to 50mg po Q8hrs and gave additional 25mg PO @ 9am with subsequent decrease in BPs to 130's.    MEDICATIONS  (STANDING):  chlorhexidine 2% Cloths 1 Application(s) Topical <User Schedule>  donepezil 5 milliGRAM(s) Oral at bedtime  enoxaparin Injectable 40 milliGRAM(s) SubCutaneous every 24 hours  finasteride 5 milliGRAM(s) Oral daily  hydrALAZINE 50 milliGRAM(s) Oral every 8 hours  hydrALAZINE Injectable 5 milliGRAM(s) IV Push once  levETIRAcetam  IVPB 500 milliGRAM(s) IV Intermittent every 12 hours  lisinopril 10 milliGRAM(s) Oral daily  metoprolol tartrate 25 milliGRAM(s) Oral two times a day  polyethylene glycol 3350 17 Gram(s) Oral daily  senna 2 Tablet(s) Oral at bedtime  tamsulosin 0.4 milliGRAM(s) Oral at bedtime    MEDICATIONS  (PRN):  acetaminophen     Tablet .. 650 milliGRAM(s) Oral every 6 hours PRN Temp greater or equal to 38C (100.4F), Mild Pain (1 - 3)      Allergies  No Known Allergies      Vital Signs Last 24 Hrs  T(C): 36.9 (2023 09:19), Max: 37.2 (2023 18:06)  T(F): 98.4 (2023 09:19), Max: 98.9 (2023 18:06)  HR: 94 (2023 11:44) (66 - 94)  BP: 149/77 (2023 11:44) (139/67 - 191/86)  BP(mean): 106 (2023 11:44) (96 - 129)  RR: 20 (2023 11:44) (11 - 20)  SpO2: 97% (2023 11:44) (97% - 98%)    Parameters below as of 2023 11:44  Patient On (Oxygen Delivery Method): room air        Physical exam:  General: No acute distress, awake and alert  Eyes: Anicteric sclerae, moist conjunctivae, see below for CNs  Neck: trachea midline, FROM.   Cardiovascular: Irregular (Afib @ times).  Pulmonary: Anterior breath sounds clear bilaterally, No use of accessory muscles.  GI: Abdomen soft, non-distended, non-tender      Neurologic:  -Mental status: Awake, alert, oriented to person(self, able to state his name and age, unable to state birth year), place (states as hospital), and time(able to state month but not year). Speech is fluent with intact naming, repetition, and comprehension, no dysarthria. Decreased attention span and sometimes require repetition of instructions. Follows some simple commands and mimic some.  -Cranial nerves:   II: Visual fields are full to confrontation.  III, IV, VI: Extraocular movements are intact without nystagmus. Pupils equally round and reactive to light.  V:  Facial sensation V1-V3 equal and intact   VII: Face is symmetric with normal eye closure and smile  VIII: Hearing is bilaterally intact to finger rub  IX, X: Uvula is midline and soft palate rises symmetrically  XI: Head turning and shoulder shrug are intact.  XII: Tongue protrudes midline  Motor: Normal bulk and tone. No pronator drift. Hard to assess strength 2/2 inattentiveness and pt has difficulty in following commands to some extent. B/l UE atleast 3/5, antigravity, able to hold it atleast for 5-8 secs, B/L LE : 4/5 with some bobbing in place.   Sensation: Intact to light touch bilaterally. hard to assess for DSST.  Coordination: Hard to assess.  Gait: Deferred.    LABS:                        10.8   7.61  )-----------( 148      ( 2023 05:30 )             32.4     04-22    137  |  105  |  10  ----------------------------<  143<H>  3.2<L>   |  22  |  0.75    Ca    7.8<L>      2023 05:30  Phos  2.7     04-  Mg     2.0     -    TPro  6.8  /  Alb  4.1  /  TBili  0.7  /  DBili  x   /  AST  46<H>  /  ALT  20  /  AlkPhos  52  04-20    PT/INR - ( 2023 21:57 )   PT: 12.7 sec;   INR: 1.07          PTT - ( 2023 21:57 )  PTT:27.9 sec  Urinalysis Basic - ( 2023 18:00 )    Color: Yellow / Appearance: Clear / S.015 / pH: x  Gluc: x / Ketone: Negative  / Bili: Negative / Urobili: Negative   Blood: x / Protein: 30 mg/dL / Nitrite: Negative   Leuk Esterase: Negative / RBC: 10-25 /HPF / WBC 3-5 /HPF   Sq Epi: x / Non Sq Epi: x / Bacteria: Trace /HPF        RADIOLOGY & ADDITIONAL TESTS:    CT Head No Cont (23 @ 01:35)   Impression:    Stable right frontal intraparenchymal hematoma..

## 2023-04-22 NOTE — CONSULT NOTE ADULT - SUBJECTIVE AND OBJECTIVE BOX
**Incomplete Note**    ALDAIR MARIELY    HPI: 87yM Catonese speaking male pmhx of dementia (oriented x 2 at baseline, typically ambulates with cane or walker), HTN, prior CVA, and BPH presents to [facility] ED with altered mental status in the setting of 2 falls and urinary incontinence. Per ED records, patient's daughters stated that he had recent insomnia. Subsequently, trazodone increased from 50 mg to 100 mg before bedtime 2 days ago. Pt had 2 falls today, both unwitnessed, unknown downtime. Daughter states patient had urinary incontinence. He presented to [place] ED by EMS. 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 Transferred to [facility] for further management. ICH score 1, NIHSS 1. GCS 13. He was started on Cardene and given Keppra. Last dose of Plavix was 4/19 and with possible NSTEMI, plavix not reversed, troponin now downtrending. Currently denies chest pain, palpitations shortness of breath. Also had trauma imaging including CT Cspine/ chest/abd/pelvis, shows compression fractures of T5 and L5 and cholelithiasis with focal gallbladder wall thickening. 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 Patient now off cardene, with stable scan able to be transferred from NSICU to Stroke/Telemetry for further workup.        PAST MEDICAL/SURGICAL HISTORY  PAST MEDICAL & SURGICAL HISTORY:  BPH (benign prostatic hyperplasia)      Irregular heart rate      Lumbar disc herniation      Hypertension      Stroke      No significant past surgical history          REVIEW OF SYSTEMS:  CONSTITUTIONAL: No fever, weight loss, or fatigue  EYES: No eye pain, visual disturbances, or discharge  ENMT:  No difficulty hearing, tinnitus, vertigo; No sinus or throat pain  NECK: No pain or stiffness  BREASTS: No pain, masses, or nipple discharge  RESPIRATORY: No cough, wheezing, chills or hemoptysis; No shortness of breath  CARDIOVASCULAR: No chest pain, palpitations, dizziness, or leg swelling  GASTROINTESTINAL: No abdominal or epigastric pain. No nausea, vomiting, or hematemesis; No diarrhea or constipation. No melena or hematochezia.  GENITOURINARY: No dysuria, frequency, hematuria, or incontinence  NEUROLOGICAL: No headaches, memory loss, loss of strength, numbness, or tremors  SKIN: No itching, burning, rashes, or lesions   LYMPH NODES: No enlarged glands  ENDOCRINE: No heat or cold intolerance; No hair loss  MUSCULOSKELETAL: No joint pain or swelling; No muscle, back, or extremity pain  PSYCHIATRIC: No depression, anxiety, mood swings, or difficulty sleeping  HEME/LYMPH: No easy bruising, or bleeding gums  ALLERY AND IMMUNOLOGIC: No hives or eczema    T(C): 36.7 (04-21-23 @ 22:00), Max: 37.2 (04-21-23 @ 18:06)  HR: 72 (04-22-23 @ 08:27) (54 - 94)  BP: 178/88 (04-22-23 @ 08:27) (131/92 - 191/86)  RR: 17 (04-22-23 @ 08:27) (11 - 28)  SpO2: 97% (04-22-23 @ 08:27) (97% - 99%)  Wt(kg): --Vital Signs Last 24 Hrs  T(C): 36.7 (21 Apr 2023 22:00), Max: 37.2 (21 Apr 2023 18:06)  T(F): 98 (21 Apr 2023 22:00), Max: 98.9 (21 Apr 2023 18:06)  HR: 72 (22 Apr 2023 08:27) (54 - 94)  BP: 178/88 (22 Apr 2023 08:27) (131/92 - 191/86)  BP(mean): 123 (22 Apr 2023 08:27) (95 - 129)  RR: 17 (22 Apr 2023 08:27) (11 - 28)  SpO2: 97% (22 Apr 2023 08:27) (97% - 99%)    Parameters below as of 22 Apr 2023 08:27  Patient On (Oxygen Delivery Method): room air        PHYSICAL EXAM:  GENERAL: NAD, well-groomed, well-developed  HEAD:  Atraumatic, Normocephalic  EYES: EOMI, PERRLA, conjunctiva and sclera clear  ENMT: No tonsillar erythema, exudates, or enlargement; Moist mucous membranes, Good dentition, No lesions  NECK: Supple, No JVD, Normal thyroid  NERVOUS SYSTEM:  Alert & Oriented X3, Good concentration; Motor Strength 5/5 B/L upper and lower extremities; DTRs 2+ intact and symmetric  CHEST/LUNG: Clear to percussion bilaterally; No rales, rhonchi, wheezing, or rubs  HEART: Regular rate and rhythm; No murmurs, rubs, or gallops  ABDOMEN: Soft, Nontender, Nondistended; Bowel sounds present  EXTREMITIES:  2+ Peripheral Pulses, No clubbing, cyanosis, or edema  LYMPH: No lymphadenopathy noted  SKIN: No rashes or lesions    Consultant(s) Notes Reviewed:  [x ] YES  [ ] NO  Care Discussed with Consultants/Other Providers [ x] YES  [ ] NO    LABS:  CBC   04-22-23 @ 05:30  Hematcorit 32.4  Hemoglobin 10.8  Mean Cell Hemoglobin 32.8  Platelet Count-Automated 148  RBC Count 3.29  Red Cell Distrib Width 12.5  Wbc Count 7.61      BMP  04-22-23 @ 05:30  Anion Gap. Serum 10  Blood Urea Nitrogen,Serm 10  Calcium, Total Serum 7.8  Carbon Dioxide, Serum 22  Chloride, Serum 105  Creatinine, Serum 0.75  eGFR in  --  eGFR in Non Afican American --  Gloucose, serum 143  Potassium, Serum 3.2  Sodium, Serum 137              04-21-23 @ 04:07  Anion Gap. Serum 9  Blood Urea Nitrogen,Serm 12  Calcium, Total Serum 8.1  Carbon Dioxide, Serum 25  Chloride, Serum 104  Creatinine, Serum 0.71  eGFR in  --  eGFR in Non Afican American --  Gloucose, serum 101  Potassium, Serum 3.5  Sodium, Serum 138              04-20-23 @ 21:57  Anion Gap. Serum 10  Blood Urea Nitrogen,Serm 14  Calcium, Total Serum 8.1  Carbon Dioxide, Serum 23  Chloride, Serum 101  Creatinine, Serum 0.80  eGFR in  --  eGFR in Non Afican American --  Gloucose, serum 122  Potassium, Serum 3.8  Sodium, Serum 134              04-20-23 @ 18:00  Anion Gap. Serum 5  Blood Urea Nitrogen,Serm 17  Calcium, Total Serum 8.8  Carbon Dioxide, Serum 25  Chloride, Serum 105  Creatinine, Serum 0.98  eGFR in  --  eGFR in Non Afican American --  Gloucose, serum 127  Potassium, Serum 4.4  Sodium, Serum 135                  CMP  04-22-23 @ 05:30  Chelsi Aminotransferase(ALT/SGPT)--  Albumin, Serum --  Alkaline Phosphatase, Serum --  Anion Gap, Serum 10  Aspartate Aminotransferase (AST/SGOT)--  Bilirubin Total, Serum --  Blood Urea Nitrogen, Serum 10  Calcium,Total Serum 7.8  Carbon Dioxide, Serum 22  Chloride, Serum 105  Creatinine, Serum 0.75  eGFR if  --  eGFR if Non African American --  Glucose, Serum 143  Potassium, Serum 3.2  Protein Total, Serum --  Sodium, Serum 137                      04-21-23 @ 04:07  Chelsi Aminotransferase(ALT/SGPT)--  Albumin, Serum --  Alkaline Phosphatase, Serum --  Anion Gap, Serum 9  Aspartate Aminotransferase (AST/SGOT)--  Bilirubin Total, Serum --  Blood Urea Nitrogen, Serum 12  Calcium,Total Serum 8.1  Carbon Dioxide, Serum 25  Chloride, Serum 104  Creatinine, Serum 0.71  eGFR if  --  eGFR if Non African American --  Glucose, Serum 101  Potassium, Serum 3.5  Protein Total, Serum --  Sodium, Serum 138                      04-20-23 @ 21:57  Chelsi Aminotransferase(ALT/SGPT)20  Albumin, Serum 4.1  Alkaline Phosphatase, Serum 52  Anion Gap, Serum 10  Aspartate Aminotransferase (AST/SGOT)46  Bilirubin Total, Serum 0.7  Blood Urea Nitrogen, Serum 14  Calcium,Total Serum 8.1  Carbon Dioxide, Serum 23  Chloride, Serum 101  Creatinine, Serum 0.80  eGFR if  --  eGFR if Non African American --  Glucose, Serum 122  Potassium, Serum 3.8  Protein Total, Serum 6.8  Sodium, Serum 134                      04-20-23 @ 18:00  Chelsi Aminotransferase(ALT/SGPT)29  Albumin, Serum 3.8  Alkaline Phosphatase, Serum 49  Anion Gap, Serum 5  Aspartate Aminotransferase (AST/SGOT)51  Bilirubin Total, Serum 0.7  Blood Urea Nitrogen, Serum 17  Calcium,Total Serum 8.8  Carbon Dioxide, Serum 25  Chloride, Serum 105  Creatinine, Serum 0.98  eGFR if  --  eGFR if Non African American --  Glucose, Serum 127  Potassium, Serum 4.4  Protein Total, Serum 7.7  Sodium, Serum 135                          PT/INR  PT/INR  04-20-23 @ 21:57  INR 1.07  Prothrombin Time Comment --  Prothrobin Time, [id number]  PT/INR  04-20-23 @ 20:20  INR 1.07  Prothrombin Time Comment --  Prothrobin Time, [id number]      Amylase/Lipase            RADIOLOGY & ADDITIONAL TESTS:    Imaging Personally Reviewed:  [ ] YES  [ ] NO MARIELY QUINTEROS    HPI: 87yM Catonese speaking male pmhx of dementia (oriented x 2 at baseline, typically ambulates with cane or walker), HTN, prior CVA, and BPH presents to [facility] ED with altered mental status in the setting of 2 falls and urinary incontinence. Per ED records, patient's daughters stated that he had recent insomnia. Subsequently, trazodone increased from 50 mg to 100 mg before bedtime 2 days ago. Pt had 2 falls today, both unwitnessed, unknown downtime. Daughter states patient had urinary incontinence. He presented to [place] ED by EMS. 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 Transferred to [facility] for further management. ICH score 1, NIHSS 1. GCS 13. He was started on Cardene and given Keppra. Last dose of Plavix was 4/19 and with possible NSTEMI, plavix not reversed, troponin now downtrending. Currently denies chest pain, palpitations shortness of breath. Also had trauma imaging including CT Cspine/ chest/abd/pelvis, shows compression fractures of T5 and L5 and cholelithiasis with focal gallbladder wall thickening. 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 Patient now off cardene, with stable scan able to be transferred from NSICU to Stroke/Telemetry for further workup.      Pt seen and examind      PAST MEDICAL/SURGICAL HISTORY  PAST MEDICAL & SURGICAL HISTORY:  BPH (benign prostatic hyperplasia)      Irregular heart rate      Lumbar disc herniation      Hypertension      Stroke      No significant past surgical history          REVIEW OF SYSTEMS:  CONSTITUTIONAL: No fever, weight loss, or fatigue  EYES: No eye pain, visual disturbances, or discharge  ENMT:  No difficulty hearing, tinnitus, vertigo; No sinus or throat pain  NECK: No pain or stiffness  BREASTS: No pain, masses, or nipple discharge  RESPIRATORY: No cough, wheezing, chills or hemoptysis; No shortness of breath  CARDIOVASCULAR: No chest pain, palpitations, dizziness, or leg swelling  GASTROINTESTINAL: No abdominal or epigastric pain. No nausea, vomiting, or hematemesis; No diarrhea or constipation. No melena or hematochezia.  GENITOURINARY: No dysuria, frequency, hematuria, or incontinence  NEUROLOGICAL: No headaches, memory loss, loss of strength, numbness, or tremors  SKIN: No itching, burning, rashes, or lesions   LYMPH NODES: No enlarged glands  ENDOCRINE: No heat or cold intolerance; No hair loss  MUSCULOSKELETAL: No joint pain or swelling; No muscle, back, or extremity pain  PSYCHIATRIC: No depression, anxiety, mood swings, or difficulty sleeping  HEME/LYMPH: No easy bruising, or bleeding gums  ALLERY AND IMMUNOLOGIC: No hives or eczema    T(C): 36.7 (04-21-23 @ 22:00), Max: 37.2 (04-21-23 @ 18:06)  HR: 72 (04-22-23 @ 08:27) (54 - 94)  BP: 178/88 (04-22-23 @ 08:27) (131/92 - 191/86)  RR: 17 (04-22-23 @ 08:27) (11 - 28)  SpO2: 97% (04-22-23 @ 08:27) (97% - 99%)  Wt(kg): --Vital Signs Last 24 Hrs  T(C): 36.7 (21 Apr 2023 22:00), Max: 37.2 (21 Apr 2023 18:06)  T(F): 98 (21 Apr 2023 22:00), Max: 98.9 (21 Apr 2023 18:06)  HR: 72 (22 Apr 2023 08:27) (54 - 94)  BP: 178/88 (22 Apr 2023 08:27) (131/92 - 191/86)  BP(mean): 123 (22 Apr 2023 08:27) (95 - 129)  RR: 17 (22 Apr 2023 08:27) (11 - 28)  SpO2: 97% (22 Apr 2023 08:27) (97% - 99%)    Parameters below as of 22 Apr 2023 08:27  Patient On (Oxygen Delivery Method): room air        PHYSICAL EXAM:  GENERAL: NAD, well-groomed, well-developed  HEAD:  Atraumatic, Normocephalic  EYES: EOMI, PERRLA, conjunctiva and sclera clear  ENMT: No tonsillar erythema, exudates, or enlargement; Moist mucous membranes, Good dentition, No lesions  NECK: Supple, No JVD, Normal thyroid  NERVOUS SYSTEM:  Alert & Oriented X3, Good concentration; Motor Strength 5/5 B/L upper and lower extremities; DTRs 2+ intact and symmetric  CHEST/LUNG: Clear to percussion bilaterally; No rales, rhonchi, wheezing, or rubs  HEART: Regular rate and rhythm; No murmurs, rubs, or gallops  ABDOMEN: Soft, Nontender, Nondistended; Bowel sounds present  EXTREMITIES:  2+ Peripheral Pulses, No clubbing, cyanosis, or edema  LYMPH: No lymphadenopathy noted  SKIN: No rashes or lesions    Consultant(s) Notes Reviewed:  [x ] YES  [ ] NO  Care Discussed with Consultants/Other Providers [ x] YES  [ ] NO    LABS:  CBC   04-22-23 @ 05:30  Hematcorit 32.4  Hemoglobin 10.8  Mean Cell Hemoglobin 32.8  Platelet Count-Automated 148  RBC Count 3.29  Red Cell Distrib Width 12.5  Wbc Count 7.61      BMP  04-22-23 @ 05:30  Anion Gap. Serum 10  Blood Urea Nitrogen,Serm 10  Calcium, Total Serum 7.8  Carbon Dioxide, Serum 22  Chloride, Serum 105  Creatinine, Serum 0.75  eGFR in  --  eGFR in Non Afican American --  Gloucose, serum 143  Potassium, Serum 3.2  Sodium, Serum 137              04-21-23 @ 04:07  Anion Gap. Serum 9  Blood Urea Nitrogen,Serm 12  Calcium, Total Serum 8.1  Carbon Dioxide, Serum 25  Chloride, Serum 104  Creatinine, Serum 0.71  eGFR in  --  eGFR in Non Afican American --  Gloucose, serum 101  Potassium, Serum 3.5  Sodium, Serum 138              04-20-23 @ 21:57  Anion Gap. Serum 10  Blood Urea Nitrogen,Serm 14  Calcium, Total Serum 8.1  Carbon Dioxide, Serum 23  Chloride, Serum 101  Creatinine, Serum 0.80  eGFR in  --  eGFR in Non Afican American --  Gloucose, serum 122  Potassium, Serum 3.8  Sodium, Serum 134              04-20-23 @ 18:00  Anion Gap. Serum 5  Blood Urea Nitrogen,Serm 17  Calcium, Total Serum 8.8  Carbon Dioxide, Serum 25  Chloride, Serum 105  Creatinine, Serum 0.98  eGFR in  --  eGFR in Non Afican American --  Gloucose, serum 127  Potassium, Serum 4.4  Sodium, Serum 135                  CMP  04-22-23 @ 05:30  Chelsi Aminotransferase(ALT/SGPT)--  Albumin, Serum --  Alkaline Phosphatase, Serum --  Anion Gap, Serum 10  Aspartate Aminotransferase (AST/SGOT)--  Bilirubin Total, Serum --  Blood Urea Nitrogen, Serum 10  Calcium,Total Serum 7.8  Carbon Dioxide, Serum 22  Chloride, Serum 105  Creatinine, Serum 0.75  eGFR if  --  eGFR if Non African American --  Glucose, Serum 143  Potassium, Serum 3.2  Protein Total, Serum --  Sodium, Serum 137                      04-21-23 @ 04:07  Chelsi Aminotransferase(ALT/SGPT)--  Albumin, Serum --  Alkaline Phosphatase, Serum --  Anion Gap, Serum 9  Aspartate Aminotransferase (AST/SGOT)--  Bilirubin Total, Serum --  Blood Urea Nitrogen, Serum 12  Calcium,Total Serum 8.1  Carbon Dioxide, Serum 25  Chloride, Serum 104  Creatinine, Serum 0.71  eGFR if  --  eGFR if Non African American --  Glucose, Serum 101  Potassium, Serum 3.5  Protein Total, Serum --  Sodium, Serum 138                      04-20-23 @ 21:57  Chelsi Aminotransferase(ALT/SGPT)20  Albumin, Serum 4.1  Alkaline Phosphatase, Serum 52  Anion Gap, Serum 10  Aspartate Aminotransferase (AST/SGOT)46  Bilirubin Total, Serum 0.7  Blood Urea Nitrogen, Serum 14  Calcium,Total Serum 8.1  Carbon Dioxide, Serum 23  Chloride, Serum 101  Creatinine, Serum 0.80  eGFR if  --  eGFR if Non African American --  Glucose, Serum 122  Potassium, Serum 3.8  Protein Total, Serum 6.8  Sodium, Serum 134                      04-20-23 @ 18:00  Chelsi Aminotransferase(ALT/SGPT)29  Albumin, Serum 3.8  Alkaline Phosphatase, Serum 49  Anion Gap, Serum 5  Aspartate Aminotransferase (AST/SGOT)51  Bilirubin Total, Serum 0.7  Blood Urea Nitrogen, Serum 17  Calcium,Total Serum 8.8  Carbon Dioxide, Serum 25  Chloride, Serum 105  Creatinine, Serum 0.98  eGFR if  --  eGFR if Non African American --  Glucose, Serum 127  Potassium, Serum 4.4  Protein Total, Serum 7.7  Sodium, Serum 135                          PT/INR  PT/INR  04-20-23 @ 21:57  INR 1.07  Prothrombin Time Comment --  Prothrobin Time, [id number]  PT/INR  04-20-23 @ 20:20  INR 1.07  Prothrombin Time Comment --  Prothrobin Time, [id number]      Amylase/Lipase            RADIOLOGY & ADDITIONAL TESTS:    Imaging Personally Reviewed:  [ ] YES  [ ] NO NATHANIEL QUINTEROSALANNAH    HPI: 87yM Catonese speaking male pmhx of dementia (oriented x 2 at baseline, typically ambulates with cane or walker), HTN, prior CVA, and BPH presents to [facility] ED with altered mental status in the setting of 2 falls and urinary incontinence. Per ED records, patient's daughters stated that he had recent insomnia. Subsequently, trazodone increased from 50 mg to 100 mg before bedtime 2 days ago. Pt had 2 falls today, both unwitnessed, unknown downtime. Daughter states patient had urinary incontinence. He presented to [place] ED by EMS. 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 Transferred to [facility] for further management. ICH score 1, NIHSS 1. GCS 13. He was started on Cardene and given Keppra. Last dose of Plavix was 4/19 and with possible NSTEMI, plavix not reversed, troponin now downtrending. Currently denies chest pain, palpitations shortness of breath. Also had trauma imaging including CT Cspine/ chest/abd/pelvis, shows compression fractures of T5 and L5 and cholelithiasis with focal gallbladder wall thickening. 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 Patient now off cardene, with stable scan able to be transferred from NSICU to Stroke/Telemetry for further workup.      Pt seen and examined at bedside, NAD, barron on b/l d/t pulling lines out, unable to obtain ROS given nonsensical Cantonese words ()      PAST MEDICAL/SURGICAL HISTORY  PAST MEDICAL & SURGICAL HISTORY:  BPH (benign prostatic hyperplasia)      Irregular heart rate      Lumbar disc herniation      Hypertension      Stroke      No significant past surgical history          REVIEW OF SYSTEMS:  Unable to obtain for said reasons     T(C): 36.7 (04-21-23 @ 22:00), Max: 37.2 (04-21-23 @ 18:06)  HR: 72 (04-22-23 @ 08:27) (54 - 94)  BP: 178/88 (04-22-23 @ 08:27) (131/92 - 191/86)  RR: 17 (04-22-23 @ 08:27) (11 - 28)  SpO2: 97% (04-22-23 @ 08:27) (97% - 99%)  Wt(kg): --Vital Signs Last 24 Hrs  T(C): 36.7 (21 Apr 2023 22:00), Max: 37.2 (21 Apr 2023 18:06)  T(F): 98 (21 Apr 2023 22:00), Max: 98.9 (21 Apr 2023 18:06)  HR: 72 (22 Apr 2023 08:27) (54 - 94)  BP: 178/88 (22 Apr 2023 08:27) (131/92 - 191/86)  BP(mean): 123 (22 Apr 2023 08:27) (95 - 129)  RR: 17 (22 Apr 2023 08:27) (11 - 28)  SpO2: 97% (22 Apr 2023 08:27) (97% - 99%)    Parameters below as of 22 Apr 2023 08:27  Patient On (Oxygen Delivery Method): room air        PHYSICAL EXAM:  GENERAL: elderly gentleman, NAD, well-groomed, well-developed  HEAD:  Atraumatic, Normocephalic  EYES: EOMI, PERRLA, conjunctiva and sclera clear  ENMT: No tonsillar erythema, exudates, or enlargement; Moist mucous membranes, Good dentition, No lesions  NECK: Supple, No JVD, Normal thyroid  General: normocephalic, atraumatic, laying in bed, in no distress  Neuro     MS: eyes open, laughing, alert x2, unintelligible speech w/ presence of cantonese , follows simple commands, cooperative to an extent    CN: PERRL, VF FTC, EOMI and no ptosis bilaterally, sensation intact to crude touch V1-V3, face symmetric, hearing grossly intact    Mot: bulk normal, tone normal, power 5/5 in bilateral upper and lower proximal extremities  Chest: nonlabored respirations, no adventitious lung sounds bilaterally, heart regular rate/rhythm, present S1/S2, no murmurs or rubs  Abdomen: nondistended, soft and nontender without peritoneal signs, normoactive bowel sounds  Extremities: no clubbing, well-perfused, no edema  CHEST/LUNG: Clear to percussion bilaterally; No rales, rhonchi, wheezing, or rubs  HEART: Regular rate and rhythm; No murmurs, rubs, or gallops  ABDOMEN: Soft, Nontender, Nondistended; Bowel sounds present  EXTREMITIES:  2+ Peripheral Pulses, No clubbing, cyanosis, or edema  LYMPH: No lymphadenopathy noted  SKIN: No rashes or lesions    Consultant(s) Notes Reviewed:  [x ] YES  [ ] NO  Care Discussed with Consultants/Other Providers [ x] YES  [ ] NO    LABS:  CBC   04-22-23 @ 05:30  Hematcorit 32.4  Hemoglobin 10.8  Mean Cell Hemoglobin 32.8  Platelet Count-Automated 148  RBC Count 3.29  Red Cell Distrib Width 12.5  Wbc Count 7.61      BMP  04-22-23 @ 05:30  Anion Gap. Serum 10  Blood Urea Nitrogen,Serm 10  Calcium, Total Serum 7.8  Carbon Dioxide, Serum 22  Chloride, Serum 105  Creatinine, Serum 0.75  eGFR in  --  eGFR in Non Afican American --  Gloucose, serum 143  Potassium, Serum 3.2  Sodium, Serum 137              04-21-23 @ 04:07  Anion Gap. Serum 9  Blood Urea Nitrogen,Serm 12  Calcium, Total Serum 8.1  Carbon Dioxide, Serum 25  Chloride, Serum 104  Creatinine, Serum 0.71  eGFR in  --  eGFR in Non Afican American --  Gloucose, serum 101  Potassium, Serum 3.5  Sodium, Serum 138              04-20-23 @ 21:57  Anion Gap. Serum 10  Blood Urea Nitrogen,Serm 14  Calcium, Total Serum 8.1  Carbon Dioxide, Serum 23  Chloride, Serum 101  Creatinine, Serum 0.80  eGFR in  --  eGFR in Non Afican American --  Gloucose, serum 122  Potassium, Serum 3.8  Sodium, Serum 134              04-20-23 @ 18:00  Anion Gap. Serum 5  Blood Urea Nitrogen,Serm 17  Calcium, Total Serum 8.8  Carbon Dioxide, Serum 25  Chloride, Serum 105  Creatinine, Serum 0.98  eGFR in  --  eGFR in Non Afican American --  Gloucose, serum 127  Potassium, Serum 4.4  Sodium, Serum 135                  CMP  04-22-23 @ 05:30  Chelsi Aminotransferase(ALT/SGPT)--  Albumin, Serum --  Alkaline Phosphatase, Serum --  Anion Gap, Serum 10  Aspartate Aminotransferase (AST/SGOT)--  Bilirubin Total, Serum --  Blood Urea Nitrogen, Serum 10  Calcium,Total Serum 7.8  Carbon Dioxide, Serum 22  Chloride, Serum 105  Creatinine, Serum 0.75  eGFR if  --  eGFR if Non African American --  Glucose, Serum 143  Potassium, Serum 3.2  Protein Total, Serum --  Sodium, Serum 137                      04-21-23 @ 04:07  Chelsi Aminotransferase(ALT/SGPT)--  Albumin, Serum --  Alkaline Phosphatase, Serum --  Anion Gap, Serum 9  Aspartate Aminotransferase (AST/SGOT)--  Bilirubin Total, Serum --  Blood Urea Nitrogen, Serum 12  Calcium,Total Serum 8.1  Carbon Dioxide, Serum 25  Chloride, Serum 104  Creatinine, Serum 0.71  eGFR if  --  eGFR if Non African American --  Glucose, Serum 101  Potassium, Serum 3.5  Protein Total, Serum --  Sodium, Serum 138                      04-20-23 @ 21:57  Chelsi Aminotransferase(ALT/SGPT)20  Albumin, Serum 4.1  Alkaline Phosphatase, Serum 52  Anion Gap, Serum 10  Aspartate Aminotransferase (AST/SGOT)46  Bilirubin Total, Serum 0.7  Blood Urea Nitrogen, Serum 14  Calcium,Total Serum 8.1  Carbon Dioxide, Serum 23  Chloride, Serum 101  Creatinine, Serum 0.80  eGFR if  --  eGFR if Non African American --  Glucose, Serum 122  Potassium, Serum 3.8  Protein Total, Serum 6.8  Sodium, Serum 134                      04-20-23 @ 18:00  Chelsi Aminotransferase(ALT/SGPT)29  Albumin, Serum 3.8  Alkaline Phosphatase, Serum 49  Anion Gap, Serum 5  Aspartate Aminotransferase (AST/SGOT)51  Bilirubin Total, Serum 0.7  Blood Urea Nitrogen, Serum 17  Calcium,Total Serum 8.8  Carbon Dioxide, Serum 25  Chloride, Serum 105  Creatinine, Serum 0.98  eGFR if  --  eGFR if Non African American --  Glucose, Serum 127  Potassium, Serum 4.4  Protein Total, Serum 7.7  Sodium, Serum 135                          PT/INR  PT/INR  04-20-23 @ 21:57  INR 1.07  Prothrombin Time Comment --  Prothrobin Time, [id number]  PT/INR  04-20-23 @ 20:20  INR 1.07  Prothrombin Time Comment --  Prothrobin Time, [id number]      Amylase/Lipase            RADIOLOGY & ADDITIONAL TESTS:    Imaging Personally Reviewed:  [ ] YES  [ ] NO

## 2023-04-22 NOTE — CONSULT NOTE ADULT - ASSESSMENT
87yM Catonese speaking male pmhx of dementia (oriented x 2 at baseline, typically ambulates with cane or walker), HTN, prior CVA, and BPH presents to [facility] ED with altered mental status in the setting of 2 falls and urinary incontinence. He was found to have a right frontal IPH and concern for seizures, he was admitted to NSICU for cardene gtt and q1h neurochecks; he was later then transferred to [facility] NSICU then stepped down to stroke mgmt. Medicine comgmt consulted for remainder of care.    Plan:  #Neuro- Defer care to primary team for right frontal IPH and c/f provoked seizures,   #Cardiovascular- long standing persistent afib: c/w rate controll w/lopressor 25mg BID, defer AC given new IPH, HTN: BP range per primary team, if above goal can increased hydral to 50mg TID w/hold parameters  #Pulmonary- ALEJO  #GI- c/w bowel regimen  #Renal- Rhabdo: elevated CK, UA + blood, no evidence of LUC, trend renal perofrmance  #Endocrine- ALEJO  #Heme/Onc-Normocytic Anemia: no evidence of acute bleed likely anemia of chronic inflammation, please obtain iron panel, transfuse if hgb<7, maintain active T/S  #ID-ALEJO  #MSK-ALEJO    Dispo: AIR   87yM Catonese speaking male pmhx of dementia (oriented x 2 at baseline, typically ambulates with cane or walker), HTN, prior CVA, and BPH presents to [facility] ED with altered mental status in the setting of 2 falls and urinary incontinence. He was found to have a right frontal IPH and concern for seizures, he was admitted to NSICU for cardene gtt and q1h neurochecks; he was later then transferred to [facility] NSICU then stepped down to stroke mgmt. Medicine comgmt consulted for remainder of care.    Plan:  #Neuro- Defer care to primary team for right frontal IPH and c/f provoked seizures, begin atorvastatin 80mg qd  #Cardiovascular- long standing persistent afib: c/w rate controll w/lopressor 25mg BID, defer AC given new IPH, HTN: BP range per primary team, if above goal can increased hydral to 50mg TID w/hold parameters  #Pulmonary- ALEJO  #GI- c/w bowel regimen  #Renal- Rhabdo: elevated CK, UA + blood, no evidence of LUC, trend renal perofrmance  #Endocrine- ALEJO  #Heme/Onc-Normocytic Anemia: no evidence of acute bleed likely anemia of chronic inflammation, please obtain iron panel, transfuse if hgb<7, maintain active T/S  #ID-ALEJO  #MSK-ALEJO    Dispo: AIR   87yM Catonese speaking male pmhx of dementia (oriented x 2 at baseline, typically ambulates with cane or walker), HTN, prior CVA, and BPH presents to [facility] ED with altered mental status in the setting of 2 falls and urinary incontinence. He was found to have a right frontal IPH and concern for seizures, he was admitted to NSICU for cardene gtt and q1h neurochecks; he was later then transferred to [facility] NSICU then stepped down to stroke mgmt. Medicine comgmt consulted for remainder of care.    Recommendation:  #Neuro- Defer care to primary team for right frontal IPH and c/f provoked seizures, begin atorvastatin 80mg qd  #Cardiovascular- long standing persistent afib: c/w rate controll w/lopressor 25mg BID, defer AC given new IPH, HTN: BP range per primary team, if above goal can increased hydral to 50mg TID w/hold parameters  #Pulmonary- ALEJO  #GI- c/w bowel regimen  #Renal- Rhabdo: elevated CK, UA + blood, no evidence of LUC, trend renal perofrmance  #Endocrine- ALEJO  #Heme/Onc-Normocytic Anemia: no evidence of acute bleed likely anemia of chronic inflammation, please obtain iron panel, transfuse if hgb<7, maintain active T/S  #ID-ALEJO  #MSK-ALEJO    Dispo: AIR   87yM Catonese speaking male pmhx of dementia (oriented x 2 at baseline, typically ambulates with cane or walker), HTN, prior CVA, and BPH presents to [facility] ED with altered mental status in the setting of 2 falls and urinary incontinence. He was found to have a right frontal IPH and concern for seizures, he was admitted to NSICU for cardene gtt and q1h neurochecks; he was later then transferred to [facility] NSICU then stepped down to stroke mgmt. Medicine comgmt consulted for remainder of care.    Recommendation:  #Neuro- Defer care to primary team for right frontal IPH and c/f provoked seizures, begin atorvastatin 80mg qd  #Cardiovascular- long standing persistent afib: c/w rate controll w/lopressor 25mg BID, defer AC given new IPH, HTN: BP range per primary team, if above goal can increased hydral to 50mg TID w/hold parameters  Myocardial injury: likely demand given dowtrending CE, deferring ASA/plavix/heparin d/t IPH, please obtain TTE w/bubble  #Pulmonary- ALEJO  #GI- c/w bowel regimen  #Renal- Rhabdo: elevated CK, UA + blood, no evidence of LUC, trend renal perofrmance  #Endocrine- ALEJO  #Heme/Onc-Normocytic Anemia: no evidence of acute bleed likely anemia of chronic inflammation, please obtain iron panel, transfuse if hgb<7, maintain active T/S  #ID-ALEJO  #MSK-ALEJO    Dispo: AIR   87yM Catonese speaking male pmhx of dementia (oriented x 2 at baseline, typically ambulates with cane or walker), HTN, prior CVA, and BPH presents to [facility] ED with altered mental status in the setting of 2 falls and urinary incontinence. He was found to have a right frontal IPH and concern for seizures, he was admitted to NSICU for cardene gtt and q1h neurochecks; he was later then transferred to [facility] NSICU then stepped down to stroke mgmt. Medicine comgmt consulted for remainder of care.    Recommendation:  #Neuro- Defer care to primary team for right frontal IPH and c/f provoked seizures, begin atorvastatin 80mg qd  #Cardiovascular- long standing persistent afib: c/w rate control w/lopressor 25mg BID, defer AC given new IPH, HTN: BP range per primary team, if above goal can increased hydral to 50mg TID w/hold parameters  Myocardial injury: likely demand ischemia given downtrending CE, deferring ASA/plavix/heparin d/t IPH, TTE unremarkable for WMA  #Pulmonary- ALEJO  #GI- c/w bowel regimen  #Renal- Rhabdo: mildly elevated CK, UA + blood, no evidence of LUC, trend renal performance  #Endocrine- ALEJO  #Heme/Onc-Normocytic Anemia: no evidence of acute bleed likely anemia of chronic inflammation, please obtain iron panel, transfuse if hgb<7, maintain active T/S  #ID-ALEJO  #MSK-ALEJO    Dispo: AIR   87yM Catonese speaking male pmhx of dementia (oriented x 2 at baseline, typically ambulates with cane or walker), HTN, prior CVA, and BPH presents to [facility] ED with altered mental status in the setting of 2 falls and urinary incontinence. He was found to have a right frontal IPH and concern for seizures, he was admitted to NSICU for cardene gtt and q1h neurochecks; he was later then transferred to [facility] NSICU then stepped down to stroke mgmt. Medicine comgmt consulted for remainder of care.    Recommendation:  #Neuro- Defer care to primary team for right frontal IPH and c/f provoked seizures, begin atorvastatin 80mg qd  #Cardiovascular- long standing persistent afib: c/w rate control w/lopressor 25mg BID, defer AC given new IPH, HTN: BP range per primary team, if above goal can increased hydral to 50mg TID w/hold parameters  Myocardial injury: likely demand ischemia given downtrending CE, deferring ASA/plavix/heparin d/t IPH, TTE unremarkable for WMA  #Pulmonary- ALEJO  #GI- c/w bowel regimen  #Renal- Rhabdo: mildly elevated CK, UA + blood, no evidence of LUC, trend renal performance  #Endocrine- ALEJO  #Heme/Onc-Normocytic Anemia: no evidence of acute bleed likely anemia of chronic inflammation, please obtain iron panel, transfuse if hgb<7, maintain active T/S  #ID-ALEJO  #MSK-ALEJO    Dispo: AIR    Plan discussed with primary team 87yM Catonese speaking male pmhx of dementia (oriented x 2 at baseline, typically ambulates with cane or walker), HTN, prior CVA, and BPH presents to [facility] ED with altered mental status in the setting of 2 falls and urinary incontinence. He was found to have a right frontal IPH and concern for seizures, he was admitted to NSICU for cardene gtt and q1h neurochecks; he was later then transferred to [facility] NSICU then stepped down to stroke mgmt. Medicine comgmt consulted for remainder of care.    Recommendation:  #Neuro- Defer care to primary team for right frontal IPH and c/f provoked seizures, begin atorvastatin 80mg qd, Dementia: OOBTC, light exposure  B/L mittens d/t line pulling: deescalate if patient no longer pulling lines   #Cardiovascular- long standing persistent afib: c/w rate control w/lopressor 25mg BID, defer AC given new IPH, HTN: BP range per primary team, if above goal can increased hydral to 50mg TID w/hold parameters  Myocardial injury: likely demand ischemia given downtrending CE, deferring ASA/plavix/heparin d/t IPH, TTE unremarkable for WMA  #Pulmonary- ALEJO  #GI- c/w bowel regimen  #Renal- Rhabdo: mildly elevated CK, UA + blood, no evidence of LUC, trend renal performance  #Endocrine- ALEJO  #Heme/Onc-Normocytic Anemia: no evidence of acute bleed likely anemia of chronic inflammation, please obtain iron panel, transfuse if hgb<7, maintain active T/S  #ID-ALEJO  #MSK-ALEJO    Dispo: AIR    Plan discussed with primary team

## 2023-04-22 NOTE — CONSULT NOTE ADULT - ASSESSMENT
Neurology     87 year old Cantonese speaking male with PMH of dementia (AOx2 @ baseline, ambulates w/ cane or walker), HTN, prior CVA (on Plavix), BPH presented to [place] ED after unwitnessed falls x2 w/ unknown downtime. CTH showed right frontal cortical IPH, transferred to [facility] for further workup. Stability scan completed and bleed remains stable, patient transferred from NSICU to Stroke Telemetry for further workup     Neuro     #CVA workup   - continue to hold home Plavix   - q4hr stroke neuro checks and vitals   - obtain MRI Brain without contrast to assess for underlying etiology of bleed   - HCT Results  4/20: Right frontal acute parenchymal hemorrhage   - HCT Results 4/21: Stable right frontal intraparenchymal hematoma   - CTA Results: Questionable FMD involving the distal right internal carotid artery   - Continue Keppra 500 BID   - EEG in place, likely will d/c after 24 hours, no reported seizure activity   - Pain control w/ Tylenol   - Stroke education        Cards     #HTN     - -160   - Restarted home hydralazine 25mg daily  - Added lisinopril 10mg today after BP still > 160  - TTE with bubble: Normal LV systolic function. No PFO.     #Elevated troponin   -Rule out NSTEMI   -Troponin downtrending 0.05 > 0.04 > 0.03     #HLD   - LDL results: 89   - Holding statin in setting of IPH    Pulm   - Call provider if SPO2 < 94%   - CT PE protocol completed at [place] negative         GI   #Nutrition/Fluids/Electrolytes    - replete K<4 and Mg <2   - s/p bedside swallow eval with SLP, recommending pureed diet w/ thin liquids   - IVF: NS @ 100cc/hr     #Gallbladder wall thickening  - Patient asymptomatic, negative burns's sign  - CT abd/pelvis: Cholelitiasis w/ bladder wall thickening   - abdominal US w/ possible cholecystitis  - HIDA scan pending          Renal   - NS @ 100cc/hr, s/p 150cc 3% bolus   - Patient with unknown down time, CK elevated c/f rhabdo   - Continue to trend CK           Infectious Disease   - Afebrile  - Continue to monitor for leukocytosis    Endocrine   - A1C results: 5.1     - TSH results: 1.76     DVT Prophylaxis   - Holding lovenox sq  - SCDs for DVT prophylaxis

## 2023-04-22 NOTE — PROGRESS NOTE ADULT - ASSESSMENT
Assessment and Plan:   · Assessment	  87 year old Cantonese speaking male with PMH of dementia (AOx2 @ baseline, ambulates w/ cane or walker), HTN, prior CVA (on Plavix), BPH presented to [place] ED after unwitnessed falls x2 w/ unknown downtime. CTH showed right frontal cortical IPH, transferred to [facility] for further workup. Stability scan completed and bleed remains stable, patient transferred from NSICU to Stroke Telemetry for further workup           Neuro     #CVA workup   - continue to hold home Plavix (plan to f/u with stroke Neuro outpt about resuming Plavix).  - q4hr stroke neuro checks and vitals   - obtain MRI Brain without contrast to assess for underlying etiology of bleed   - HCT Results  4/20: Right frontal acute parenchymal hemorrhage   - HCT Results 4/21: Stable right frontal intraparenchymal hematoma   - CTA Results: Questionable FMD involving the distal right internal carotid artery   - Continue w/Keppra 500 BID X 7 days for Sz PPx(4/21-4/27)  - vEEG D/C'd- Negative   - Pain control w/ Tylenol   - Stroke education        Cards     #HTN     - -160   - Restarted home hydralazine 25mg daily on 4/21 and increased to 50mg Q8H on 4/22 as pts BP noted to be persistently >170's.  - Added lisinopril 10mg today after BP still > 160  - TTE with bubble: Normal LV systolic function. No PFO.     #Elevated troponin   -Rule out NSTEMI   -Troponin downtrending 0.05 > 0.04 > 0.03 , likely demand ischemia, EKG w/ no new ST elevations, pAF and no c/o CP.    #HLD   - LDL results: 89   - Holding statin in setting of IPH    Pulm   - Call provider if SPO2 < 94%   - CT PE protocol completed at [place] negative         GI   #Nutrition/Fluids/Electrolytes    - replete K<4 and Mg <2   - s/p bedside swallow eval with SLP, recommending pureed diet w/ thin liquids   - IVF: None  - Downtrending CK    #Gallbladder wall thickening  - Patient asymptomatic, negative burns's sign  - CT abd/pelvis: Cholelitiasis w/ bladder wall thickening   - abdominal US w/ possible cholecystitis- can f/u outpt w/ GI or Sx for poss HIDA scan i/s/o incidental abd U/S finding. Pt afebrile, LFTs WNL, benign abd exam.        Renal   - Patient with unknown down time, CK elevated c/f rhabdo   - Downtrending CK, IVF D/C'd, encourage PO intake.    Infectious Disease   - Afebrile  - Continue to monitor for leukocytosis    Endocrine   - A1C results: 5.1   - TSH results: 1.76     DVT Prophylaxis   - Started on SQL .  - SCDs for DVT prophylaxis          IDR Goals: Goals reviewed at interdisciplinary rounds with case management, social work, physical therapy, occupational therapy, and speech language pathology.      Please see specific therapy  notes for in depth goals.         Dispo: Acute rehab        Discussed daily hospital plans and goals with patient and family at bedside.            Discussed with Neurology Attending Dr. Mas during rounds.     Assessment and Plan:   · Assessment	  87 year old Cantonese speaking male with PMH of dementia (AOx2 @ baseline, ambulates w/ cane or walker), HTN, prior CVA (on Plavix), BPH presented to [place] ED after unwitnessed falls x2 w/ unknown downtime. CTH showed right frontal cortical IPH, transferred to [facility] for further workup. Stability scan completed and bleed remains stable, patient transferred from NSICU to Stroke Telemetry for further workup           Neuro     #CVA workup   - continue to hold home Plavix (plan to f/u with stroke Neuro outpt about resuming Plavix).  - q4hr stroke neuro checks and vitals   - obtain MRI Brain without contrast to assess for underlying etiology of bleed   - HCT Results  4/20: Right frontal acute parenchymal hemorrhage   - HCT Results 4/21: Stable right frontal intraparenchymal hematoma   - CTA Results: Questionable FMD involving the distal right internal carotid artery   - Continue w/Keppra 500 BID X 7 days for Sz PPx(4/21-4/27)  - vEEG D/C'd- Negative   - Pain control w/ Tylenol   - Stroke education        Cards     #HTN     - -160   - Restarted home hydralazine 25mg daily on 4/21 and increased to 50mg Q8H on 4/22 as pts BP noted to be persistently >170's.  - Added lisinopril 10mg today after BP still > 160  - TTE with bubble: Normal LV systolic function. No PFO.     #Elevated troponin   -Rule out NSTEMI   -Troponin downtrending 0.05 > 0.04 > 0.03 , likely demand ischemia, EKG w/ no new ST elevations, pAF and no c/o CP.  - COM: pAF  - Consider referral for watchmann procedure outpt, but likely will require AC.    #HLD   - LDL results: 89   - Holding statin in setting of IPH    Pulm   - Call provider if SPO2 < 94%   - CT PE protocol completed at [place] negative         GI   #Nutrition/Fluids/Electrolytes    - replete K<4 and Mg <2   - s/p bedside swallow eval with SLP, recommending pureed diet w/ thin liquids   - IVF: None  - Downtrending CK    #Gallbladder wall thickening  - Patient asymptomatic, negative burns's sign  - CT abd/pelvis: Cholelitiasis w/ bladder wall thickening   - abdominal US w/ possible cholecystitis- can f/u outpt w/ GI or Sx for poss HIDA scan i/s/o incidental abd U/S finding. Pt afebrile, LFTs WNL, benign abd exam.        Renal   - Patient with unknown down time, CK elevated c/f rhabdo   - Downtrending CK, IVF D/C'd, encourage PO intake.    Infectious Disease   - Afebrile  - Continue to monitor for leukocytosis    Endocrine   - A1C results: 5.1   - TSH results: 1.76     DVT Prophylaxis   - Started on SQL .  - SCDs for DVT prophylaxis          IDR Goals: Goals reviewed at interdisciplinary rounds with case management, social work, physical therapy, occupational therapy, and speech language pathology.      Please see specific therapy  notes for in depth goals.         Dispo: Acute rehab        Discussed daily hospital plans and goals with patient and family at bedside.            Discussed with Neurology Attending Dr. Mas during rounds.

## 2023-04-22 NOTE — CHART NOTE - NSCHARTNOTEFT_GEN_A_CORE
Further collateral obtained from patient's daughters who are at bedside today. The patient has known atrial fibrillation and to their knowledge he has only ever been on clopidigrel for blood thinning purposes (will obtain further collateral from outpatient neurologist and cardiologist). 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 From the documentation on chart review he was recommended to start Eliquis 4 weeks from discharge at the time, but unknown it was ever initiated or what his outpatient workup showed.   His daughter, who lives in the same house as him, says that he normally checks his blood pressure every day and records it. Over the last couple of weeks he has grown more confused and was unable to remember how to check his blood pressure or how to document it. On the day of admission his daughter heard the patient fall and immediately went to check on him, she found him trying to use the urinal. Unclear if he hit his head. He also had Further collateral obtained from patient's daughters who are at bedside today. The patient has known atrial fibrillation and to their knowledge he has only ever been on clopidigrel for blood thinning purposes (will obtain further collateral from outpatient neurologist and cardiologist). 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 From the documentation on chart review he was recommended to start Eliquis 4 weeks from discharge at the time, but unknown it was ever initiated or what his outpatient workup showed.   His daughter, who lives in the same house as him, says that he normally checks his blood pressure every day and records it. Over the last couple of weeks he has grown more confused and was unable to remember how to check his blood pressure or how to document it. On the day of admission his daughter heard the patient fall and immediately went to check on him, she found him trying to use the urinal. Unclear if he hit his head. The daughters also saw two events that appeared to be seizure like activity where bilateral arms and legs appeared tense and contracted with shaking and altered mental status prior to his presentation to [place].

## 2023-04-22 NOTE — CONSULT NOTE ADULT - SUBJECTIVE AND OBJECTIVE BOX
Patient is a 87y old  Male who presents with a chief complaint of R frontal IPH (2023 09:07)      HPI:  Patient is an 86 y/o Catonese speaking male with PMH of dementia (oriented x 2 at baseline, typically ambulates with cane or walker), HTN, prior CVA- on plavix. Last taken on . BPH. He presents with altered mental status in the setting of 2 falls. Per ED records, patient's daughters stated that he had recent insomnia. Subsequently, trazodone increased from 50 mg to 100 mg before bedtime 2 days ago. Pt had 2 falls today, both unwitnessed, unknown downtime. Daughter states patient had urinary incontinence. He presented to [place] ED by EMS. 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 He was started on Cardene and given Keppra. Last dose of Plavix was  and with possible NSTEMI, plavix not reversed. Currently denies chest pain, palpitations shortness of breath. Also had trauma imaging including CT Cspine/ chest/abd/pelvis. Transferred to [facility] for further management. ICH score 1, NIHSS 1. GCS 13. (2023 23:42)    PAST MEDICAL & SURGICAL HISTORY:  BPH (benign prostatic hyperplasia)      Irregular heart rate      Lumbar disc herniation      Hypertension      Stroke      No significant past surgical history        MEDICATIONS  (STANDING):  chlorhexidine 2% Cloths 1 Application(s) Topical <User Schedule>  donepezil 5 milliGRAM(s) Oral at bedtime  finasteride 5 milliGRAM(s) Oral daily  hydrALAZINE 50 milliGRAM(s) Oral every 8 hours  levETIRAcetam  IVPB 500 milliGRAM(s) IV Intermittent every 12 hours  lisinopril 10 milliGRAM(s) Oral daily  metoprolol tartrate 25 milliGRAM(s) Oral two times a day  polyethylene glycol 3350 17 Gram(s) Oral daily  senna 2 Tablet(s) Oral at bedtime  tamsulosin 0.4 milliGRAM(s) Oral at bedtime    MEDICATIONS  (PRN):  acetaminophen     Tablet .. 650 milliGRAM(s) Oral every 6 hours PRN Temp greater or equal to 38C (100.4F), Mild Pain (1 - 3)          FAMILY HISTORY:  No pertinent family history in first degree relatives        CBC Full  -  ( 2023 05:30 )  WBC Count : 7.61 K/uL  RBC Count : 3.29 M/uL  Hemoglobin : 10.8 g/dL  Hematocrit : 32.4 %  Platelet Count - Automated : 148 K/uL  Mean Cell Volume : 98.5 fl  Mean Cell Hemoglobin : 32.8 pg  Mean Cell Hemoglobin Concentration : 33.3 gm/dL  Auto Neutrophil # : 6.24 K/uL  Auto Lymphocyte # : 0.67 K/uL  Auto Monocyte # : 0.62 K/uL  Auto Eosinophil # : 0.01 K/uL  Auto Basophil # : 0.03 K/uL  Auto Neutrophil % : 82.1 %  Auto Lymphocyte % : 8.8 %  Auto Monocyte % : 8.1 %  Auto Eosinophil % : 0.1 %  Auto Basophil % : 0.4 %          137  |  105  |  10  ----------------------------<  143<H>  3.2<L>   |  22  |  0.75    Ca    7.8<L>      2023 05:30  Phos  2.7     -  Mg     2.0         TPro  6.8  /  Alb  4.1  /  TBili  0.7  /  DBili  x   /  AST  46<H>  /  ALT  20  /  AlkPhos  52  -      Urinalysis Basic - ( 2023 18:00 )    Color: Yellow / Appearance: Clear / S.015 / pH: x  Gluc: x / Ketone: Negative  / Bili: Negative / Urobili: Negative   Blood: x / Protein: 30 mg/dL / Nitrite: Negative   Leuk Esterase: Negative / RBC: 10-25 /HPF / WBC 3-5 /HPF   Sq Epi: x / Non Sq Epi: x / Bacteria: Trace /HPF        Radiology :     < from: CT Head No Cont (23 @ 01:35) >  ACC: [id number] EXAM:  CT BRAIN   ORDERED BY: RAKAN OVERTON     PROCEDURE DATE:  2023          INTERPRETATION:  Clinical history/reason for exam: Right frontal   intraparenchymal hematoma, follow-up    Technique: Multiple contiguous axial CTimages of the head were obtained   from the base of the skull to the vertex without the administration of IV   contrast. Coronal and sagittal reformatted images were obtained.    Comparison:CT head 2023    Findings:    There is hyperdensity within the intracranial vessels due to recent   contrast administration. There is no significant change in the right   frontal intraparenchymal hematoma which measures 2.8 cm AP by 1.8 cm   transverse by 2.8 cm craniocaudal. There is surrounding vasogenicedema   and mild mass effect. No herniation.    The ventricles are stable in size and configuration. No acute   hydrocephalus. No new intraparenchymal hemorrhage..    No interval demarcated territorial infarction..    No acute calvarial fracture..    There is sclerosis of the left mastoid air cells and partial   opacification of the right mastoid air cells. The paranasal sinuses are   well-aerated.    Impression:    Stable right frontal intraparenchymal hematoma..      < end of copied text >          REVIEW OF SYSTEMS:      CONSTITUTIONAL: No fever, weight loss, or fatigue  EYES: No eye pain, visual disturbances, or discharge  ENMT:  No difficulty hearing, tinnitus, vertigo; No sinus or throat pain  NECK: No pain or stiffness  BREASTS: No pain, masses, or nipple discharge  RESPIRATORY: No cough, wheezing, chills or hemoptysis; No shortness of breath  CARDIOVASCULAR: No chest pain, palpitations, dizziness, or leg swelling  GASTROINTESTINAL: No abdominal or epigastric pain. No nausea, vomiting, or hematemesis; No diarrhea or constipation. No melena or hematochezia.  GENITOURINARY: No dysuria, frequency, hematuria, or incontinence  NEUROLOGICAL: per hpi   SKIN: No itching, burning, rashes, or lesions   LYMPH NODES: No enlarged glands  ENDOCRINE: No heat or cold intolerance; No hair loss  MUSCULOSKELETAL: No joint pain or swelling; No muscle, back, or extremity pain  PSYCHIATRIC: No depression, anxiety, mood swings, or difficulty sleeping  HEME/LYMPH: No easy bruising, or bleeding gums  ALLERGY AND IMMUNOLOGIC: No hives or eczema  VASCULAR: no swelling , erythema          Vital Signs Last 24 Hrs  T(C): 36.9 (2023 09:19), Max: 37.2 (2023 18:06)  T(F): 98.4 (2023 09:19), Max: 98.9 (2023 18:06)  HR: 94 (2023 11:44) (66 - 94)  BP: 149/77 (2023 11:44) (139/67 - 191/86)  BP(mean): 106 (2023 11:44) (96 - 129)  RR: 20 (2023 11:44) (11 - 20)  SpO2: 97% (2023 11:44) (97% - 98%)    Parameters below as of 2023 11:44  Patient On (Oxygen Delivery Method): room air            Physical Exam : frail 87 y o man lying comfortably in semi Tsang's position , awake , alert , NAD     Head : normocephalic , atraumatic    Eyes : PERRLA , EOMI , no nystagmus , sclera anicteric    ENT : neg nasal discharge , uvula midline , no oropharyngeal erythema / exudate    Face: no ecchymosis , hematoma     Neck : supple , negative JVD , negative carotid bruits , no thyromegaly    Chest : CTA bilaterally     Cardiovascular : regular rate and rhythm , neg murmurs / rubs / gallops    Abdomen : soft , non distended , non tender to palpation in all 4 quadrants ,  normal bowel sounds     Extremities : WWP , neg cyanosis /clubbing / edema     Neurologic Exam :    Alert and oriented  x 3    Cranial Nerves:     II :                         pupils equal , round and reactive to light , visual fields intact   III/ IV/VI :              extraocular movements intact , neg nystagmus , neg ptosis  V :                        facial sensation intact , V1-3 normal  VII :                      face symmetric , no droop , normal eye closure and smile  VIII :                     hearing intact to finger rub bilaterally  IX and X :             no hoarseness , gag intact , palate/ uvula rise symmetrically  XI :                       SCM / trapezius strength intact bilateral  XII :                      no tongue deviation    Motor Exam:          Right UE:               no focal weakness ,  > 3+/5 throughout  , no drift                             Left UE:                 no focal weakness ,  > 3+/5 throughout  , no drift         Right LE:                no focal weakness ,  > 3+/5 throughout        Left LE:                  no focal weakness ,  > 3+/5 throughout         Sensation :         intact to light touch x 4 extremities       DTR :                     biceps/brachioradialis : equal                              patella/ankle : equal      Gait :  not tested      PM&R Impression : s/p stable Right frontal [facility]       Recommendations / Plan :       1) Physical / Occupational therapy focusing on therapeutic exercises , equipment evaluation , bed mobility/transfer out of bed evaluation , progressive ambulation with assistive devices prn .    2) Current disposition plan recommendation  :   acute rehab placement

## 2023-04-22 NOTE — EEG REPORT - NS EEG TEXT BOX
Patient Name:	MARIELY QUINTEROS    :	1935  MRN:	[id number]    Study Start Date/Time:  2023, 11:39:48 PM  Study End Date/Time:    Referred by: select    Brief clinical history:  87 y old man with hypertension, BPH, remote CVA, dementia.  Now with episodes of falling and altered mental status. Found to have new frontal intraparenchymal hemorrhage.  Prolonged video EEG obtained to rule out subclinical seizures as well as to investigate for seizures or markers of epilepsy.    Diagnosis code:   R46.89 Spell of abnormal behavior    Pertinent medications:  Levetiracetam    Acquisition details:  Electroencephalography was acquired using a minimum of 21 channels on an [employer] system v 9.3.1 with electrode placement according to the standard International 10-20 system following ACNS (American Clinical Neurophysiology Society) guidelines for Long-Term Video EEG monitoring.  Anterior temporal T1 and T2 electrodes were utilized whenever possible.   The [employer] automated spike & seizure detections were all reviewed in detail, in addition to extensive portions of raw EEG.      Day 1  2023 from 11:39:48 PM to next morning at 07:00:00 AM  Background:  continuous, with predominantly alpha > theta frequencies.  Symmetry:  No persistent asymmetries of voltage or frequency.  Posterior Dominant Rhythm:  7.5 Hz symmetric, well-organized, and well-modulated.  Organization: Rudimentary.  Voltage:  Normal (20+ uV)  Variability: Yes. 		Reactivity: Yes.  N2 sleep: Symmetric, synchronous spindles and K complexes.  Spontaneous Activity:  No epileptiform discharges.  Periodic/rhythmic activity:  None  Events:  No electrographic seizures or significant clinical events.  Provocations:  Hyperventilation and Photic stimulation: was not performed.    Daily Summary:    Continuous Mild generalized   slowing suggestive of a Mild degree of diffuse or multifocal  dysfunction.  No epileptiform activity and no significant clinical events occurred.    Aneudy Gilliland MD  Attending Neurologist, [facility]      Day 2    2023 from 07:00:00 AM to 2023 at 07:00:00 AM  Background:  continuous, with predominantly alpha > theta frequencies.  Symmetry:  No persistent asymmetries of voltage or frequency.  Posterior Dominant Rhythm:  7.5 Hz symmetric, well-organized, and well-modulated.  Organization: Rudimentary.  Voltage:  Normal (20+ uV)  Variability: Yes. 		Reactivity: Yes.  N2 sleep: Symmetric, synchronous spindles and K complexes.  Spontaneous Activity:  No epileptiform discharges.  Periodic/rhythmic activity:  None  Events:  No electrographic seizures or significant clinical events.  Provocations:  Hyperventilation and Photic stimulation: was not performed.    Daily Summary:    Continuous Mild generalized   slowing suggestive of a Mild degree of diffuse or multifocal  dysfunction.  No epileptiform activity and no significant clinical events occurred.    Janina Rooney MD  Attending Neurologist, [facility]

## 2023-04-23 ENCOUNTER — TRANSCRIPTION ENCOUNTER (OUTPATIENT)
Age: 88
End: 2023-04-23

## 2023-04-23 LAB
AMYLASE P1 CFR SERPL: 72 U/L — SIGNIFICANT CHANGE UP (ref 25–125)
ANION GAP SERPL CALC-SCNC: 10 MMOL/L — SIGNIFICANT CHANGE UP (ref 5–17)
BUN SERPL-MCNC: 10 MG/DL — SIGNIFICANT CHANGE UP (ref 7–23)
CALCIUM SERPL-MCNC: 8.2 MG/DL — LOW (ref 8.4–10.5)
CHLORIDE SERPL-SCNC: 103 MMOL/L — SIGNIFICANT CHANGE UP (ref 96–108)
CO2 SERPL-SCNC: 25 MMOL/L — SIGNIFICANT CHANGE UP (ref 22–31)
CREAT SERPL-MCNC: 0.73 MG/DL — SIGNIFICANT CHANGE UP (ref 0.5–1.3)
EGFR: 88 ML/MIN/1.73M2 — SIGNIFICANT CHANGE UP
GLUCOSE SERPL-MCNC: 124 MG/DL — HIGH (ref 70–99)
HCT VFR BLD CALC: 35.4 % — LOW (ref 39–50)
HGB BLD-MCNC: 11.9 G/DL — LOW (ref 13–17)
LIDOCAIN IGE QN: 19 U/L — SIGNIFICANT CHANGE UP (ref 7–60)
MAGNESIUM SERPL-MCNC: 1.9 MG/DL — SIGNIFICANT CHANGE UP (ref 1.6–2.6)
MCHC RBC-ENTMCNC: 33.1 PG — SIGNIFICANT CHANGE UP (ref 27–34)
MCHC RBC-ENTMCNC: 33.6 GM/DL — SIGNIFICANT CHANGE UP (ref 32–36)
MCV RBC AUTO: 98.3 FL — SIGNIFICANT CHANGE UP (ref 80–100)
NRBC # BLD: 0 /100 WBCS — SIGNIFICANT CHANGE UP (ref 0–0)
PHOSPHATE SERPL-MCNC: 2.5 MG/DL — SIGNIFICANT CHANGE UP (ref 2.5–4.5)
PLATELET # BLD AUTO: 157 K/UL — SIGNIFICANT CHANGE UP (ref 150–400)
POTASSIUM SERPL-MCNC: 3.9 MMOL/L — SIGNIFICANT CHANGE UP (ref 3.5–5.3)
POTASSIUM SERPL-SCNC: 3.9 MMOL/L — SIGNIFICANT CHANGE UP (ref 3.5–5.3)
RBC # BLD: 3.6 M/UL — LOW (ref 4.2–5.8)
RBC # FLD: 12.4 % — SIGNIFICANT CHANGE UP (ref 10.3–14.5)
SODIUM SERPL-SCNC: 138 MMOL/L — SIGNIFICANT CHANGE UP (ref 135–145)
WBC # BLD: 7.51 K/UL — SIGNIFICANT CHANGE UP (ref 3.8–10.5)
WBC # FLD AUTO: 7.51 K/UL — SIGNIFICANT CHANGE UP (ref 3.8–10.5)

## 2023-04-23 PROCEDURE — 99232 SBSQ HOSP IP/OBS MODERATE 35: CPT

## 2023-04-23 RX ORDER — LIDOCAINE 4 G/100G
1 CREAM TOPICAL EVERY 24 HOURS
Refills: 0 | Status: DISCONTINUED | OUTPATIENT
Start: 2023-04-23 | End: 2023-04-28

## 2023-04-23 RX ORDER — HYDRALAZINE HCL 50 MG
75 TABLET ORAL EVERY 8 HOURS
Refills: 0 | Status: DISCONTINUED | OUTPATIENT
Start: 2023-04-23 | End: 2023-04-23

## 2023-04-23 RX ORDER — MAGNESIUM OXIDE 400 MG ORAL TABLET 241.3 MG
400 TABLET ORAL ONCE
Refills: 0 | Status: COMPLETED | OUTPATIENT
Start: 2023-04-23 | End: 2023-04-23

## 2023-04-23 RX ORDER — METOPROLOL TARTRATE 50 MG
50 TABLET ORAL
Refills: 0 | Status: DISCONTINUED | OUTPATIENT
Start: 2023-04-23 | End: 2023-04-23

## 2023-04-23 RX ORDER — HYDRALAZINE HCL 50 MG
25 TABLET ORAL ONCE
Refills: 0 | Status: COMPLETED | OUTPATIENT
Start: 2023-04-23 | End: 2023-04-23

## 2023-04-23 RX ORDER — ACETAMINOPHEN 500 MG
650 TABLET ORAL EVERY 6 HOURS
Refills: 0 | Status: DISCONTINUED | OUTPATIENT
Start: 2023-04-23 | End: 2023-04-28

## 2023-04-23 RX ORDER — POTASSIUM CHLORIDE 20 MEQ
10 PACKET (EA) ORAL ONCE
Refills: 0 | Status: DISCONTINUED | OUTPATIENT
Start: 2023-04-23 | End: 2023-04-23

## 2023-04-23 RX ORDER — METOPROLOL TARTRATE 50 MG
50 TABLET ORAL EVERY 12 HOURS
Refills: 0 | Status: DISCONTINUED | OUTPATIENT
Start: 2023-04-24 | End: 2023-04-28

## 2023-04-23 RX ORDER — POTASSIUM CHLORIDE 20 MEQ
10 PACKET (EA) ORAL ONCE
Refills: 0 | Status: COMPLETED | OUTPATIENT
Start: 2023-04-23 | End: 2023-04-23

## 2023-04-23 RX ORDER — METOPROLOL TARTRATE 50 MG
25 TABLET ORAL ONCE
Refills: 0 | Status: COMPLETED | OUTPATIENT
Start: 2023-04-23 | End: 2023-04-23

## 2023-04-23 RX ORDER — HYDRALAZINE HCL 50 MG
100 TABLET ORAL EVERY 8 HOURS
Refills: 0 | Status: DISCONTINUED | OUTPATIENT
Start: 2023-04-23 | End: 2023-04-24

## 2023-04-23 RX ADMIN — CHLORHEXIDINE GLUCONATE 1 APPLICATION(S): 213 SOLUTION TOPICAL at 06:50

## 2023-04-23 RX ADMIN — LISINOPRIL 10 MILLIGRAM(S): 2.5 TABLET ORAL at 06:48

## 2023-04-23 RX ADMIN — Medication 75 MILLIGRAM(S): at 14:24

## 2023-04-23 RX ADMIN — Medication 50 MILLIGRAM(S): at 06:48

## 2023-04-23 RX ADMIN — LEVETIRACETAM 400 MILLIGRAM(S): 250 TABLET, FILM COATED ORAL at 06:48

## 2023-04-23 RX ADMIN — ENOXAPARIN SODIUM 40 MILLIGRAM(S): 100 INJECTION SUBCUTANEOUS at 18:01

## 2023-04-23 RX ADMIN — MAGNESIUM OXIDE 400 MG ORAL TABLET 400 MILLIGRAM(S): 241.3 TABLET ORAL at 12:38

## 2023-04-23 RX ADMIN — Medication 25 MILLIGRAM(S): at 06:48

## 2023-04-23 RX ADMIN — TAMSULOSIN HYDROCHLORIDE 0.4 MILLIGRAM(S): 0.4 CAPSULE ORAL at 21:51

## 2023-04-23 RX ADMIN — LIDOCAINE 1 PATCH: 4 CREAM TOPICAL at 18:55

## 2023-04-23 RX ADMIN — Medication 650 MILLIGRAM(S): at 18:01

## 2023-04-23 RX ADMIN — Medication 25 MILLIGRAM(S): at 19:15

## 2023-04-23 RX ADMIN — LEVETIRACETAM 400 MILLIGRAM(S): 250 TABLET, FILM COATED ORAL at 18:02

## 2023-04-23 RX ADMIN — Medication 10 MILLIEQUIVALENT(S): at 12:38

## 2023-04-23 RX ADMIN — Medication 25 MILLIGRAM(S): at 18:01

## 2023-04-23 RX ADMIN — Medication 25 MILLIGRAM(S): at 12:38

## 2023-04-23 RX ADMIN — FINASTERIDE 5 MILLIGRAM(S): 5 TABLET, FILM COATED ORAL at 12:38

## 2023-04-23 RX ADMIN — Medication 650 MILLIGRAM(S): at 14:32

## 2023-04-23 RX ADMIN — LIDOCAINE 1 PATCH: 4 CREAM TOPICAL at 13:57

## 2023-04-23 RX ADMIN — LIDOCAINE 1 PATCH: 4 CREAM TOPICAL at 13:56

## 2023-04-23 RX ADMIN — Medication 650 MILLIGRAM(S): at 23:53

## 2023-04-23 RX ADMIN — Medication 100 MILLIGRAM(S): at 21:51

## 2023-04-23 RX ADMIN — Medication 650 MILLIGRAM(S): at 13:32

## 2023-04-23 RX ADMIN — Medication 650 MILLIGRAM(S): at 19:01

## 2023-04-23 RX ADMIN — DONEPEZIL HYDROCHLORIDE 5 MILLIGRAM(S): 10 TABLET, FILM COATED ORAL at 21:51

## 2023-04-23 NOTE — DISCHARGE NOTE PROVIDER - NSDCMRMEDTOKEN_GEN_ALL_CORE_FT
cholecalciferol 625 mcg (25,000 intl units) oral capsule: 2 orally once a week  donepezil 5 mg oral tablet: 1 orally once a day  finasteride 5 mg oral tablet: 1 tab(s) orally once a day  hydrALAZINE 25 mg oral tablet: 1 tab(s) orally every 8 hours  metoprolol succinate 50 mg oral tablet, extended release: 1 orally once a day  Plavix 75 mg oral tablet: 1 orally once a day  tamsulosin 0.4 mg oral capsule: 1 cap(s) orally once a day  traZODone 100 mg oral tablet: orally   cholecalciferol 625 mcg (25,000 intl units) oral capsule: 2 orally once a week  donepezil 5 mg oral tablet: 1 orally once a day  finasteride 5 mg oral tablet: 1 tab(s) orally once a day  hydrALAZINE 25 mg oral tablet: 1 tab(s) orally every 8 hours  metoprolol succinate 50 mg oral tablet, extended release: 1 orally once a day  tamsulosin 0.4 mg oral capsule: 1 cap(s) orally once a day  traZODone 100 mg oral tablet: orally   acetaminophen 325 mg oral tablet: 2 tab(s) orally every 6 hours  amLODIPine 2.5 mg oral tablet: 1 tab(s) orally every 24 hours  cholecalciferol 625 mcg (25,000 intl units) oral capsule: 2 orally once a week  donepezil 5 mg oral tablet: 1 tab(s) orally once a day (at bedtime)  enoxaparin: 40 milligram(s) subcutaneous once a day  finasteride 5 mg oral tablet: 1 tab(s) orally once a day  hydrALAZINE 50 mg oral tablet: 1 tab(s) orally every 8 hours  levETIRAcetam 500 mg oral tablet: 1 tab(s) orally 2 times a day  lidocaine 4% topical film: Apply topically to affected area once a day (back)  lidocaine 4% topical film: Apply topically to affected area once a day (lower back)  melatonin 5 mg oral tablet: 1 tab(s) orally once a day (at bedtime)  metoprolol tartrate 50 mg oral tablet: 1 tab(s) orally every 12 hours  polyethylene glycol 3350 oral powder for reconstitution: 17 gram(s) orally once a day  QUEtiapine: 12.5 milligram(s) orally once a day (at bedtime) as needed for  agitation  senna leaf extract oral tablet: 2 tab(s) orally once a day (at bedtime)  tamsulosin 0.4 mg oral capsule: 1 cap(s) orally once a day   acetaminophen 325 mg oral tablet: 2 tab(s) orally every 6 hours  amLODIPine 2.5 mg oral tablet: 1 tab(s) orally every 24 hours  cholecalciferol 625 mcg (25,000 intl units) oral capsule: 2 orally once a week  donepezil 5 mg oral tablet: 1 tab(s) orally once a day (at bedtime)  enoxaparin: 40 milligram(s) subcutaneous once a day  finasteride 5 mg oral tablet: 1 tab(s) orally once a day  hydrALAZINE 50 mg oral tablet: 1 tab(s) orally every 8 hours  levETIRAcetam 500 mg oral tablet: 1 tab(s) orally 2 times a day  lidocaine 4% topical film: Apply topically to affected area once a day (back)  lidocaine 4% topical film: Apply topically to affected area once a day (lower back)  melatonin 5 mg oral tablet: 1 tab(s) orally once a day (at bedtime)  metoprolol tartrate 50 mg oral tablet: 1 tab(s) orally once on 4/28/23 at 1PM  polyethylene glycol 3350 oral powder for reconstitution: 17 gram(s) orally once a day  QUEtiapine: 12.5 milligram(s) orally once a day (at bedtime) as needed for  agitation  senna leaf extract oral tablet: 2 tab(s) orally once a day (at bedtime)  tamsulosin 0.4 mg oral capsule: 1 cap(s) orally once a day  traMADol 50 mg oral tablet: 0.5 tab(s) orally every 8 hours As needed Moderate Pain (4 - 6)

## 2023-04-23 NOTE — DISCHARGE NOTE PROVIDER - PROVIDER TOKENS
PROVIDER:[TOKEN:[[id number]]],PROVIDER:[TOKEN:[[id number]]] PROVIDER:[TOKEN:[[id number]],SCHEDULEDAPPT:[09/07/2023],SCHEDULEDAPPTTIME:[09:45 AM]],PROVIDER:[TOKEN:[[id number]],SCHEDULEDAPPT:[05/04/2023],SCHEDULEDAPPTTIME:[09:00 AM],ESTABLISHEDPATIENT:[T]],PROVIDER:[TOKEN:[[id number]],SCHEDULEDAPPT:[05/08/2023],SCHEDULEDAPPTTIME:[11:15 AM]]

## 2023-04-23 NOTE — PROVIDER CONTACT NOTE (OTHER) - ASSESSMENT
pt neuro checks at baseline. VS in flow sheet.
pt calm no c/o headache or pain using 
pt comfortable no agitation
pt not agitated eeg in progress
pt comfortable denies headache

## 2023-04-23 NOTE — DISCHARGE NOTE PROVIDER - CARE PROVIDER_API CALL
Raya Bal)  Neurology  [address] [address]  Phone: [phone]  Fax: [phone]  Follow Up Time:     [facility]  [address] [place] [address]  Phone: [phone]  Fax: [phone]  Follow Up Time:    Raya Bal)  Neurology  [address] [address]  Phone: [phone]  Fax: [phone]  Scheduled Appointment: 09/07/2023 09:45 AM    [place] [id number]  Phone: [phone]  Fax: [phone]  Established Patient  Scheduled Appointment: 05/04/2023 09:00 AM    Reinaldo Chand)  Cardiology; Interventional Cardiology  [address] [place] [address]  Phone: [phone]  Fax: [phone]  Scheduled Appointment: 05/08/2023 11:15 AM

## 2023-04-23 NOTE — DISCHARGE NOTE PROVIDER - HOSPITAL COURSE
Hospital course:  87y Male with PMH     During this hospital course, patient had a (ischemic/hemorrhagic) stroke located in (left/right.....) as seen on (MRI/CT).   The stroke etiology is likely secondary to:  []atrial fibrillation  []small vessel disease from atherosclerotic risk factors  []other:  []etiology workup still in progress    Patient had the following workup done in house:  CT Head:   MR Head Non Contrast:  CT Angio Head:  CT Angio Neck:  []echo  []labs  []other    Physical exam at discharge:    New medications on discharge:  Labs to be followed up:  Imaging to be done as outpatient:  Further outpatient workup:   #Discharge: do not delete    MARIELY QUINTEROS is a 87 year old Cantonese speaking male with PMH of dementia (AOx2 @ baseline, ambulates w/ cane or walker), HTN, prior CVA (on Plavix), BPH presented to [place] ED after unwitnessed falls x2 w/ unknown downtime. 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 for further workup.     Hospital Course by Problem List/Main Diagnoses:    #CVA (cerebrovascular accident).   MRI head: 2.7 cm acute/subacute right frontal lobe hematoma corresponding to CT head. Additional areas of chronic hemorrhage with atrophy. These findings may reflect amyloid angiopathy. No acute infarction.  HCT Results  4/20: Right frontal acute parenchymal hemorrhage.  HCT Results 4/21: Stable right frontal intraparenchymal hematoma.  CTA Results: Questionable FMD involving the distal right internal carotid artery.  vEEG Negative.  - discontinued home Plavix -- f/u w/ PCP and neuro as outpatient to resume  - continue w/ Keppra 500 BID for seizure prophylaxis    #Dementia.   Pt tugging at bundy line, requiring restraints after 5pm, concern for sun downing.  - c/w seroquel 12.5mg PO qhs PRN for agitation  - continue to encourage redirection, reorientation and environment.    #HTN (hypertension).   BPs 120s-140s/50s-80s over last 24 hrs. TTE with bubble: Normal LV systolic function. No PFO.  - continue Hydralazine 50mg Q8  - switch lopressor 50 mg PO BID to toprol 100 mg PO qd    #Elevated troponin.   Troponin downtrending 0.05 > 0.04 > 0.03 , likely demand ischemia, EKG w/ no new ST elevations, pAF and no complaints of chest pain.     #Afib.   Known Hx of paroxysmal Afib. Unclear if pt was on Eliquis prior to 2011(daughters unaware). Was started on plavix after extensive discussion w/pts Neuro and cards post cerebellar bleed in 2011. Plavix currently held for R Frontal IPH. Risk of starting Plavix out weighs benefits given amyloid angiopathy and areas of chronic hemorrhage. CS consulted and states pt will likely need Watchman device in the future as outpatient.   - follow-up with structural heart team/CTS (Dr. Chand) as outpatient    #HLD (hyperlipidemia).   - Holding statin in setting of IPH.    #Back pain.   Age indeterminate T5 and L5 Fx. Pt consistently reports pain; unable to give pain scale. Tramadol 25mg given once at 2100 on 4/24 due to pain.  - continue Standing Tylenol 650mg PO Q6  - continue Lidocaine patch X2(upper and lower back)  - continue tramadol 25 mg PO q8h PRN for moderate pain    #Thickening of wall of gallbladder.   Incidental finding in pan scan for Trauma w/u. Patient asymptomatic, negative bruns's sign. CT abd/pelvis: Cholelitiasis w/ bladder wall thickening. Abdominal US w/ possible cholecystitis- can f/u outpt w/ GI or Sx for poss HIDA scan i/s/o incidental abd U/S finding. Pt afebrile, LFTs WNL, benign abd exam. Lipase/Amylase WNL.  - follow-up with PCP as outpatient    Patient was discharged to: acute rehab    New medications: tylenol, lovenox, keppra, seroquel, melatonin, amlodipine, lidocaine patches, miralax, senna  Changes to old medications: none  Medications that were stopped: plavix    Items to follow up as outpatient: PCP, neuro (stroke), structural heart (CTS) #Discharge: do not delete    The patient is an 87-year-old Cantonese speaking male with a history of atrial fibrillation (previously on a/c, off now due to h/o cerebellar ICH), dementia, hypertension, prior stroke (previously on Plavix), admitted after fall/found down in setting to right cortical ICH.  CTA showed no underling vascular abnormality. MRI consistent with CAA. Suspect bleed is NONTRAUMATIC and related to CAA.  SBP<160, ideally <140- can adjust BP regimen PRN. Continue Keppra for 3 months with repeat EEG in outpatient setting. Patient may be candidate for watchman device in future. Will avoid a/c  and antiplatelet for now given suspected CAA. Goal SBP <140.     Physical Exam on discharge:  -Cranial nerves:   Oriented to place and self  II: hard to assess Visual fields as pt has difficulty following instructions, BTT Intact.  III, IV, VI: Extraocular movements are intact without nystagmus. Pupils equally round and reactive to light.  V:  Facial sensation V1-V3 equal and intact - states he feels being touched.  VII: Face is symmetric with normal eye closure and smile  VIII: Hearing is bilaterally intact to finger rub  IX, X: Uvula is midline and soft palate rises symmetrically  XI: Head turning and shoulder shrug are intact.  XII: Tongue protrudes midline  Motor: Decreased bulk and tone. No pronator drift. Hard to assess strength 2/2 following commands to some extent. B/L UE atleast 4/5, antigravity, able to hold for 10seconds, no drift noted. Bilateral lower extremities 4/5, no drift.   NIHSS: 0    Hospital Course by Problem List/Main Diagnoses:    #CVA (cerebrovascular accident).   MRI head: 2.7 cm acute/subacute right frontal lobe hematoma corresponding to CT head. Additional areas of chronic hemorrhage with atrophy. These findings may reflect amyloid angiopathy. No acute infarction.  HCT Results  4/20: Right frontal acute parenchymal hemorrhage.  HCT Results 4/21: Stable right frontal intraparenchymal hematoma.  CTA Results: Questionable FMD involving the distal right internal carotid artery.  vEEG Negative.  - discontinued home Plavix -- f/u w/ PCP and neuro as outpatient to resume  - continue w/ Keppra 500 BID for seizure prophylaxis    #Dementia.   Pt tugging at bundy line, requiring restraints after 5pm, concern for sun downing.  - c/w seroquel 12.5mg PO qhs PRN for agitation  - continue to encourage redirection, reorientation and environment.    #HTN (hypertension).   - TTE with bubble: Normal LV systolic function. No PFO.  - continue Hydralazine 50mg Q8  - switch lopressor 50 mg PO BID to toprol 100 mg PO qd  - added Amlodipine 2.5mg QD    #Elevated troponin.   Troponin downtrending 0.05 > 0.04 > 0.03 , likely demand ischemia, EKG w/ no new ST elevations, pAF and no complaints of chest pain.     #Afib.   Known Hx of paroxysmal Afib. Unclear if pt was on Eliquis prior to 2011(daughters unaware). Was started on plavix after extensive discussion w/pts Neuro and cards post cerebellar bleed in 2011. Plavix currently held for R Frontal IPH. Risk of starting Plavix out weighs benefits given amyloid angiopathy and areas of chronic hemorrhage. CS consulted and states pt will likely need Watchman device in the future as outpatient.   - follow-up with structural heart team/CTS (Dr. Chand) as outpatient    #HLD (hyperlipidemia).   - Holding statin in setting of IPH.    #Back pain.   Age indeterminate T5 and L5 Fx. Pt consistently reports pain; unable to give pain scale. Tramadol 25mg given once at 2100 on 4/24 due to pain.  - continue Standing Tylenol 650mg PO Q6  - continue Lidocaine patch X2(upper and lower back)  - continue tramadol 25 mg PO q8h PRN for moderate pain    #Thickening of wall of gallbladder.   Incidental finding in pan scan for Trauma w/u. Patient asymptomatic, negative burns's sign. CT abd/pelvis: Cholelitiasis w/ bladder wall thickening. Abdominal US w/ possible cholecystitis- can f/u outpt w/ GI or Sx for poss HIDA scan i/s/o incidental abd U/S finding. Pt afebrile, LFTs WNL, benign abd exam. Lipase/Amylase WNL.  - follow-up with PCP as outpatient    Patient was discharged to: acute rehab    New medications: tylenol, lovenox, keppra, seroquel, melatonin, amlodipine, lidocaine patches, miralax, senna  Changes to old medications: none  Medications that were stopped: plavix    Items to follow up as outpatient: PCP, neuro (stroke), structural heart (CTS)

## 2023-04-23 NOTE — DISCHARGE NOTE PROVIDER - NSDCCPCAREPLAN_GEN_ALL_CORE_FT
PRINCIPAL DISCHARGE DIAGNOSIS  Diagnosis: Cerebral amyloid angiopathy  Assessment and Plan of Treatment: Cerebral amyloid angiopathy (CAA) is a condition in which proteins called amyloid build up on the walls of the arteries in the brain. CAA causes bleeding into the brain (hemorrhagic stroke) and dementia.  There is no known effective treatment. The goal of treatment is to relieve symptoms. In some cases, rehabilitation is needed for weakness or clumsiness. This can include physical, occupational, or speech therapy.  Sometimes, medicines that help improve memory, such as those for Alzheimer disease, are used.  Seizures, also called amyloid spells, may be treated with anti-seizure drugs.      SECONDARY DISCHARGE DIAGNOSES  Diagnosis: HTN (hypertension)  Assessment and Plan of Treatment: Hypertension is the medical term for high blood pressure. Blood pressure refers to the pressure that blood applies to the inner walls of the arteries. Arteries carry blood from the heart to other organs and parts of the body. Untreated high blood pressure increases the strain on the heart and arteries, eventually causing organ damage. High blood pressure increases the risk of heart failure, heart attack (myocardial infarction), stroke, and kidney failure. High blood pressure does not usually cause any symptoms. Treatment of hypertension usually begins with lifestyle changes. Making these lifestyle changes involves little or no risk. Recommended changes often include reducing the amount of salt in your diet, losing weight if you are overweight or obese, avoiding drinking too much alcohol, stopping smoking and exercising at least 30 minutes per day most days of the week. If you are prescribed medication for your hypertension it is important to take these as prescribed to prevent the possible complications of uncontrolled hypertension.    Diagnosis: Paroxysmal atrial fibrillation  Assessment and Plan of Treatment: You have a history of Atrial Fibrillation. This is an irregular, often rapid heart rate that commonly causes poor blood flow. The heart's upper chambers (atria) beat out of coordination with the lower chambers (ventricles). This condition may have no symptoms, but when symptoms do appear they include palpitations, shortness of breath, and fatigue. Treatments include drugs, electrical shock (cardioversion), and minimally invasive surgery (ablation). Continue to take your blood thinner and rate controlling medications as directed. Please continue to take your medications as prescribed and follow up with your primary care doctor and cardiologist in 10-14 days. Please also follow-up with the structural heart team (Dr. Chand) that saw you in the hospital to be evaluated for placement of a Watchman device, which helps prevent blood clots in the heart from entering the bloodstream and causing another stroke.

## 2023-04-23 NOTE — PROVIDER CONTACT NOTE (OTHER) - REASON
blood pressure out of range for parameter
blood pressure not w/in parameter
pt bp above parameter
blood pressure not w/in parameter
blood pressure outside of parameters

## 2023-04-23 NOTE — PROGRESS NOTE ADULT - SUBJECTIVE AND OBJECTIVE BOX
Neurology Stroke Progress Note    INTERVAL HPI/OVERNIGHT EVENTS:    Pt seen an examined @ bedside. [facility]  number #[id number] used. Responds appropriately to the questions asked via the . alert, awake, oriented X 2. NAD. Denies any pain/any coplaints. No HA/CP/Abd pain. Abd non tender. UOP +. Umm PO intake well. Appears euvolemic.       MEDICATIONS  (STANDING):  chlorhexidine 2% Cloths 1 Application(s) Topical <User Schedule>  donepezil 5 milliGRAM(s) Oral at bedtime  enoxaparin Injectable 40 milliGRAM(s) SubCutaneous every 24 hours  finasteride 5 milliGRAM(s) Oral daily  hydrALAZINE 50 milliGRAM(s) Oral every 8 hours  hydrALAZINE Injectable 5 milliGRAM(s) IV Push once  levETIRAcetam  IVPB 500 milliGRAM(s) IV Intermittent every 12 hours  lisinopril 10 milliGRAM(s) Oral daily  metoprolol tartrate 25 milliGRAM(s) Oral two times a day  polyethylene glycol 3350 17 Gram(s) Oral daily  senna 2 Tablet(s) Oral at bedtime  tamsulosin 0.4 milliGRAM(s) Oral at bedtime    MEDICATIONS  (PRN):  acetaminophen     Tablet .. 650 milliGRAM(s) Oral every 6 hours PRN Temp greater or equal to 38C (100.4F), Mild Pain (1 - 3)      Allergies  No Known Allergies      Vital Signs Last 24 Hrs  T(C): 36.9 (2023 09:19), Max: 37.2 (2023 18:06)  T(F): 98.4 (2023 09:19), Max: 98.9 (2023 18:06)  HR: 94 (2023 11:44) (66 - 94)  BP: 149/77 (2023 11:44) (139/67 - 191/86)  BP(mean): 106 (2023 11:44) (96 - 129)  RR: 20 (2023 11:44) (11 - 20)  SpO2: 97% (2023 11:44) (97% - 98%)    Parameters below as of 2023 11:44  Patient On (Oxygen Delivery Method): room air        Physical exam:  General: No acute distress, awake and alert  Eyes: Anicteric sclerae, moist conjunctivae, see below for CNs  Neck: trachea midline, FROM.   Cardiovascular: Irregular (Afib @ times).  Pulmonary: Anterior breath sounds clear bilaterally, No use of accessory muscles.  GI: Abdomen soft, non-distended, non-tender      Neurologic:  -Mental status: Awake, alert, oriented to person(self, able to state his name and age, unable to state birth year), place (states as hospital), and time(able to state month but not year). Speech is fluent with intact naming, repetition, and comprehension, no dysarthria. Decreased attention span and sometimes require repetition of instructions. Follows some simple commands and mimic some.  -Cranial nerves:   II: Visual fields are full to confrontation.  III, IV, VI: Extraocular movements are intact without nystagmus. Pupils equally round and reactive to light.  V:  Facial sensation V1-V3 equal and intact   VII: Face is symmetric with normal eye closure and smile  VIII: Hearing is bilaterally intact to finger rub  IX, X: Uvula is midline and soft palate rises symmetrically  XI: Head turning and shoulder shrug are intact.  XII: Tongue protrudes midline  Motor: Normal bulk and tone. No pronator drift. Hard to assess strength 2/2 inattentiveness and pt has difficulty in following commands to some extent. B/l UE atleast 3/5, antigravity, able to hold it atleast for 5-8 secs, B/L LE : 4/5 with some bobbing in place.   Sensation: Intact to light touch bilaterally. hard to assess for DSST.  Coordination: Hard to assess.  Gait: Deferred.    LABS:                        10.8   7.61  )-----------( 148      ( 2023 05:30 )             32.4     04-22    137  |  105  |  10  ----------------------------<  143<H>  3.2<L>   |  22  |  0.75    Ca    7.8<L>      2023 05:30  Phos  2.7     04-21  Mg     2.0     -    TPro  6.8  /  Alb  4.1  /  TBili  0.7  /  DBili  x   /  AST  46<H>  /  ALT  20  /  AlkPhos  52  04-20    PT/INR - ( 2023 21:57 )   PT: 12.7 sec;   INR: 1.07          PTT - ( 2023 21:57 )  PTT:27.9 sec  Urinalysis Basic - ( 2023 18:00 )    Color: Yellow / Appearance: Clear / S.015 / pH: x  Gluc: x / Ketone: Negative  / Bili: Negative / Urobili: Negative   Blood: x / Protein: 30 mg/dL / Nitrite: Negative   Leuk Esterase: Negative / RBC: 10-25 /HPF / WBC 3-5 /HPF   Sq Epi: x / Non Sq Epi: x / Bacteria: Trace /HPF        RADIOLOGY & ADDITIONAL TESTS:    CT Head No Cont (23 @ 01:35)   Impression:    Stable right frontal intraparenchymal hematoma..           Neurology Stroke Progress Note    INTERVAL HPI/OVERNIGHT EVENTS:    Pt seen an examined @ bedside. [facility]  number #[id number] used. Responds appropriately to the questions asked via the . alert, awake, oriented X 2. NAD. Denies any pain/any complaints No HA/CP/Abd pain. Abd non tender. UOP +. Umm PO intake well. Appears euvolemic. pending MRI.      MEDICATIONS  (STANDING):  chlorhexidine 2% Cloths 1 Application(s) Topical <User Schedule>  donepezil 5 milliGRAM(s) Oral at bedtime  enoxaparin Injectable 40 milliGRAM(s) SubCutaneous every 24 hours  finasteride 5 milliGRAM(s) Oral daily  hydrALAZINE 25 milliGRAM(s) Oral once  hydrALAZINE 75 milliGRAM(s) Oral every 8 hours  levETIRAcetam  IVPB 500 milliGRAM(s) IV Intermittent every 12 hours  lisinopril 10 milliGRAM(s) Oral daily  magnesium oxide 400 milliGRAM(s) Oral once  metoprolol tartrate 25 milliGRAM(s) Oral two times a day  polyethylene glycol 3350 17 Gram(s) Oral daily  potassium chloride   Powder 10 milliEquivalent(s) Oral once  senna 2 Tablet(s) Oral at bedtime  tamsulosin 0.4 milliGRAM(s) Oral at bedtime    MEDICATIONS  (PRN):  acetaminophen     Tablet .. 650 milliGRAM(s) Oral every 6 hours PRN Temp greater or equal to 38C (100.4F), Mild Pain (1 - 3)      Allergies  No Known Allergies      Vital Signs Last 24 Hrs  T(C): 37.1 (04-23-23 @ 06:14), Max: 37.3 (04-22-23 @ 17:10)  T(F): 98.7 (04-23-23 @ 06:14), Max: 99.2 (04-22-23 @ 17:10)  HR: 99 (04-23-23 @ 08:26) (80 - 104)  BP: 167/74 (04-23-23 @ 08:26) (137/63 - 192/91)  BP(mean): 107 (04-23-23 @ 08:26) (90 - 131)  RR: 15 (04-23-23 @ 08:26) (15 - 26)  SpO2: 97% (04-23-23 @ 08:26) (96% - 98%)    Parameters below as of 22 Apr 2023 11:44  Patient On (Oxygen Delivery Method): room air      Physical exam:  General: pleasant elderly male, resting in bed, NAD.  Eyes: Anicteric sclerae, moist conjunctivae, see below for CNs  Neck: trachea midline, FROM.   Cardiovascular: Irregular (Afib @ times).  Pulmonary: Anterior breath sounds clear bilaterally, No use of accessory muscles.  GI: Abdomen soft, non-distended, non-tender      Neurologic:  -Mental status: Awake, alert, oriented to person(self, able to state his name and age, unable to state birth year), place (states as hospital), but not to time. Speech is fluent with intact naming, repetition, and comprehension, no dysarthria. Decreased attention span and sometimes require repetition of instructions. Follows some simple commands and mimic some.    -Cranial nerves:   II: hard to assess Visual fields as pt has difficulty following instructions, BTT Intact.  III, IV, VI: Extraocular movements are intact without nystagmus. Pupils equally round and reactive to light.  V:  Facial sensation V1-V3 equal and intact - states he feels being touched.  VII: Face is symmetric with normal eye closure and smile  VIII: Hearing is bilaterally intact to finger rub  IX, X: Uvula is midline and soft palate rises symmetrically  XI: Head turning and shoulder shrug are intact.  XII: Tongue protrudes midline  Motor: Normal bulk and tone. No pronator drift. Hard to assess strength 2/2 inattentiveness and pt has difficulty in following commands to some extent. B/l UE atleast 3/5, antigravity, able to hold for 10seconds, no drift noted.  B/L LE : 4/5 with some bobbing in place.   Sensation: Intact to light touch bilaterally. hard to assess for DSST.  Coordination: Hard to assess.  Gait: Deferred.    LABS:                                   11.9   7.51  )-----------( 157      ( 23 Apr 2023 05:30 )             35.4     04-23    138  |  103  |  10  ----------------------------<  124<H>  3.9   |  25  |  0.73    Ca    8.2<L>      23 Apr 2023 05:30  Phos  2.5     04-23  Mg     1.9     04-23      RADIOLOGY & ADDITIONAL TESTS:    CT Head No Cont (04.21.23 @ 01:35)   Impression:    Stable right frontal intraparenchymal hematoma..           Neurology Stroke Progress Note    INTERVAL HPI/OVERNIGHT EVENTS:    Pt seen an examined @ bedside. [facility]  number #[id number] used. Responds appropriately to the questions asked via the . alert, awake, oriented X 2. NAD. Denies any pain/any complaints No HA/CP/Abd pain. Abd non tender. UOP +. Umm PO intake well. Appears euvolemic. pending MRI. 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      MEDICATIONS  (STANDING):  chlorhexidine 2% Cloths 1 Application(s) Topical <User Schedule>  donepezil 5 milliGRAM(s) Oral at bedtime  enoxaparin Injectable 40 milliGRAM(s) SubCutaneous every 24 hours  finasteride 5 milliGRAM(s) Oral daily  hydrALAZINE 25 milliGRAM(s) Oral once  hydrALAZINE 75 milliGRAM(s) Oral every 8 hours  levETIRAcetam  IVPB 500 milliGRAM(s) IV Intermittent every 12 hours  lisinopril 10 milliGRAM(s) Oral daily  magnesium oxide 400 milliGRAM(s) Oral once  metoprolol tartrate 25 milliGRAM(s) Oral two times a day  polyethylene glycol 3350 17 Gram(s) Oral daily  potassium chloride   Powder 10 milliEquivalent(s) Oral once  senna 2 Tablet(s) Oral at bedtime  tamsulosin 0.4 milliGRAM(s) Oral at bedtime    MEDICATIONS  (PRN):  acetaminophen     Tablet .. 650 milliGRAM(s) Oral every 6 hours PRN Temp greater or equal to 38C (100.4F), Mild Pain (1 - 3)      Allergies  No Known Allergies      Vital Signs Last 24 Hrs  T(C): 37.1 (04-23-23 @ 06:14), Max: 37.3 (04-22-23 @ 17:10)  T(F): 98.7 (04-23-23 @ 06:14), Max: 99.2 (04-22-23 @ 17:10)  HR: 99 (04-23-23 @ 08:26) (80 - 104)  BP: 167/74 (04-23-23 @ 08:26) (137/63 - 192/91)  BP(mean): 107 (04-23-23 @ 08:26) (90 - 131)  RR: 15 (04-23-23 @ 08:26) (15 - 26)  SpO2: 97% (04-23-23 @ 08:26) (96% - 98%)    Parameters below as of 22 Apr 2023 11:44  Patient On (Oxygen Delivery Method): room air      Physical exam:  General: pleasant elderly male, resting in bed, NAD.  Eyes: Anicteric sclerae, moist conjunctivae, see below for CNs  Neck: trachea midline, FROM.   Cardiovascular: Irregular (Afib @ times).  Pulmonary: Anterior breath sounds clear bilaterally, No use of accessory muscles.  GI: Abdomen soft, non-distended, non-tender      Neurologic:  -Mental status: Awake, alert, oriented to person(self, able to state his name and age, unable to state birth year), place (states as hospital), but not to time. Speech is fluent with intact naming, repetition, and comprehension, no dysarthria. Decreased attention span and sometimes require repetition of instructions. Follows some simple commands and mimic some.    -Cranial nerves:   II: hard to assess Visual fields as pt has difficulty following instructions, BTT Intact.  III, IV, VI: Extraocular movements are intact without nystagmus. Pupils equally round and reactive to light.  V:  Facial sensation V1-V3 equal and intact - states he feels being touched.  VII: Face is symmetric with normal eye closure and smile  VIII: Hearing is bilaterally intact to finger rub  IX, X: Uvula is midline and soft palate rises symmetrically  XI: Head turning and shoulder shrug are intact.  XII: Tongue protrudes midline  Motor: Normal bulk and tone. No pronator drift. Hard to assess strength 2/2 inattentiveness and pt has difficulty in following commands to some extent. B/l UE atleast 3/5, antigravity, able to hold for 10seconds, no drift noted.  B/L LE : 4/5 with some bobbing in place.   Sensation: Intact to light touch bilaterally. hard to assess for DSST.  Coordination: Hard to assess.  Gait: Deferred.    LABS:                                   11.9   7.51  )-----------( 157      ( 23 Apr 2023 05:30 )             35.4     04-23    138  |  103  |  10  ----------------------------<  124<H>  3.9   |  25  |  0.73    Ca    8.2<L>      23 Apr 2023 05:30  Phos  2.5     04-23  Mg     1.9     04-23      RADIOLOGY & ADDITIONAL TESTS:    CT Head No Cont (04.21.23 @ 01:35)   Impression:    Stable right frontal intraparenchymal hematoma..           Neurology Stroke Progress Note    INTERVAL HPI/OVERNIGHT EVENTS:    Pt seen an examined @ bedside. [facility]  number #[id number] used. Responds appropriately sometimes to the questions asked via the .  Waxing and waning mental status. alert, awake, oriented X 2. NAD. Denies any pain/any complaints No HA/CP/Abd pain. Abd non tender. UOP +. Umm PO intake well. Appears euvolemic. Pending MRI. 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    Per D/W Daughters @ bedside, pt has been having ? cognitive decline since prior stroke which affected his balance, was given cane which pt refuses to use. Also has been having intentional tremors and was told by the neurologist ? may not be parkinson's Also endorses falls prior to hospitalization without head strike and pt exhibiting abnormal behaviour like rigid or clenching UE w/ rigid facial muscles, legs extended and stiff, and some starring episodes, ? foam in the mouth which lasted for abt 1min. Pt has been stressed lately abt his wife's health who has parkinson's disease and was noted to be tearful/sad. Family denies any Hx of Sz. 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    Though pt denies any pain when asked even w/ , was C/O back pain w/movement to the family, given the language barrier, will change tylenol to RTC and add lidocaine patch to back given his age indeterminate T5 and L5 Fx seen in CT as part of trauma W/U.      MEDICATIONS  (STANDING):  chlorhexidine 2% Cloths 1 Application(s) Topical <User Schedule>  donepezil 5 milliGRAM(s) Oral at bedtime  enoxaparin Injectable 40 milliGRAM(s) SubCutaneous every 24 hours  finasteride 5 milliGRAM(s) Oral daily  hydrALAZINE 25 milliGRAM(s) Oral once  hydrALAZINE 75 milliGRAM(s) Oral every 8 hours  levETIRAcetam  IVPB 500 milliGRAM(s) IV Intermittent every 12 hours  lisinopril 10 milliGRAM(s) Oral daily  magnesium oxide 400 milliGRAM(s) Oral once  metoprolol tartrate 25 milliGRAM(s) Oral two times a day  polyethylene glycol 3350 17 Gram(s) Oral daily  potassium chloride   Powder 10 milliEquivalent(s) Oral once  senna 2 Tablet(s) Oral at bedtime  tamsulosin 0.4 milliGRAM(s) Oral at bedtime    MEDICATIONS  (PRN):  acetaminophen     Tablet .. 650 milliGRAM(s) Oral every 6 hours PRN Temp greater or equal to 38C (100.4F), Mild Pain (1 - 3)      Allergies  No Known Allergies      Vital Signs Last 24 Hrs  T(C): 37.1 (04-23-23 @ 06:14), Max: 37.3 (04-22-23 @ 17:10)  T(F): 98.7 (04-23-23 @ 06:14), Max: 99.2 (04-22-23 @ 17:10)  HR: 99 (04-23-23 @ 08:26) (80 - 104)  BP: 167/74 (04-23-23 @ 08:26) (137/63 - 192/91)  BP(mean): 107 (04-23-23 @ 08:26) (90 - 131)  RR: 15 (04-23-23 @ 08:26) (15 - 26)  SpO2: 97% (04-23-23 @ 08:26) (96% - 98%)    Parameters below as of 22 Apr 2023 11:44  Patient On (Oxygen Delivery Method): room air      Physical exam:  General: pleasant elderly male, resting in bed, NAD.  Eyes: Anicteric sclerae, moist conjunctivae, see below for CNs  Neck: trachea midline, FROM.   Cardiovascular: Irregular (Afib @ times).  Pulmonary: Anterior breath sounds clear bilaterally, No use of accessory muscles.  GI: Abdomen soft, non-distended, non-tender      Neurologic:  -Mental status: Awake, alert, oriented to person(self, able to state his name and age, unable to state birth year), place (states as hospital), but not to time. Speech is fluent with intact naming, repetition, and comprehension, no dysarthria. Decreased attention span and sometimes require repetition of instructions. Follows some simple commands and mimic some.    -Cranial nerves:   II: hard to assess Visual fields as pt has difficulty following instructions, BTT Intact.  III, IV, VI: Extraocular movements are intact without nystagmus. Pupils equally round and reactive to light.  V:  Facial sensation V1-V3 equal and intact - states he feels being touched.  VII: Face is symmetric with normal eye closure and smile  VIII: Hearing is bilaterally intact to finger rub  IX, X: Uvula is midline and soft palate rises symmetrically  XI: Head turning and shoulder shrug are intact.  XII: Tongue protrudes midline  Motor: Normal bulk and tone. No pronator drift. Hard to assess strength 2/2 inattentiveness and pt has difficulty in following commands to some extent. B/l UE atleast 3/5, antigravity, able to hold for 10seconds, no drift noted.  B/L LE : 4/5 with some bobbing in place(baseline per family).  Sensation: Intact to light touch bilaterally. hard to assess for DSST.  Coordination: Hard to assess given pts intentional tremors.  Gait: Deferred.    LABS:                                   11.9   7.51  )-----------( 157      ( 23 Apr 2023 05:30 )             35.4     04-23    138  |  103  |  10  ----------------------------<  124<H>  3.9   |  25  |  0.73    Ca    8.2<L>      23 Apr 2023 05:30  Phos  2.5     04-23  Mg     1.9     04-23      RADIOLOGY & ADDITIONAL TESTS:    CT Head No Cont (04.21.23 @ 01:35)   Impression:    Stable right frontal intraparenchymal hematoma..           Neurology Stroke Progress Note    INTERVAL HPI/OVERNIGHT EVENTS:    Pt seen an examined @ bedside. [facility]  number #[id number] used. Responds appropriately sometimes to the questions asked via the .  Waxing and waning mental status. alert, awake, oriented X 2. NAD. Denies any pain/any complaints No HA/CP/Abd pain. Abd non tender. UOP +. Umm PO intake well. Appears euvolemic. Pending MRI. 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    Per D/W Daughters @ bedside, pt has been having ? cognitive decline since prior stroke which affected his balance, was given cane which pt refuses to use. Also has been having intentional tremors and was told by the neurologist ? may not be parkinson's Also endorses falls prior to hospitalization without head strike and pt exhibiting abnormal behaviour like rigid or clenching UE w/ rigid facial muscles, legs extended and stiff, and some starring episodes, ? foam in the mouth which lasted for abt 1min. Pt has been stressed lately abt his wife's health who has parkinson's disease and was noted to be tearful/sad. Family denies any Hx of Sz. 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    Though pt denies any pain when asked even w/ , was C/O back pain w/movement to the family, given the language barrier, will change tylenol to RTC and add lidocaine patch to back given his age indeterminate T5 and L5 Fx seen in CT as part of trauma W/U.    Reg BP : AM BP >160, Hydral 25mg PO X 1 ordered and will increase hydral to 75mg po Q8H.      MEDICATIONS  (STANDING):  chlorhexidine 2% Cloths 1 Application(s) Topical <User Schedule>  donepezil 5 milliGRAM(s) Oral at bedtime  enoxaparin Injectable 40 milliGRAM(s) SubCutaneous every 24 hours  finasteride 5 milliGRAM(s) Oral daily  hydrALAZINE 25 milliGRAM(s) Oral once  hydrALAZINE 75 milliGRAM(s) Oral every 8 hours  levETIRAcetam  IVPB 500 milliGRAM(s) IV Intermittent every 12 hours  lisinopril 10 milliGRAM(s) Oral daily  magnesium oxide 400 milliGRAM(s) Oral once  metoprolol tartrate 25 milliGRAM(s) Oral two times a day  polyethylene glycol 3350 17 Gram(s) Oral daily  potassium chloride   Powder 10 milliEquivalent(s) Oral once  senna 2 Tablet(s) Oral at bedtime  tamsulosin 0.4 milliGRAM(s) Oral at bedtime    MEDICATIONS  (PRN):  acetaminophen     Tablet .. 650 milliGRAM(s) Oral every 6 hours PRN Temp greater or equal to 38C (100.4F), Mild Pain (1 - 3)      Allergies  No Known Allergies      Vital Signs Last 24 Hrs  T(C): 37.1 (04-23-23 @ 06:14), Max: 37.3 (04-22-23 @ 17:10)  T(F): 98.7 (04-23-23 @ 06:14), Max: 99.2 (04-22-23 @ 17:10)  HR: 99 (04-23-23 @ 08:26) (80 - 104)  BP: 167/74 (04-23-23 @ 08:26) (137/63 - 192/91)  BP(mean): 107 (04-23-23 @ 08:26) (90 - 131)  RR: 15 (04-23-23 @ 08:26) (15 - 26)  SpO2: 97% (04-23-23 @ 08:26) (96% - 98%)    Parameters below as of 22 Apr 2023 11:44  Patient On (Oxygen Delivery Method): room air      Physical exam:  General: pleasant elderly male, resting in bed, NAD.  Eyes: Anicteric sclerae, moist conjunctivae, see below for CNs  Neck: trachea midline, FROM.   Cardiovascular: Irregular (Afib @ times).  Pulmonary: Anterior breath sounds clear bilaterally, No use of accessory muscles.  GI: Abdomen soft, non-distended, non-tender      Neurologic:  -Mental status: Awake, alert, oriented to person(self, able to state his name and age, unable to state birth year), place (states as hospital), but not to time. Speech is fluent with intact naming, repetition, and comprehension, no dysarthria. Decreased attention span and sometimes require repetition of instructions. Follows some simple commands and mimic some.    -Cranial nerves:   II: hard to assess Visual fields as pt has difficulty following instructions, BTT Intact.  III, IV, VI: Extraocular movements are intact without nystagmus. Pupils equally round and reactive to light.  V:  Facial sensation V1-V3 equal and intact - states he feels being touched.  VII: Face is symmetric with normal eye closure and smile  VIII: Hearing is bilaterally intact to finger rub  IX, X: Uvula is midline and soft palate rises symmetrically  XI: Head turning and shoulder shrug are intact.  XII: Tongue protrudes midline  Motor: Normal bulk and tone. No pronator drift. Hard to assess strength 2/2 inattentiveness and pt has difficulty in following commands to some extent. B/l UE atleast 3/5, antigravity, able to hold for 10seconds, no drift noted.  B/L LE : 4/5 with some bobbing in place(baseline per family).  Sensation: Intact to light touch bilaterally. hard to assess for DSST.  Coordination: Hard to assess given pts intentional tremors.  Gait: Deferred.    LABS:                                   11.9   7.51  )-----------( 157      ( 23 Apr 2023 05:30 )             35.4     04-23    138  |  103  |  10  ----------------------------<  124<H>  3.9   |  25  |  0.73    Ca    8.2<L>      23 Apr 2023 05:30  Phos  2.5     04-23  Mg     1.9     04-23      RADIOLOGY & ADDITIONAL TESTS:    CT Head No Cont (04.21.23 @ 01:35)   Impression:    Stable right frontal intraparenchymal hematoma..           Neurology Stroke Progress Note    INTERVAL HPI/OVERNIGHT EVENTS:    Pt seen an examined @ bedside. [facility]  number #[id number] used. Responds appropriately sometimes to the questions asked via the .  Waxing and waning mental status. alert, awake, oriented X 2. NAD. Denies any pain/any complaints No HA/CP/Abd pain. Abd non tender. UOP +. Umm PO intake well. Appears euvolemic. Pending MRI. 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    Per D/W Daughters @ bedside, pt has been having ? cognitive decline since prior stroke which affected his balance, was given cane which pt refuses to use. Also has been having intentional tremors and was told by the neurologist ? may not be parkinson's Also endorses falls prior to hospitalization without head strike and pt exhibiting abnormal behaviour like rigid or clenching UE w/ rigid facial muscles, legs extended and stiff, and some starring episodes, ? foam in the mouth which lasted for abt 1min. Pt has been stressed lately abt his wife's health who has parkinson's disease and was noted to be tearful/sad. Family denies any Hx of Sz. 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    Though pt denies any pain when asked even w/ , was C/O back pain w/movement to the family, given the language barrier, will change tylenol to RTC and add lidocaine patch to back given his age indeterminate T5 and L5 Fx seen in CT as part of trauma W/U.    Reg BP : AM BP >160, Hydral 25mg PO X 1 ordered and will increase hydral to 75mg po Q8H.      MEDICATIONS  (STANDING):  chlorhexidine 2% Cloths 1 Application(s) Topical <User Schedule>  donepezil 5 milliGRAM(s) Oral at bedtime  enoxaparin Injectable 40 milliGRAM(s) SubCutaneous every 24 hours  finasteride 5 milliGRAM(s) Oral daily  hydrALAZINE 25 milliGRAM(s) Oral once  hydrALAZINE 75 milliGRAM(s) Oral every 8 hours  levETIRAcetam  IVPB 500 milliGRAM(s) IV Intermittent every 12 hours  lisinopril 10 milliGRAM(s) Oral daily  magnesium oxide 400 milliGRAM(s) Oral once  metoprolol tartrate 25 milliGRAM(s) Oral two times a day  polyethylene glycol 3350 17 Gram(s) Oral daily  potassium chloride   Powder 10 milliEquivalent(s) Oral once  senna 2 Tablet(s) Oral at bedtime  tamsulosin 0.4 milliGRAM(s) Oral at bedtime    MEDICATIONS  (PRN):  acetaminophen     Tablet .. 650 milliGRAM(s) Oral every 6 hours PRN Temp greater or equal to 38C (100.4F), Mild Pain (1 - 3)      Allergies  No Known Allergies      Vital Signs Last 24 Hrs  T(C): 37.1 (04-23-23 @ 06:14), Max: 37.3 (04-22-23 @ 17:10)  T(F): 98.7 (04-23-23 @ 06:14), Max: 99.2 (04-22-23 @ 17:10)  HR: 99 (04-23-23 @ 08:26) (80 - 104)  BP: 167/74 (04-23-23 @ 08:26) (137/63 - 192/91)  BP(mean): 107 (04-23-23 @ 08:26) (90 - 131)  RR: 15 (04-23-23 @ 08:26) (15 - 26)  SpO2: 97% (04-23-23 @ 08:26) (96% - 98%)    Parameters below as of 22 Apr 2023 11:44  Patient On (Oxygen Delivery Method): room air      Physical exam:  General: pleasant elderly male, resting in bed, NAD.  Eyes: Anicteric sclerae, moist conjunctivae, see below for CNs  Neck: trachea midline, FROM.   Cardiovascular: Irregular (Afib @ times).  Pulmonary: Anterior breath sounds clear bilaterally, No use of accessory muscles.  GI: Abdomen soft, non-distended, non-tender      Neurologic:  -Mental status: Awake, alert, oriented to person(self, able to state his name and age, unable to state birth year), place (states as hospital), but not to time. Waxing and waning mental status. Speech is fluent with intact naming, repetition, and comprehension, no dysarthria. Decreased attention span and sometimes require repetition of instructions. Follows some simple commands and mimic some.    -Cranial nerves:   II: hard to assess Visual fields as pt has difficulty following instructions, BTT Intact.  III, IV, VI: Extraocular movements are intact without nystagmus. Pupils equally round and reactive to light.  V:  Facial sensation V1-V3 equal and intact - states he feels being touched.  VII: Face is symmetric with normal eye closure and smile  VIII: Hearing is bilaterally intact to finger rub  IX, X: Uvula is midline and soft palate rises symmetrically  XI: Head turning and shoulder shrug are intact.  XII: Tongue protrudes midline  Motor: Normal bulk and tone. No pronator drift. Hard to assess strength 2/2 inattentiveness and pt has difficulty in following commands to some extent. B/l UE atleast 3/5, antigravity, able to hold for 10seconds, no drift noted.  B/L LE : 4/5 with some bobbing in place(baseline LE weakness per family).  Sensation: Intact to light touch bilaterally. hard to assess for DSST.  Coordination: Hard to assess given pts intentional tremors.  Gait: Deferred.    LABS:                                   11.9   7.51  )-----------( 157      ( 23 Apr 2023 05:30 )             35.4     04-23    138  |  103  |  10  ----------------------------<  124<H>  3.9   |  25  |  0.73    Ca    8.2<L>      23 Apr 2023 05:30  Phos  2.5     04-23  Mg     1.9     04-23      RADIOLOGY & ADDITIONAL TESTS:    CT Head No Cont (04.21.23 @ 01:35)   Impression:    Stable right frontal intraparenchymal hematoma..           Neurology Stroke Progress Note    INTERVAL HPI/OVERNIGHT EVENTS:    Pt seen an examined @ bedside. Liam  number #[id number] used. Responds appropriately sometimes to the questions asked via the .  Waxing and waning mental status. alert, awake, oriented X 2. NAD. Denies any pain/any complaints No HA/CP/Abd pain. Abd non tender. UOP +. Umm PO intake well. Appears euvolemic. Pending MRI. 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    Per D/W Daughters @ bedside, pt has been having ? cognitive decline since prior stroke which affected his balance, was given cane which pt refuses to use. Also has been having intentional tremors and was told by the neurologist ? may not be parkinson's Also endorses falls prior to hospitalization without head strike and pt exhibiting abnormal behaviour like rigid or clenching UE w/ rigid facial muscles, legs extended and stiff, and some starring episodes, ? foam in the mouth which lasted for abt 1min. Pt has been stressed lately abt his wife's health who has parkinson's disease and was noted to be tearful/sad. Family denies any Hx of Sz. 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 Per family, pt always knew how to check BP, but past 3-4 weeks, pt was unable to check his BP(forgot how to use BP machine).    Though pt denies any pain when asked even w/ , was C/O back pain w/movement to the family, given the language barrier, will change tylenol to RTC and add lidocaine patch to back given his age indeterminate T5 and L5 Fx seen in CT as part of trauma W/U.    Reg BP : AM BP >160, Hydral 25mg PO X 1 ordered and will increase hydral to 75mg po Q8H.      MEDICATIONS  (STANDING):  chlorhexidine 2% Cloths 1 Application(s) Topical <User Schedule>  donepezil 5 milliGRAM(s) Oral at bedtime  enoxaparin Injectable 40 milliGRAM(s) SubCutaneous every 24 hours  finasteride 5 milliGRAM(s) Oral daily  hydrALAZINE 25 milliGRAM(s) Oral once  hydrALAZINE 75 milliGRAM(s) Oral every 8 hours  levETIRAcetam  IVPB 500 milliGRAM(s) IV Intermittent every 12 hours  lisinopril 10 milliGRAM(s) Oral daily  magnesium oxide 400 milliGRAM(s) Oral once  metoprolol tartrate 25 milliGRAM(s) Oral two times a day  polyethylene glycol 3350 17 Gram(s) Oral daily  potassium chloride   Powder 10 milliEquivalent(s) Oral once  senna 2 Tablet(s) Oral at bedtime  tamsulosin 0.4 milliGRAM(s) Oral at bedtime    MEDICATIONS  (PRN):  acetaminophen     Tablet .. 650 milliGRAM(s) Oral every 6 hours PRN Temp greater or equal to 38C (100.4F), Mild Pain (1 - 3)      Allergies  No Known Allergies      Vital Signs Last 24 Hrs  T(C): 37.1 (04-23-23 @ 06:14), Max: 37.3 (04-22-23 @ 17:10)  T(F): 98.7 (04-23-23 @ 06:14), Max: 99.2 (04-22-23 @ 17:10)  HR: 99 (04-23-23 @ 08:26) (80 - 104)  BP: 167/74 (04-23-23 @ 08:26) (137/63 - 192/91)  BP(mean): 107 (04-23-23 @ 08:26) (90 - 131)  RR: 15 (04-23-23 @ 08:26) (15 - 26)  SpO2: 97% (04-23-23 @ 08:26) (96% - 98%)    Parameters below as of 22 Apr 2023 11:44  Patient On (Oxygen Delivery Method): room air      Physical exam:  General: pleasant elderly male, resting in bed, NAD.  Eyes: Anicteric sclerae, moist conjunctivae, see below for CNs  Neck: trachea midline, FROM.   Cardiovascular: Irregular (Afib @ times).  Pulmonary: Anterior breath sounds clear bilaterally, No use of accessory muscles.  GI: Abdomen soft, non-distended, non-tender      Neurologic:  -Mental status: Awake, alert, oriented to person(self, able to state his name and age, unable to state birth year), place (states as hospital), but not to time. Waxing and waning mental status. Speech is fluent with intact naming, repetition, and comprehension, no dysarthria. Decreased attention span and sometimes require repetition of instructions. Follows some simple commands and mimic some.    -Cranial nerves:   II: hard to assess Visual fields as pt has difficulty following instructions, BTT Intact.  III, IV, VI: Extraocular movements are intact without nystagmus. Pupils equally round and reactive to light.  V:  Facial sensation V1-V3 equal and intact - states he feels being touched.  VII: Face is symmetric with normal eye closure and smile  VIII: Hearing is bilaterally intact to finger rub  IX, X: Uvula is midline and soft palate rises symmetrically  XI: Head turning and shoulder shrug are intact.  XII: Tongue protrudes midline  Motor: Normal bulk and tone. No pronator drift. Hard to assess strength 2/2 inattentiveness and pt has difficulty in following commands to some extent. B/l UE atleast 3/5, antigravity, able to hold for 10seconds, no drift noted.  B/L LE : 4/5 with some bobbing in place(baseline LE weakness per family).  Sensation: Intact to light touch bilaterally. hard to assess for DSST.  Coordination: Hard to assess given pts intentional tremors.  Gait: Deferred.    LABS:                                   11.9   7.51  )-----------( 157      ( 23 Apr 2023 05:30 )             35.4     04-23    138  |  103  |  10  ----------------------------<  124<H>  3.9   |  25  |  0.73    Ca    8.2<L>      23 Apr 2023 05:30  Phos  2.5     04-23  Mg     1.9     04-23      RADIOLOGY & ADDITIONAL TESTS:    CT Head No Cont (04.21.23 @ 01:35)   Impression:    Stable right frontal intraparenchymal hematoma..           Neurology Stroke Progress Note    INTERVAL HPI/OVERNIGHT EVENTS:    Pt seen an examined @ bedside. Liam  number #[id number] used. Responds appropriately sometimes to the questions asked via the .  Waxing and waning mental status. alert, awake, oriented X 2. NAD. Denies any pain/any complaints No HA/CP/Abd pain. Abd non tender. UOP +. Umm PO intake well. Appears euvolemic. Pending MRI. 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    Per D/W Daughters @ bedside, pt has been having ? cognitive decline since prior stroke which affected his balance, was given cane which pt refuses to use. Also has been having intentional tremors and was told by the neurologist ? may not be parkinson's Also endorses falls prior to hospitalization without head strike and pt exhibiting abnormal behaviour like rigid or clenching UE w/ rigid facial muscles, legs extended and stiff, and some starring episodes, ? foam in the mouth which lasted for abt 1min. Pt has been stressed lately abt his wife's health who has parkinson's disease and was noted to be tearful/sad. Family denies any Hx of Sz. 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 Per family, pt always knew how to check BP, but past 3-4 weeks, pt was unable to check his BP(forgot how to use BP machine) even with written instructions, but states has been taking his meds regularly??.    Though pt denies any pain when asked even w/ , was C/O back pain w/movement to the family, given the language barrier, will change tylenol to RTC and add lidocaine patch to back given his age indeterminate T5 and L5 Fx seen in CT as part of trauma W/U.    Reg BP : AM BP >160, Hydral 25mg PO X 1 ordered and will increase hydral to 75mg po Q8H.      MEDICATIONS  (STANDING):  chlorhexidine 2% Cloths 1 Application(s) Topical <User Schedule>  donepezil 5 milliGRAM(s) Oral at bedtime  enoxaparin Injectable 40 milliGRAM(s) SubCutaneous every 24 hours  finasteride 5 milliGRAM(s) Oral daily  hydrALAZINE 25 milliGRAM(s) Oral once  hydrALAZINE 75 milliGRAM(s) Oral every 8 hours  levETIRAcetam  IVPB 500 milliGRAM(s) IV Intermittent every 12 hours  lisinopril 10 milliGRAM(s) Oral daily  magnesium oxide 400 milliGRAM(s) Oral once  metoprolol tartrate 25 milliGRAM(s) Oral two times a day  polyethylene glycol 3350 17 Gram(s) Oral daily  potassium chloride   Powder 10 milliEquivalent(s) Oral once  senna 2 Tablet(s) Oral at bedtime  tamsulosin 0.4 milliGRAM(s) Oral at bedtime    MEDICATIONS  (PRN):  acetaminophen     Tablet .. 650 milliGRAM(s) Oral every 6 hours PRN Temp greater or equal to 38C (100.4F), Mild Pain (1 - 3)      Allergies  No Known Allergies      Vital Signs Last 24 Hrs  T(C): 37.1 (04-23-23 @ 06:14), Max: 37.3 (04-22-23 @ 17:10)  T(F): 98.7 (04-23-23 @ 06:14), Max: 99.2 (04-22-23 @ 17:10)  HR: 99 (04-23-23 @ 08:26) (80 - 104)  BP: 167/74 (04-23-23 @ 08:26) (137/63 - 192/91)  BP(mean): 107 (04-23-23 @ 08:26) (90 - 131)  RR: 15 (04-23-23 @ 08:26) (15 - 26)  SpO2: 97% (04-23-23 @ 08:26) (96% - 98%)    Parameters below as of 22 Apr 2023 11:44  Patient On (Oxygen Delivery Method): room air      Physical exam:  General: pleasant elderly male, resting in bed, NAD.  Eyes: Anicteric sclerae, moist conjunctivae, see below for CNs  Neck: trachea midline, FROM.   Cardiovascular: Irregular (Afib @ times).  Pulmonary: Anterior breath sounds clear bilaterally, No use of accessory muscles.  GI: Abdomen soft, non-distended, non-tender      Neurologic:  -Mental status: Awake, alert, oriented to person(self, able to state his name and age, unable to state birth year), place (states as hospital), but not to time. Waxing and waning mental status. Speech is fluent with intact naming, repetition, and comprehension, no dysarthria. Decreased attention span and sometimes require repetition of instructions. Follows some simple commands and mimic some.    -Cranial nerves:   II: hard to assess Visual fields as pt has difficulty following instructions, BTT Intact.  III, IV, VI: Extraocular movements are intact without nystagmus. Pupils equally round and reactive to light.  V:  Facial sensation V1-V3 equal and intact - states he feels being touched.  VII: Face is symmetric with normal eye closure and smile  VIII: Hearing is bilaterally intact to finger rub  IX, X: Uvula is midline and soft palate rises symmetrically  XI: Head turning and shoulder shrug are intact.  XII: Tongue protrudes midline  Motor: Normal bulk and tone. No pronator drift. Hard to assess strength 2/2 inattentiveness and pt has difficulty in following commands to some extent. B/l UE atleast 3/5, antigravity, able to hold for 10seconds, no drift noted.  B/L LE : 4/5 with some bobbing in place(baseline LE weakness per family).  Sensation: Intact to light touch bilaterally. hard to assess for DSST.  Coordination: Hard to assess given pts intentional tremors.  Gait: Deferred.    LABS:                                   11.9   7.51  )-----------( 157      ( 23 Apr 2023 05:30 )             35.4     04-23    138  |  103  |  10  ----------------------------<  124<H>  3.9   |  25  |  0.73    Ca    8.2<L>      23 Apr 2023 05:30  Phos  2.5     04-23  Mg     1.9     04-23      RADIOLOGY & ADDITIONAL TESTS:    CT Head No Cont (04.21.23 @ 01:35)   Impression:    Stable right frontal intraparenchymal hematoma..

## 2023-04-23 NOTE — PROGRESS NOTE ADULT - ASSESSMENT
87yM Catonese speaking male pmhx of dementia (oriented x 2 at baseline, typically ambulates with cane or walker), HTN, prior CVA, and BPH presents to [facility] ED with altered mental status in the setting of 2 falls and urinary incontinence. He was found to have a right frontal IPH and concern for seizures, he was admitted to NSICU for cardene gtt and q1h neurochecks; he was later then transferred to [facility] NSICU then stepped down to stroke mgmt. Medicine comgmt consulted for remainder of care.    Recommendation:  #Neuro- Defer care to primary team for right frontal IPH and c/f provoked seizures, begin atorvastatin 80mg qd, Dementia: OOBTC, light exposure  B/L mittens d/t line pulling: deescalate if patient no longer pulling lines   #Cardiovascular- long standing persistent afib: c/w rate control w/lopressor 25mg BID, defer AC given new IPH, HTN: BP range per primary team, if above goal can increased hydral to 50mg TID w/hold parameters  Myocardial injury: likely demand ischemia given downtrending CE, deferring ASA/plavix/heparin d/t IPH, TTE unremarkable for WMA  #Pulmonary- ALEJO  #GI- c/w bowel regimen  #Renal- Rhabdo: mildly elevated CK, UA + blood, no evidence of LUC, trend renal performance  #Endocrine- ALEJO  #Heme/Onc-Normocytic Anemia: no evidence of acute bleed likely anemia of chronic inflammation, please obtain iron panel, transfuse if hgb<7, maintain active T/S  #ID-ALEJO  #MSK-ALEJO    Dispo: AIR    Plan discussed with primary team 87yM Catonese speaking male pmhx of dementia (oriented x 2 at baseline, typically ambulates with cane or walker), HTN, prior CVA, and BPH presents to [facility] ED with altered mental status in the setting of 2 falls and urinary incontinence. He was found to have a right frontal IPH and concern for seizures, he was admitted to NSICU for cardene gtt and q1h neurochecks; he was later then transferred to [facility] NSICU then stepped down to stroke mgmt. Medicine comgmt consulted for remainder of care.    Recommendation:  #Neuro- Defer care to primary team for right frontal IPH and c/f provoked seizures, c/w atorvastatin 80mg qd, Dementia: OOBTC, light exposure  B/L mittens d/t line pulling: deescalate if patient no longer pulling lines   #Cardiovascular- long standing persistent afib: c/w rate control w/lopressor 25mg BID, defer AC given acute IPH, HTN: BP range per primary team, if above goal can increased hydral to 50mg TID w/hold parameters  #Pulmonary- ALEJO  #GI- c/w bowel regimen  #Renal- Rhabdo: mildly elevated downtrending CK, UA + blood, no evidence of LUC, trend renal performance  #Endocrine- ALEJO  #Heme/Onc-Normocytic Anemia: no evidence of acute bleed likely anemia of chronic inflammation, please obtain iron panel, transfuse if hgb<7, maintain active T/S  #ID-ALEJO  #MSK-ALEJO    Dispo: AIR    Plan discussed with primary team

## 2023-04-23 NOTE — PROGRESS NOTE ADULT - SUBJECTIVE AND OBJECTIVE BOX
**Incomplete Note**    INTERVAL HPI/OVERNIGHT EVENTS:  Patient was seen and examined at bedside. As per nurse and patient, no o/n events, patient resting comfortably. No complaints at this time. Patient denies: fever, chills, dizziness, weakness, HA, Changes in vision, CP, palpitations, SOB, cough, N/V/D/C, dysuria, changes in bowel movements, LE edema. ROS otherwise negative.    VITAL SIGNS:  T(F): 98.7 (04-23-23 @ 06:14)  HR: 80 (04-23-23 @ 04:53)  BP: 160/76 (04-23-23 @ 04:53)  RR: 18 (04-23-23 @ 04:53)  SpO2: 98% (04-23-23 @ 04:53)  Wt(kg): --      04-22-23 @ 07:01  -  04-23-23 @ 07:00  --------------------------------------------------------  IN: 223 mL / OUT: 1675 mL / NET: -1452 mL    04-23-23 @ 07:01  -  04-23-23 @ 08:54  --------------------------------------------------------  IN: 218 mL / OUT: 250 mL / NET: -32 mL        PHYSICAL EXAM:    Constitutional: WDWN resting comfortably in bed; NAD  HEENT: NC/AT, PERRL, EOMI, anicteric sclera, no nasal discharge; uvula midline, no oropharyngeal erythema or exudates; MMM  Neck: supple; no JVD or thyromegaly  Respiratory: CTA B/L; no W/R/R, no retractions  Cardiac: +S1/S2; RRR; no M/R/G; PMI non-displaced  Gastrointestinal: soft, NT/ND; no rebound or guarding; +BSx4  Genitourinary: normal external genitalia  Back: spine midline, no bony tenderness or step-offs; no CVAT B/L  Extremities: WWP, no clubbing or cyanosis; no peripheral edema  Musculoskeletal: NROM x4; no joint swelling, tenderness or erythema  Vascular: 2+ radial, DP/PT pulses B/L  Dermatologic: skin warm, dry and intact; no rashes, wounds, or scars  Lymphatic: no submandibular or cervical LAD  Neurologic: AAOx3; CNII-XII grossly intact; no focal deficits  Psychiatric: affect and characteristics of appearance, verbalizations, behaviors are appropriate, denies SI/HI/AH/VH    MEDICATIONS  (STANDING):  chlorhexidine 2% Cloths 1 Application(s) Topical <User Schedule>  donepezil 5 milliGRAM(s) Oral at bedtime  enoxaparin Injectable 40 milliGRAM(s) SubCutaneous every 24 hours  finasteride 5 milliGRAM(s) Oral daily  hydrALAZINE 50 milliGRAM(s) Oral every 8 hours  levETIRAcetam  IVPB 500 milliGRAM(s) IV Intermittent every 12 hours  lisinopril 10 milliGRAM(s) Oral daily  magnesium oxide 400 milliGRAM(s) Oral once  metoprolol tartrate 25 milliGRAM(s) Oral two times a day  polyethylene glycol 3350 17 Gram(s) Oral daily  potassium chloride   Solution 10 milliEquivalent(s) Oral once  senna 2 Tablet(s) Oral at bedtime  tamsulosin 0.4 milliGRAM(s) Oral at bedtime    MEDICATIONS  (PRN):  acetaminophen     Tablet .. 650 milliGRAM(s) Oral every 6 hours PRN Temp greater or equal to 38C (100.4F), Mild Pain (1 - 3)      Allergies    No Known Allergies    Intolerances        LABS:                        11.9   7.51  )-----------( 157      ( 23 Apr 2023 05:30 )             35.4     04-23    138  |  103  |  10  ----------------------------<  124<H>  3.9   |  25  |  0.73    Ca    8.2<L>      23 Apr 2023 05:30  Phos  2.5     04-23  Mg     1.9     04-23            RADIOLOGY & ADDITIONAL TESTS:  Reviewed INTERVAL HPI/OVERNIGHT EVENTS:  Patient was seen and examined at bedside. As per nurse and patient, no o/n events, patient resting comfortably. No complaints at this time. Patient denies: fever, chills, dizziness, weakness, HA, Changes in vision, CP, palpitations, SOB, cough, N/V/D/C, dysuria, changes in bowel movements, LE edema. ROS otherwise negative.    VITAL SIGNS:  T(F): 98.7 (04-23-23 @ 06:14)  HR: 80 (04-23-23 @ 04:53)  BP: 160/76 (04-23-23 @ 04:53)  RR: 18 (04-23-23 @ 04:53)  SpO2: 98% (04-23-23 @ 04:53)  Wt(kg): --      04-22-23 @ 07:01  -  04-23-23 @ 07:00  --------------------------------------------------------  IN: 223 mL / OUT: 1675 mL / NET: -1452 mL    04-23-23 @ 07:01  -  04-23-23 @ 08:54  --------------------------------------------------------  IN: 218 mL / OUT: 250 mL / NET: -32 mL        PHYSICAL EXAM:  GENERAL: elderly gentleman, NAD, well-groomed, well-developed  HEAD:  Atraumatic, Normocephalic  EYES: EOMI, PERRLA, conjunctiva and sclera clear  ENMT: No tonsillar erythema, exudates, or enlargement; Moist mucous membranes, Good dentition, No lesions  NECK: Supple, No JVD, Normal thyroid  General: normocephalic, atraumatic, laying in bed, in no distress  Neuro     MS: eyes open, laughing, alert x2, unintelligible speech w/ presence of cantonese , follows simple commands, cooperative to an extent    CN: PERRL, VF FTC, EOMI and no ptosis bilaterally, sensation intact to crude touch V1-V3, face symmetric, hearing grossly intact    Mot: bulk normal, tone normal, power 5/5 in bilateral upper and lower proximal extremities  Chest: nonlabored respirations, no adventitious lung sounds bilaterally, heart regular rate/rhythm, present S1/S2, no murmurs or rubs  Abdomen: nondistended, soft and nontender without peritoneal signs, normoactive bowel sounds  Extremities: no clubbing, well-perfused, no edema  CHEST/LUNG: Clear to percussion bilaterally; No rales, rhonchi, wheezing, or rubs  HEART: Regular rate and rhythm; No murmurs, rubs, or gallops  ABDOMEN: Soft, Nontender, Nondistended; Bowel sounds present  EXTREMITIES:  2+ Peripheral Pulses, No clubbing, cyanosis, or edema  LYMPH: No lymphadenopathy noted  SKIN: No rashes or lesions    MEDICATIONS  (STANDING):  chlorhexidine 2% Cloths 1 Application(s) Topical <User Schedule>  donepezil 5 milliGRAM(s) Oral at bedtime  enoxaparin Injectable 40 milliGRAM(s) SubCutaneous every 24 hours  finasteride 5 milliGRAM(s) Oral daily  hydrALAZINE 50 milliGRAM(s) Oral every 8 hours  levETIRAcetam  IVPB 500 milliGRAM(s) IV Intermittent every 12 hours  lisinopril 10 milliGRAM(s) Oral daily  magnesium oxide 400 milliGRAM(s) Oral once  metoprolol tartrate 25 milliGRAM(s) Oral two times a day  polyethylene glycol 3350 17 Gram(s) Oral daily  potassium chloride   Solution 10 milliEquivalent(s) Oral once  senna 2 Tablet(s) Oral at bedtime  tamsulosin 0.4 milliGRAM(s) Oral at bedtime    MEDICATIONS  (PRN):  acetaminophen     Tablet .. 650 milliGRAM(s) Oral every 6 hours PRN Temp greater or equal to 38C (100.4F), Mild Pain (1 - 3)      Allergies    No Known Allergies    Intolerances        LABS:                        11.9   7.51  )-----------( 157      ( 23 Apr 2023 05:30 )             35.4     04-23    138  |  103  |  10  ----------------------------<  124<H>  3.9   |  25  |  0.73    Ca    8.2<L>      23 Apr 2023 05:30  Phos  2.5     04-23  Mg     1.9     04-23            RADIOLOGY & ADDITIONAL TESTS:  Reviewed

## 2023-04-23 NOTE — DISCHARGE NOTE PROVIDER - CARE PROVIDERS DIRECT ADDRESSES
,DirectAddress_Unknown,DirectAddress_Unknown ,DirectAddress_Unknown,DirectAddress_Unknown,[email or web address]

## 2023-04-23 NOTE — PROGRESS NOTE ADULT - ASSESSMENT
Assessment and Plan:   · Assessment	  87 year old Cantonese speaking male with PMH of dementia (AOx2 @ baseline, ambulates w/ cane or walker), HTN, prior CVA (on Plavix), BPH presented to [place] ED after unwitnessed falls x2 w/ unknown downtime. CTH showed right frontal cortical IPH, transferred to [facility] for further workup. Stability scan completed and bleed remains stable, patient transferred from NSICU to Stroke Telemetry for further workup           Neuro     #CVA workup   - continue to hold home Plavix (plan to f/u with stroke Neuro outpt about resuming Plavix).  - q4hr stroke neuro checks and vitals   - obtain MRI Brain without contrast to assess for underlying etiology of bleed   - HCT Results  4/20: Right frontal acute parenchymal hemorrhage   - HCT Results 4/21: Stable right frontal intraparenchymal hematoma   - CTA Results: Questionable FMD involving the distal right internal carotid artery   - Continue w/Keppra 500 BID X 7 days for Sz PPx(4/21-4/27)  - vEEG D/C'd- Negative   - Pain control w/ Tylenol   - Stroke education        Cards     #HTN     - -160   - Restarted home hydralazine 25mg daily on 4/21 and increased to 50mg Q8H on 4/22 as pts BP noted to be persistently >170's.  - Added lisinopril 10mg today after BP still > 160  - TTE with bubble: Normal LV systolic function. No PFO.     #Elevated troponin   -Rule out NSTEMI   -Troponin downtrending 0.05 > 0.04 > 0.03 , likely demand ischemia, EKG w/ no new ST elevations, pAF and no c/o CP.  - COM: pAF  - Consider referral for watchmann procedure outpt, but likely will require AC.    #HLD   - LDL results: 89   - Holding statin in setting of IPH    Pulm   - Call provider if SPO2 < 94%   - CT PE protocol completed at [place] negative         GI   #Nutrition/Fluids/Electrolytes    - replete K<4 and Mg <2   - s/p bedside swallow eval with SLP, recommending pureed diet w/ thin liquids   - IVF: None  - Downtrending CK    #Gallbladder wall thickening  - Patient asymptomatic, negative burns's sign  - CT abd/pelvis: Cholelitiasis w/ bladder wall thickening   - abdominal US w/ possible cholecystitis- can f/u outpt w/ GI or Sx for poss HIDA scan i/s/o incidental abd U/S finding. Pt afebrile, LFTs WNL, benign abd exam.        Renal   - Patient with unknown down time, CK elevated c/f rhabdo   - Downtrending CK, IVF D/C'd, encourage PO intake.    Infectious Disease   - Afebrile  - Continue to monitor for leukocytosis    Endocrine   - A1C results: 5.1   - TSH results: 1.76     DVT Prophylaxis   - Started on SQL .  - SCDs for DVT prophylaxis          IDR Goals: Goals reviewed at interdisciplinary rounds with case management, social work, physical therapy, occupational therapy, and speech language pathology.      Please see specific therapy  notes for in depth goals.         Dispo: Acute rehab        Discussed daily hospital plans and goals with patient and family at bedside.            Discussed with Neurology Attending Dr. Mas during rounds.     Assessment and Plan:   Assessment	  87 year old Cantonese speaking male with PMH of dementia (AOx2 @ baseline, ambulates w/ cane or walker), HTN, prior CVA (on Plavix), BPH presented to [place] ED after unwitnessed falls x2 w/ unknown downtime. CTH showed right frontal cortical IPH, transferred to [facility] for further workup. Stability scan completed and bleed remains stable, patient transferred from NSICU to Stroke Telemetry for further workup. Pending MRI.      Neuro     #CVA workup   - continue to hold home Plavix (plan to f/u with stroke Neuro outpt about resuming Plavix).  - q4hr stroke neuro checks and vitals   - obtain MRI Brain without contrast to assess for underlying etiology of bleed   - HCT Results  4/20: Right frontal acute parenchymal hemorrhage   - HCT Results 4/21: Stable right frontal intraparenchymal hematoma   - CTA Results: Questionable FMD involving the distal right internal carotid artery   - Continue w/Keppra 500 BID X 7 days for Sz PPx(4/21-4/27)  - vEEG D/C'd- Negative   - Pain control w/ Tylenol   - Stroke education        Cards     #HTN   - -160   - Restarted home hydralazine 25mg daily on 4/21, increased to 50mg Q8H on 4/22 as pts BP noted to be persistently >170's and further increased to 75mg Q8h for persistent BP elevation >160.  - Added lisinopril 10mg on 4/21 after BP still > 160  - TTE with bubble: Normal LV systolic function. No PFO.     #Elevated troponin   -Rule out NSTEMI   -Troponin downtrending 0.05 > 0.04 > 0.03 , likely demand ischemia, EKG w/ no new ST elevations, pAF and no c/o CP.  - COM: pAF  - Consider referral for watchmann procedure outpt, but likely will require AC.    #HLD   - LDL results: 89   - Holding statin in setting of IPH    Pulm   - Call provider if SPO2 < 94%   - CT PE protocol completed at [place] negative         GI   #Nutrition/Fluids/Electrolytes    - replete K<4 and Mg <2   - s/p bedside swallow eval with SLP, recommending pureed diet w/ thin liquids- Umm well.  - IVF: None  - Downtrending CK    #Gallbladder wall thickening  - Incidental finding in pan scan for Trauma w/u.  - Patient asymptomatic, negative burns's sign  - CT abd/pelvis: Cholelitiasis w/ bladder wall thickening   - abdominal US w/ possible cholecystitis- can f/u outpt w/ GI or Sx for poss HIDA scan i/s/o incidental abd U/S finding. Pt afebrile, LFTs WNL, benign abd exam.  - Lipase/Amylase WNL.        Renal   - Patient with unknown down time, CK elevated c/f rhabdo   - Downtrending CK, IVF D/C'd, encourage PO intake.    Infectious Disease   - Afebrile  - Continue to monitor for leukocytosis    Endocrine   - A1C results: 5.1   - TSH results: 1.76     DVT Prophylaxis   - C/W SQL for DVT PPx  - SCDs for DVT prophylaxis          IDR Goals: Goals reviewed at interdisciplinary rounds with case management, social work, physical therapy, occupational therapy, and speech language pathology.      Please see specific therapy  notes for in depth goals.     Dispo: Acute rehab        Discussed daily hospital plans and goals with patient and family at bedside.            Discussed with Neurology Attending Dr. Mas during rounds.     Assessment and Plan:   Assessment	  87 year old Cantonese speaking male with PMH of dementia (AOx2 @ baseline, ambulates w/ cane or walker), HTN, prior CVA (on Plavix), BPH presented to [place] ED after unwitnessed falls x2 w/ unknown downtime. CTH showed right frontal cortical IPH, transferred to [facility] for further workup. Stability scan completed and bleed remains stable, patient transferred from NSICU to Stroke Telemetry for further workup. Pending MRI.      Neuro     #CVA workup   - continue to hold home Plavix (plan to f/u with stroke Neuro outpt about resuming Plavix).  - q4hr stroke neuro checks and vitals   - obtain MRI Brain without contrast to assess for underlying etiology of bleed   - HCT Results  4/20: Right frontal acute parenchymal hemorrhage   - HCT Results 4/21: Stable right frontal intraparenchymal hematoma   - CTA Results: Questionable FMD involving the distal right internal carotid artery   - Continue w/Keppra 500 BID X 7 days for Sz PPx(4/21-4/27)  - vEEG D/C'd- Negative   - Pain control w/ Tylenol   - Stroke education        Cards     #HTN   - -160   - Restarted home hydralazine 25mg daily on 4/21, increased to 50mg Q8H on 4/22 as pts BP noted to be persistently >170's and further increased to 75mg Q8h for persistent BP elevation >160.  - Added lisinopril 10mg on 4/21 after BP still > 160  - TTE with bubble: Normal LV systolic function. No PFO.     # Back pain  - Age indeterminate T5 and L5 Fx  - C/O pain w/movement  - could be possibly causing tachycardia( sinus tachy on rpt EKG on 4/22)  - Will do Tylenol 650mg PO RTC  - Lidocaine patch X2(upper and lower back)  - Re eval for pain.    #Elevated troponin   -Rule out NSTEMI   -Troponin downtrending 0.05 > 0.04 > 0.03 , likely demand ischemia, EKG w/ no new ST elevations, pAF and no c/o CP.  - COM: pAF  - Consider referral for watchmann procedure outpt, but likely will require AC.    #HLD   - LDL results: 89   - Holding statin in setting of IPH    Pulm   - Call provider if SPO2 < 94%   - CT PE protocol completed at [place] negative         GI   #Nutrition/Fluids/Electrolytes    - replete K<4 and Mg <2   - s/p bedside swallow eval with SLP, recommending pureed diet w/ thin liquids- Umm well.  - IVF: None  - Downtrending CK    #Gallbladder wall thickening  - Incidental finding in pan scan for Trauma w/u.  - Patient asymptomatic, negative burns's sign  - CT abd/pelvis: Cholelitiasis w/ bladder wall thickening   - abdominal US w/ possible cholecystitis- can f/u outpt w/ GI or Sx for poss HIDA scan i/s/o incidental abd U/S finding. Pt afebrile, LFTs WNL, benign abd exam.  - Lipase/Amylase WNL.        Renal   - Patient with unknown down time, CK elevated c/f rhabdo   - Downtrending CK, IVF D/C'd, encourage PO intake.    Infectious Disease   - Afebrile  - Continue to monitor for leukocytosis    Endocrine   - A1C results: 5.1   - TSH results: 1.76     DVT Prophylaxis   - C/W SQL for DVT PPx  - SCDs for DVT prophylaxis          IDR Goals: Goals reviewed at interdisciplinary rounds with case management, social work, physical therapy, occupational therapy, and speech language pathology.      Please see specific therapy  notes for in depth goals.     Dispo: Acute rehab        Discussed daily hospital plans and goals with patient and family at bedside. (spoke with pts daughters @ bedside).           Discussed with Neurology Attending Dr. Mas during rounds.     Assessment and Plan:   Assessment	  87 year old Cantonese speaking male with PMH of dementia (AOx2 @ baseline, ambulates w/ cane or walker), HTN, prior CVA (on Plavix), BPH presented to [place] ED after unwitnessed falls x2 w/ unknown downtime. CTH showed right frontal cortical IPH, transferred to [facility] for further workup. Stability scan completed and bleed remains stable, patient transferred from NSICU to Stroke Telemetry for further workup. Pending MRI.      Neuro     #CVA workup   - continue to hold home Plavix (plan to f/u with stroke Neuro outpt about resuming Plavix).  - q4hr stroke neuro checks and vitals   - obtain MRI Brain without contrast to assess for underlying etiology of bleed   - HCT Results  4/20: Right frontal acute parenchymal hemorrhage   - HCT Results 4/21: Stable right frontal intraparenchymal hematoma   - CTA Results: Questionable FMD involving the distal right internal carotid artery   - Continue w/Keppra 500 BID X 7 days for Sz PPx(4/21-4/27)  - vEEG D/C'd- Negative   - Pain control w/ Tylenol   - Stroke education        Cards     #HTN   - -160   - Restarted home hydralazine 25mg daily on 4/21, increased to 50mg Q8H on 4/22 as pts BP noted to be persistently >170's and further increased to 75mg Q8h for persistent BP elevation >160.  - Added lisinopril 10mg on 4/21 after BP still > 160  - TTE with bubble: Normal LV systolic function. No PFO.     #Elevated troponin   -Rule out NSTEMI   -Troponin downtrending 0.05 > 0.04 > 0.03 , likely demand ischemia, EKG w/ no new ST elevations, pAF and no c/o CP.  - COM: pAF  - Consider referral for watchmann procedure outpt, but likely will require AC.    #pAF  - Known Hx of Afib  - Unclear if pt was on Eliquis prior to 2011(daughters unaware).  - Was started on plavix after extensive discussion w/pts Neuro and cards post cerebellar bleed in 2011.  - Plavix currently held for R Frontal IPH.      # Back pain  - Age indeterminate T5 and L5 Fx  - C/O pain w/movement  - could be possibly causing tachycardia( sinus tachy on rpt EKG on 4/22)  - Will do Tylenol 650mg PO RTC  - Lidocaine patch X2(upper and lower back)  - Re eval for pain.    #HLD   - LDL results: 89   - Holding statin in setting of IPH    Pulm   - Call provider if SPO2 < 94%   - CT PE protocol completed at [place] negative         GI   #Nutrition/Fluids/Electrolytes    - replete K<4 and Mg <2   - s/p bedside swallow eval with SLP, recommending pureed diet w/ thin liquids- Umm well.  - IVF: None  - Downtrending CK    #Gallbladder wall thickening  - Incidental finding in pan scan for Trauma w/u.  - Patient asymptomatic, negative burns's sign  - CT abd/pelvis: Cholelitiasis w/ bladder wall thickening   - abdominal US w/ possible cholecystitis- can f/u outpt w/ GI or Sx for poss HIDA scan i/s/o incidental abd U/S finding. Pt afebrile, LFTs WNL, benign abd exam.  - Lipase/Amylase WNL.        Renal   - Patient with unknown down time, CK elevated c/f rhabdo   - Downtrending CK, IVF D/C'd, encourage PO intake.    Infectious Disease   - Afebrile  - Continue to monitor for leukocytosis    Endocrine   - A1C results: 5.1   - TSH results: 1.76     DVT Prophylaxis   - C/W SQL for DVT PPx  - SCDs for DVT prophylaxis          IDR Goals: Goals reviewed at interdisciplinary rounds with case management, social work, physical therapy, occupational therapy, and speech language pathology.      Please see specific therapy  notes for in depth goals.     Dispo: Acute rehab        Discussed daily hospital plans and goals with patient and family at bedside. (spoke with pts daughters @ bedside).           Discussed with Neurology Attending Dr. Mas during rounds.

## 2023-04-23 NOTE — PROVIDER CONTACT NOTE (OTHER) - RECOMMENDATIONS
medications to be given early
cont to monitor
no treatment at this time
cont to monitor
will recheck bp in 30 minutes

## 2023-04-23 NOTE — DISCHARGE NOTE PROVIDER - NSDCFUSCHEDAPPT_GEN_ALL_CORE_FT
Reinaldo Chand  [facility] Physician [facility]  [facility] [address]  Scheduled Appointment: 05/08/2023

## 2023-04-24 PROBLEM — Z00.00 ENCOUNTER FOR PREVENTIVE HEALTH EXAMINATION: Status: ACTIVE | Noted: 2023-04-24

## 2023-04-24 LAB
ANION GAP SERPL CALC-SCNC: 11 MMOL/L — SIGNIFICANT CHANGE UP (ref 5–17)
BUN SERPL-MCNC: 12 MG/DL — SIGNIFICANT CHANGE UP (ref 7–23)
CALCIUM SERPL-MCNC: 9 MG/DL — SIGNIFICANT CHANGE UP (ref 8.4–10.5)
CHLORIDE SERPL-SCNC: 101 MMOL/L — SIGNIFICANT CHANGE UP (ref 96–108)
CO2 SERPL-SCNC: 25 MMOL/L — SIGNIFICANT CHANGE UP (ref 22–31)
CREAT SERPL-MCNC: 0.72 MG/DL — SIGNIFICANT CHANGE UP (ref 0.5–1.3)
EGFR: 88 ML/MIN/1.73M2 — SIGNIFICANT CHANGE UP
FERRITIN SERPL-MCNC: 164 NG/ML — SIGNIFICANT CHANGE UP (ref 30–400)
GLUCOSE SERPL-MCNC: 119 MG/DL — HIGH (ref 70–99)
HCT VFR BLD CALC: 36.5 % — LOW (ref 39–50)
HGB BLD-MCNC: 12.4 G/DL — LOW (ref 13–17)
IRON SATN MFR SERPL: 25 % — SIGNIFICANT CHANGE UP (ref 16–55)
IRON SATN MFR SERPL: 60 UG/DL — SIGNIFICANT CHANGE UP (ref 45–165)
MAGNESIUM SERPL-MCNC: 2 MG/DL — SIGNIFICANT CHANGE UP (ref 1.6–2.6)
MCHC RBC-ENTMCNC: 33.3 PG — SIGNIFICANT CHANGE UP (ref 27–34)
MCHC RBC-ENTMCNC: 34 GM/DL — SIGNIFICANT CHANGE UP (ref 32–36)
MCV RBC AUTO: 98.1 FL — SIGNIFICANT CHANGE UP (ref 80–100)
NRBC # BLD: 0 /100 WBCS — SIGNIFICANT CHANGE UP (ref 0–0)
PHOSPHATE SERPL-MCNC: 2.8 MG/DL — SIGNIFICANT CHANGE UP (ref 2.5–4.5)
PLATELET # BLD AUTO: 171 K/UL — SIGNIFICANT CHANGE UP (ref 150–400)
POTASSIUM SERPL-MCNC: 3.6 MMOL/L — SIGNIFICANT CHANGE UP (ref 3.5–5.3)
POTASSIUM SERPL-SCNC: 3.6 MMOL/L — SIGNIFICANT CHANGE UP (ref 3.5–5.3)
RBC # BLD: 3.72 M/UL — LOW (ref 4.2–5.8)
RBC # FLD: 12.4 % — SIGNIFICANT CHANGE UP (ref 10.3–14.5)
SODIUM SERPL-SCNC: 137 MMOL/L — SIGNIFICANT CHANGE UP (ref 135–145)
TIBC SERPL-MCNC: 238 UG/DL — SIGNIFICANT CHANGE UP (ref 220–430)
UIBC SERPL-MCNC: 178 UG/DL — SIGNIFICANT CHANGE UP (ref 110–370)
WBC # BLD: 6.41 K/UL — SIGNIFICANT CHANGE UP (ref 3.8–10.5)
WBC # FLD AUTO: 6.41 K/UL — SIGNIFICANT CHANGE UP (ref 3.8–10.5)

## 2023-04-24 PROCEDURE — 70551 MRI BRAIN STEM W/O DYE: CPT | Mod: 26

## 2023-04-24 PROCEDURE — 99233 SBSQ HOSP IP/OBS HIGH 50: CPT

## 2023-04-24 PROCEDURE — 99232 SBSQ HOSP IP/OBS MODERATE 35: CPT

## 2023-04-24 RX ORDER — HYDRALAZINE HCL 50 MG
50 TABLET ORAL EVERY 8 HOURS
Refills: 0 | Status: DISCONTINUED | OUTPATIENT
Start: 2023-04-24 | End: 2023-04-28

## 2023-04-24 RX ORDER — TRAMADOL HYDROCHLORIDE 50 MG/1
25 TABLET ORAL ONCE
Refills: 0 | Status: DISCONTINUED | OUTPATIENT
Start: 2023-04-24 | End: 2023-04-24

## 2023-04-24 RX ADMIN — Medication 50 MILLIGRAM(S): at 21:17

## 2023-04-24 RX ADMIN — TRAMADOL HYDROCHLORIDE 25 MILLIGRAM(S): 50 TABLET ORAL at 22:20

## 2023-04-24 RX ADMIN — LISINOPRIL 10 MILLIGRAM(S): 2.5 TABLET ORAL at 05:29

## 2023-04-24 RX ADMIN — Medication 650 MILLIGRAM(S): at 00:53

## 2023-04-24 RX ADMIN — LIDOCAINE 1 PATCH: 4 CREAM TOPICAL at 11:38

## 2023-04-24 RX ADMIN — TAMSULOSIN HYDROCHLORIDE 0.4 MILLIGRAM(S): 0.4 CAPSULE ORAL at 21:22

## 2023-04-24 RX ADMIN — Medication 650 MILLIGRAM(S): at 06:25

## 2023-04-24 RX ADMIN — DONEPEZIL HYDROCHLORIDE 5 MILLIGRAM(S): 10 TABLET, FILM COATED ORAL at 21:22

## 2023-04-24 RX ADMIN — Medication 650 MILLIGRAM(S): at 05:28

## 2023-04-24 RX ADMIN — Medication 1 MILLIGRAM(S): at 23:29

## 2023-04-24 RX ADMIN — FINASTERIDE 5 MILLIGRAM(S): 5 TABLET, FILM COATED ORAL at 11:33

## 2023-04-24 RX ADMIN — Medication 100 MILLIGRAM(S): at 05:29

## 2023-04-24 RX ADMIN — LIDOCAINE 1 PATCH: 4 CREAM TOPICAL at 01:00

## 2023-04-24 RX ADMIN — ENOXAPARIN SODIUM 40 MILLIGRAM(S): 100 INJECTION SUBCUTANEOUS at 17:09

## 2023-04-24 RX ADMIN — LEVETIRACETAM 400 MILLIGRAM(S): 250 TABLET, FILM COATED ORAL at 05:28

## 2023-04-24 RX ADMIN — SENNA PLUS 2 TABLET(S): 8.6 TABLET ORAL at 21:19

## 2023-04-24 RX ADMIN — LIDOCAINE 1 PATCH: 4 CREAM TOPICAL at 23:30

## 2023-04-24 RX ADMIN — LIDOCAINE 1 PATCH: 4 CREAM TOPICAL at 19:46

## 2023-04-24 RX ADMIN — TRAMADOL HYDROCHLORIDE 25 MILLIGRAM(S): 50 TABLET ORAL at 21:21

## 2023-04-24 RX ADMIN — Medication 650 MILLIGRAM(S): at 17:09

## 2023-04-24 RX ADMIN — LEVETIRACETAM 400 MILLIGRAM(S): 250 TABLET, FILM COATED ORAL at 17:10

## 2023-04-24 RX ADMIN — Medication 650 MILLIGRAM(S): at 18:09

## 2023-04-24 RX ADMIN — Medication 650 MILLIGRAM(S): at 11:33

## 2023-04-24 RX ADMIN — Medication 50 MILLIGRAM(S): at 05:29

## 2023-04-24 RX ADMIN — Medication 650 MILLIGRAM(S): at 12:33

## 2023-04-24 RX ADMIN — Medication 50 MILLIGRAM(S): at 17:09

## 2023-04-24 NOTE — PROGRESS NOTE ADULT - SUBJECTIVE AND OBJECTIVE BOX
Neurology Stroke Progress Note    INTERVAL HPI/OVERNIGHT EVENTS:  Pt seen an examined at bedside with Liam  number #[id number] used. Pt responds appropriately responds to most questions asked via the  however noted  waxing and waning of mental status. Alert, awake, oriented X 2. Pt not in any acute distress. Denies any pain/any complaints No HA/CP/Abd pain. Abd non tender. UOP +. Umm PO intake well. Appears euvolemic. Pending MRI. 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    Per D/W Daughters @ bedside, pt has been having ? cognitive decline since prior stroke which affected his balance, was given cane which pt refuses to use. Also has been having intentional tremors and was told by the neurologist ? may not be parkinson's Also endorses falls prior to hospitalization without head strike and pt exhibiting abnormal behaviour like rigid or clenching UE w/ rigid facial muscles, legs extended and stiff, and some starring episodes, ? foam in the mouth which lasted for abt 1min. Pt has been stressed lately abt his wife's health who has parkinson's disease and was noted to be tearful/sad. Family denies any Hx of Sz. 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 Per family, pt always knew how to check BP, but past 3-4 weeks, pt was unable to check his BP(forgot how to use BP machine) even with written instructions, but states has been taking his meds regularly??.    Though pt denies any pain when asked even w/ , was C/O back pain w/movement to the family, given the language barrier, will change tylenol to RTC and add lidocaine patch to back given his age indeterminate T5 and L5 Fx seen in CT as part of trauma W/U.    Reg BP : AM BP >160, Hydral 25mg PO X 1 ordered and will increase hydral to 75mg po Q8H.    Patient seen and examined.  number ______ used.    MEDICATIONS  (STANDING):  acetaminophen     Tablet .. 650 milliGRAM(s) Oral every 6 hours  donepezil 5 milliGRAM(s) Oral at bedtime  enoxaparin Injectable 40 milliGRAM(s) SubCutaneous every 24 hours  finasteride 5 milliGRAM(s) Oral daily  hydrALAZINE 50 milliGRAM(s) Oral every 8 hours  levETIRAcetam  IVPB 500 milliGRAM(s) IV Intermittent every 12 hours  lidocaine   4% Patch 1 Patch Transdermal every 24 hours  lidocaine   4% Patch 1 Patch Transdermal every 24 hours  metoprolol tartrate 50 milliGRAM(s) Oral every 12 hours  polyethylene glycol 3350 17 Gram(s) Oral daily  senna 2 Tablet(s) Oral at bedtime  tamsulosin 0.4 milliGRAM(s) Oral at bedtime    MEDICATIONS  (PRN):      Allergies    No Known Allergies    Intolerances        Vital Signs Last 24 Hrs  T(C): 37.1 (24 Apr 2023 09:02), Max: 37.2 (23 Apr 2023 14:02)  T(F): 98.7 (24 Apr 2023 09:02), Max: 98.9 (23 Apr 2023 14:02)  HR: 73 (24 Apr 2023 11:51) (68 - 106)  BP: 131/59 (24 Apr 2023 11:51) (95/51 - 174/87)  BP(mean): 84 (24 Apr 2023 11:51) (67 - 142)  RR: 17 (24 Apr 2023 11:51) (14 - 20)  SpO2: 98% (24 Apr 2023 11:51) (97% - 99%)    Parameters below as of 24 Apr 2023 11:51  Patient On (Oxygen Delivery Method): room air        Physical exam:  General: No acute distress, awake and alert  Eyes: Anicteric sclerae, moist conjunctivae, see below for CNs  Neck: trachea midline, FROM, supple, no thyromegaly or lymphadenopathy  Cardiovascular: Regular rate and rhythm, no murmurs, rubs, or gallops. No carotid bruits.   Pulmonary: Anterior breath sounds clear bilaterally, no crackles or wheezing. No use of accessory muscles  GI: Abdomen soft, non-distended, non-tender  Extremities: Radial and DP pulses +2, no edema    Neurologic:  -Mental status: Awake, alert, oriented to person, place, and time. Speech is fluent with intact naming, repetition, and comprehension, no dysarthria. Recent and remote memory intact. Follows commands. Attention/concentration intact. Fund of knowledge appropriate.  -Cranial nerves:   II: Visual fields are full to confrontation.  III, IV, VI: Extraocular movements are intact without nystagmus. Pupils equally round and reactive to light  V:  Facial sensation V1-V3 equal and intact   VII: Face is symmetric with normal eye closure and smile  VIII: Hearing is bilaterally intact to finger rub  IX, X: Uvula is midline and soft palate rises symmetrically  XI: Head turning and shoulder shrug are intact.  XII: Tongue protrudes midline  Motor: Normal bulk and tone. No pronator drift. Strength bilateral upper extremity 5/5, bilateral lower extremities 5/5.  Rapid alternating movements intact and symmetric  Sensation: Intact to light touch bilaterally. No neglect or extinction on double simultaneous testing.  Coordination: No dysmetria on finger-to-nose and heel-to-shin bilaterally  Reflexes: Downgoing toes bilaterally   Gait: Narrow gait and steady    LABS:                        12.4   6.41  )-----------( 171      ( 24 Apr 2023 06:48 )             36.5     04-24    137  |  101  |  12  ----------------------------<  119<H>  3.6   |  25  |  0.72    Ca    9.0      24 Apr 2023 06:48  Phos  2.8     04-24  Mg     2.0     04-24            RADIOLOGY & ADDITIONAL TESTS:     Neurology Stroke Progress Note    INTERVAL HPI/OVERNIGHT EVENTS:  Pt seen an examined at bedside with Liam  number #[id number] used. Pt responds appropriately responds to most questions asked via the  however noted  waxing and waning of mental status. Alert, awake, oriented X 2.     Pt not in any acute distress however when asked if he has pain, he states in his back. Pt has an age indeterminate T5 and L5 Fx. Pt receiving lidocaine patch and tylenol 560 Q6 standing for this. Denies any headache, chest pain or abdominal pain. Pending MRI.    Pt blood pressure was elevated overnight >170 and hydralazine increased to 100mg. However today, BP was < 100 systolic in early AM and then 115 systolic mid morning with hydralazine held. Pt on multiple blood pressure medications. D/C lisinopril. Hydralazine changed to 50mg with hold parameters of 120 systolic. Continue on 50mg metoprolol.           MEDICATIONS  (STANDING):  acetaminophen     Tablet .. 650 milliGRAM(s) Oral every 6 hours  donepezil 5 milliGRAM(s) Oral at bedtime  enoxaparin Injectable 40 milliGRAM(s) SubCutaneous every 24 hours  finasteride 5 milliGRAM(s) Oral daily  hydrALAZINE 50 milliGRAM(s) Oral every 8 hours  levETIRAcetam  IVPB 500 milliGRAM(s) IV Intermittent every 12 hours  lidocaine   4% Patch 1 Patch Transdermal every 24 hours  lidocaine   4% Patch 1 Patch Transdermal every 24 hours  metoprolol tartrate 50 milliGRAM(s) Oral every 12 hours  polyethylene glycol 3350 17 Gram(s) Oral daily  senna 2 Tablet(s) Oral at bedtime  tamsulosin 0.4 milliGRAM(s) Oral at bedtime    MEDICATIONS  (PRN):      Allergies    No Known Allergies    Intolerances        Vital Signs Last 24 Hrs  T(C): 37.1 (24 Apr 2023 09:02), Max: 37.2 (23 Apr 2023 14:02)  T(F): 98.7 (24 Apr 2023 09:02), Max: 98.9 (23 Apr 2023 14:02)  HR: 73 (24 Apr 2023 11:51) (68 - 106)  BP: 131/59 (24 Apr 2023 11:51) (95/51 - 174/87)  BP(mean): 84 (24 Apr 2023 11:51) (67 - 142)  RR: 17 (24 Apr 2023 11:51) (14 - 20)  SpO2: 98% (24 Apr 2023 11:51) (97% - 99%)    Parameters below as of 24 Apr 2023 11:51  Patient On (Oxygen Delivery Method): room air        Physical exam:  General: No acute distress, awake and alert. Oriented to person and place but not time.   Eyes: Anicteric sclerae, moist conjunctivae, see below for CNs  Neck: trachea midline, FROM  Cardiovascular: Irregular (noted to have Afib at times)  Pulmonary: Anterior breath sounds clear bilaterally, No use of accessory muscles.  GI: Abdomen soft, non-distended, non-tender      Neurologic:    Neurologic:  -Mental status: Awake, alert, oriented to person(self, able to state his name and age, unable to state birth year), place (states as hospital), but not to time. Waxing and waning mental status. Speech is fluent with intact naming, repetition, and comprehension, no dysarthria. Decreased attention span and sometimes require repetition of instructions. Follows some simple commands      -Cranial nerves:   II: hard to assess Visual fields as pt has difficulty following instructions, BTT Intact.  III, IV, VI: Extraocular movements are intact without nystagmus. Pupils equally round and reactive to light.  V:  Facial sensation V1-V3 equal and intact - states he feels being touched.  VII: Face is symmetric with normal eye closure and smile  VIII: Hearing is bilaterally intact to finger rub  IX, X: Uvula is midline and soft palate rises symmetrically  XI: Head turning and shoulder shrug are intact.  XII: Tongue protrudes midline  Motor: Decreased bulk and tone. No pronator drift. Hard to assess strength 2/2 following commands to some extent. B/L UE atleast 4/5, antigravity, able to hold for 10seconds, no drift noted.  B/L LE : 4/5 with some bobbing in place (baseline LE weakness per family).  Sensation: Intact to light touch bilaterally. hard to assess for DSST.  Coordination: Hard to assess given pts intentional tremors.  Gait: Deferred.      LABS:                        12.4   6.41  )-----------( 171      ( 24 Apr 2023 06:48 )             36.5     04-24    137  |  101  |  12  ----------------------------<  119<H>  3.6   |  25  |  0.72    Ca    9.0      24 Apr 2023 06:48  Phos  2.8     04-24  Mg     2.0     04-24            RADIOLOGY & ADDITIONAL TESTS:    CT Head No Cont (04.21.23 @ 01:35)   Impression:    Stable right frontal intraparenchymal hematoma.

## 2023-04-24 NOTE — PROGRESS NOTE ADULT - SUBJECTIVE AND OBJECTIVE BOX
INCOMPLETE NOTE    Surgeon: Dr. Chand    Requesting Physician: Dr. Bal    HISTORY OF PRESENT ILLNESS:  86 YO Cantonese-speaking Male w/ PMHx of dementia (AOx2 @ baseline, ambulates w/ cane or walker), HTN, A-Fib (unknown if on Eliquis), prior CVA (2011, on Plavix), BPH who originally presented to [place] ED after unwitnessed falls x2 w/ unknown downtime. CTH revealed right frontal cortical IPH, transferred to [facility] for further workup. Stability scan completed and bleed remains stable, patient transferred from NSICU to Stroke Telemetry for further workup. Structural heart team consulted for possible Watchman device.     PAST MEDICAL & SURGICAL HISTORY:  BPH (benign prostatic hyperplasia)    Irregular heart rate    Lumbar disc herniation    Hypertension    Stroke    No significant past surgical history    MEDICATIONS  (STANDING):  acetaminophen     Tablet .. 650 milliGRAM(s) Oral every 6 hours  donepezil 5 milliGRAM(s) Oral at bedtime  enoxaparin Injectable 40 milliGRAM(s) SubCutaneous every 24 hours  finasteride 5 milliGRAM(s) Oral daily  hydrALAZINE 50 milliGRAM(s) Oral every 8 hours  levETIRAcetam  IVPB 500 milliGRAM(s) IV Intermittent every 12 hours  lidocaine   4% Patch 1 Patch Transdermal every 24 hours  lidocaine   4% Patch 1 Patch Transdermal every 24 hours  metoprolol tartrate 50 milliGRAM(s) Oral every 12 hours  polyethylene glycol 3350 17 Gram(s) Oral daily  senna 2 Tablet(s) Oral at bedtime  tamsulosin 0.4 milliGRAM(s) Oral at bedtime    MEDICATIONS  (PRN):    Allergies    No Known Allergies    Intolerances    SOCIAL HISTORY:  Smoker:  YES / NO        PACK YEARS:                         WHEN QUIT?  ETOH use:  YES / NO               FREQUENCY / QUANTITY:  Ilicit Drug use:  YES / NO  Occupation:  Assisted device use (Cane / Walker):  Live with:    FAMILY HISTORY:  No pertinent family history in first degree relatives    Review of Systems:  CONSTITUTIONAL: Denies fevers / chills, sweats, fatigue, weight loss, weight gain                                       NEURO:  Denies parathesias, seizures, syncope, confusion                                                                                  EYES:  Denies blurry vision, discharge, pain, loss of vision                                                                                    ENMT:  Denies difficulty hearing, vertigo, dysphagia, epistaxis, recent dental work                                       CV:  Denies chest pain, palpitations, PEÑALOZA, orthopnea                                                                                           RESPIRATORY:  Denies wWheezing, SOB, cough / sputum, hemoptysis                                                               GI:  Denies nausea, vomiting, diarrhea, constipation, melena                                                                          : Denies hematuria, dysuria, urgency, incontinence                                                                                          MUSKULOSKELETAL:  Denies arthritis, joint swelling, muscle weakness                                                             SKIN/BREAST:  Denies rash, itching, hair loss, masses                                                                                              PSYCH:  Denies depression, anxiety, suicidal ideation                                                                                                HEME/LYMPH:  Denies bruises easily, enlarged lymph nodes, tender lymph nodes                                          ENDOCRINE:  Denies cold intolerance, heat intolerance, polydipsia                                                                      Vital Signs Last 24 Hrs  T(C): 36.8 (24 Apr 2023 17:56), Max: 37.1 (24 Apr 2023 09:02)  T(F): 98.3 (24 Apr 2023 17:56), Max: 98.7 (24 Apr 2023 09:02)  HR: 75 (24 Apr 2023 17:04) (68 - 92)  BP: 124/69 (24 Apr 2023 17:04) (95/51 - 174/87)  BP(mean): 92 (24 Apr 2023 17:04) (67 - 142)  RR: 18 (24 Apr 2023 17:04) (14 - 20)  SpO2: 99% (24 Apr 2023 17:04) (97% - 99%)    Parameters below as of 24 Apr 2023 17:04  Patient On (Oxygen Delivery Method): room air    PHYSICAL EXAM:  GENERAL: NAD, lying in bed comfortably  HEAD:  Atraumatic, Normocephalic  EYES: EOMI, PERRLA, conjunctiva and sclera clear  ENT: Moist mucous membranes  NECK: Supple, No JVD  CHEST/LUNG: Clear to auscultation bilaterally; No rales, rhonchi, wheezing, or rubs. Unlabored respirations  HEART: Regular rate and rhythm; No murmurs, rubs, or gallops  ABDOMEN: Bowel sounds present; Soft, Nontender, Nondistended. No hepatomegally  EXTREMITIES:  2+ Peripheral Pulses, brisk capillary refill. No clubbing, cyanosis, or edema  NERVOUS SYSTEM:  Alert & Oriented X3, speech clear. No deficits                                                           LABS:                        12.4   6.41  )-----------( 171      ( 24 Apr 2023 06:48 )             36.5     04-24    137  |  101  |  12  ----------------------------<  119<H>  3.6   |  25  |  0.72    Ca    9.0      24 Apr 2023 06:48  Phos  2.8     04-24  Mg     2.0     04-24    RADIOLOGY & ADDITIONAL STUDIES:  CT Head and Neck:  < from: CT Angio Neck w/ IV Cont (04.21.23 @ 01:09) >  FINDINGS: The CTA examination demonstrates the right common carotid   artery to be normal in caliber. There is a normal bifurcation into the   right internal and external carotid arteries. There is no hemodynamically   significant stenosis. There is a ectatic course to the distal right   internal carotid artery prior to the skull base with a mildly beaded   contour (series 9 image 44). This could represent fibromuscular dysplasia   (FMD).    The left common carotid artery is normal in caliber. There is a normal   bifurcation into the left internal and external carotid arteries. There   is no hemodynamically significant stenosis.    The vertebral arteries are normal in caliber.    The aortic arch appears intact without narrowing of the origin of the   great vessels.    IMPRESSION: Questionable FMD involving the distal right internal carotid   artery.    TTE / RUDY:  < from: Echocardiogram w/ Bubble and Doppler (04.21.23 @ 15:03) >  -----  CONCLUSIONS:     1. Normal left ventricular size and systolic function.   2. Normal right ventricular size and systolic function.   3. Normal atria.   4. Trace aortic regurgitation.   5. Aortic sclerosis without significant stenosis.   6. No evidence of pulmonary hypertension.   7. No pericardial effusion.   8. The interatrial septum appears intact. Injection of agitated saline   via a peripheral vein reveals no evidence of a right-to-left shunt.     Surgeon: Dr. Chand    Requesting Physician: Dr. Bal    HISTORY OF PRESENT ILLNESS:  88 YO Cantonese-speaking Male w/ PMHx of dementia (AOx2 @ baseline, ambulates w/ cane or walker), HTN, A-Fib (unknown if previously on AC), prior CVA (2011, on Plavix), previous IPH ([facility], 9/2021) BPH who originally presented to [place] ED after unwitnessed falls x2 w/ unknown downtime. CTH revealed right frontal cortical IPH, transferred to [facility] for further workup. Stability scan completed and bleed remains stable, [facility] further workup. Structural heart team consulted for possible Watchman device. Patient seen and examined at bedside, no family present. [employer]  ID#[id number] utilized. Patient A&O x 2, poor historian and offers no complaints.    PAST MEDICAL & SURGICAL HISTORY:  BPH (benign prostatic hyperplasia)    Irregular heart rate    Lumbar disc herniation    Hypertension    Stroke    No significant past surgical history    MEDICATIONS  (STANDING):  acetaminophen     Tablet .. 650 milliGRAM(s) Oral every 6 hours  donepezil 5 milliGRAM(s) Oral at bedtime  enoxaparin Injectable 40 milliGRAM(s) SubCutaneous every 24 hours  finasteride 5 milliGRAM(s) Oral daily  hydrALAZINE 50 milliGRAM(s) Oral every 8 hours  levETIRAcetam  IVPB 500 milliGRAM(s) IV Intermittent every 12 hours  lidocaine   4% Patch 1 Patch Transdermal every 24 hours  lidocaine   4% Patch 1 Patch Transdermal every 24 hours  metoprolol tartrate 50 milliGRAM(s) Oral every 12 hours  polyethylene glycol 3350 17 Gram(s) Oral daily  senna 2 Tablet(s) Oral at bedtime  tamsulosin 0.4 milliGRAM(s) Oral at bedtime    MEDICATIONS  (PRN):    Allergies    No Known Allergies    Intolerances    SOCIAL HISTORY:  Assisted device use (Cane / Walker): cane or walker    FAMILY HISTORY:  No pertinent family history in first degree relatives    Review of Systems:  CONSTITUTIONAL: Denies fevers / chills, sweats, fatigue, weight loss, weight gain                                       NEURO:  Denies parathesias, seizures, syncope, confusion                                                                                  EYES:  Denies blurry vision, discharge, pain, loss of vision                                                                                    ENMT:  Denies difficulty hearing, vertigo, dysphagia, epistaxis, recent dental work                                       CV:  Denies chest pain, palpitations, PEÑALOZA, orthopnea                                                                                           RESPIRATORY:  Denies wWheezing, SOB, cough / sputum, hemoptysis                                                               GI:  Denies nausea, vomiting, diarrhea, constipation, melena                                                                          : Denies hematuria, dysuria, urgency, incontinence                                                                                          MUSKULOSKELETAL:  Denies arthritis, joint swelling, muscle weakness                                                             SKIN/BREAST:  Denies rash, itching, hair loss, masses                                                                                              PSYCH:  Denies depression, anxiety, suicidal ideation                                                                                                HEME/LYMPH:  Denies bruises easily, enlarged lymph nodes, tender lymph nodes                                          ENDOCRINE:  Denies cold intolerance, heat intolerance, polydipsia                                                                      Vital Signs Last 24 Hrs  T(C): 36.8 (24 Apr 2023 17:56), Max: 37.1 (24 Apr 2023 09:02)  T(F): 98.3 (24 Apr 2023 17:56), Max: 98.7 (24 Apr 2023 09:02)  HR: 75 (24 Apr 2023 17:04) (68 - 92)  BP: 124/69 (24 Apr 2023 17:04) (95/51 - 174/87)  BP(mean): 92 (24 Apr 2023 17:04) (67 - 142)  RR: 18 (24 Apr 2023 17:04) (14 - 20)  SpO2: 99% (24 Apr 2023 17:04) (97% - 99%)    Parameters below as of 24 Apr 2023 17:04  Patient On (Oxygen Delivery Method): room air    PHYSICAL EXAM:  GENERAL: NAD, lying in bed comfortably  HEAD:  Atraumatic, Normocephalic  EYES: EOMI, PERRLA, conjunctiva and sclera clear  ENT: Moist mucous membranes  NECK: Supple, No JVD  CHEST/LUNG: CTAB  HEART: Regular rate and rhythm; No murmurs, rubs, or gallops  ABDOMEN: Bowel sounds present; Soft, Nontender, Nondistended. No hepatomegally  EXTREMITIES:  2+ Peripheral Pulses, brisk capillary refill. No clubbing, cyanosis, or edema  NERVOUS SYSTEM:  Alert & Oriented X2 (does not know year)                                                          LABS:                        12.4   6.41  )-----------( 171      ( 24 Apr 2023 06:48 )             36.5     04-24    137  |  101  |  12  ----------------------------<  119<H>  3.6   |  25  |  0.72    Ca    9.0      24 Apr 2023 06:48  Phos  2.8     04-24  Mg     2.0     04-24    RADIOLOGY & ADDITIONAL STUDIES:  CT Head and Neck:  < from: CT Angio Neck w/ IV Cont (04.21.23 @ 01:09) >  FINDINGS: The CTA examination demonstrates the right common carotid   artery to be normal in caliber. There is a normal bifurcation into the   right internal and external carotid arteries. There is no hemodynamically   significant stenosis. There is a ectatic course to the distal right   internal carotid artery prior to the skull base with a mildly beaded   contour (series 9 image 44). This could represent fibromuscular dysplasia   (FMD).    The left common carotid artery is normal in caliber. There is a normal   bifurcation into the left internal and external carotid arteries. There   is no hemodynamically significant stenosis.    The vertebral arteries are normal in caliber.    The aortic arch appears intact without narrowing of the origin of the   great vessels.    IMPRESSION: Questionable FMD involving the distal right internal carotid   artery.    < from: CT Head No Cont (04.21.23 @ 01:35) >    Findings:    There is hyperdensity within the intracranial vessels due to recent   contrast administration. There is no significant change in the right   frontal intraparenchymal hematoma which measures 2.8 cm AP by 1.8 cm   transverse by 2.8 cm craniocaudal. There is surrounding vasogenicedema   and mild mass effect. No herniation.    The ventricles are stable in size and configuration. No acute   hydrocephalus. No new intraparenchymal hemorrhage..    No interval demarcated territorial infarction..    No acute calvarial fracture..    There is sclerosis of the left mastoid air cells and partial   opacification of the right mastoid air cells. The paranasal sinuses are   well-aerated.    Impression:    Stable right frontal intraparenchymal hematoma..    < end of copied text >      TTE / RUDY:  < from: Echocardiogram w/ Bubble and Doppler (04.21.23 @ 15:03) >  -----  CONCLUSIONS:     1. Normal left ventricular size and systolic function.   2. Normal right ventricular size and systolic function.   3. Normal atria.   4. Trace aortic regurgitation.   5. Aortic sclerosis without significant stenosis.   6. No evidence of pulmonary hypertension.   7. No pericardial effusion.   8. The interatrial septum appears intact. Injection of agitated saline   via a peripheral vein reveals no evidence of a right-to-left shunt.

## 2023-04-24 NOTE — PROGRESS NOTE ADULT - ASSESSMENT
Assesment:  86 YO Cantonese-speaking Male w/ PMHx of dementia (AOx2 @ baseline, ambulates w/ cane or walker), HTN, A-Fib (unknown if on Eliquis), prior CVA (2011, on Plavix), BPH who originally presented to [place] ED after unwitnessed falls x2 w/ unknown downtime. CTH revealed right frontal cortical IPH, transferred to [facility] for further workup. Stability scan completed and bleed remains stable, patient transferred from NSICU to Stroke Telemetry for further workup. Structural heart team consulted for possible Watchman device.     Plan:  Problem 1: R frontal IPH  -    Problem 2: A-Fib  -AC held 2/2 IPH  -Cont BB  -Care per primary team    Problem 3: HTN  -Cont Anti-HTN medications as indicated  -Care per primary team    Problem 4:    I have reviewed clinical labs tests and reports, radiology tests and reports, as well as old patient medical records, and discussed with the refering physician.     Assesment:  86 YO Cantonese-speaking Male w/ PMHx of dementia (AOx2 @ baseline, ambulates w/ cane or walker), HTN, A-Fib (unknown if on Eliquis), prior CVA (2011, on Plavix), BPH who originally presented to [place] ED after unwitnessed falls x2 w/ unknown downtime. CTH revealed right frontal cortical IPH, transferred to [facility] for further workup. Stability scan completed and bleed remains stable, patient transferred from NSICU to Stroke Telemetry for further workup. Structural heart team consulted for possible Watchman device.     Plan:  Problem 1: R frontal IPH  -CT head 4/21: stable R frontal intraparenchymal hematoma  -TTE w/ bubble 4/21: no PFO  -MRI pending  -Will likely need Watchman device in the future as outpatient. Will discuss further with Dr. Chand and patient's family tomorrow.  -Structural heart team will continue to follow    Problem 2: A-Fib  -AC held 2/2 IPH  -Cont BB  -Care per primary team    Problem 3: HTN  -Cont Anti-HTN medications as indicated  -Care per primary team    Problem 4: BPH  -Cont Flomax  -Care per primary team    I have reviewed clinical labs tests and reports, radiology tests and reports, as well as old patient medical records, and discussed with the refering physician.

## 2023-04-24 NOTE — PROGRESS NOTE ADULT - ASSESSMENT
Assessment	  87 year old Cantonese speaking male with PMH of dementia (AOx2 @ baseline, ambulates w/ cane or walker), HTN, prior CVA (on Plavix), BPH presented to [place] ED after unwitnessed falls x2 w/ unknown downtime. CTH showed right frontal cortical IPH, transferred to [facility] for further workup. Stability scan completed and bleed remains stable, patient transferred from NSICU to Stroke Telemetry for further workup. Pending MRI.      Neuro     #CVA workup   - continue to hold home Plavix (plan to f/u with stroke Neuro outpt about resuming Plavix).  - q4hr stroke neuro checks and vitals   - obtain MRI Brain without contrast to assess for underlying etiology of bleed   - HCT Results  4/20: Right frontal acute parenchymal hemorrhage   - HCT Results 4/21: Stable right frontal intraparenchymal hematoma   - CTA Results: Questionable FMD involving the distal right internal carotid artery   - Continue w/Keppra 500 BID X 7 days for Sz PPx(4/21-4/27)  - vEEG D/C'd- Negative   - Pain control w/ Tylenol   - Stroke education        Cards     #HTN   - -160   - Restarted home hydralazine 25mg daily on 4/21, increased to 50mg Q8H on 4/22 as pts BP noted to be persistently >170's and further increased to 75mg Q8h for persistent BP elevation >160.  - Added lisinopril 10mg on 4/21 after BP still > 160  - TTE with bubble: Normal LV systolic function. No PFO.     #Elevated troponin   -Rule out NSTEMI   -Troponin downtrending 0.05 > 0.04 > 0.03 , likely demand ischemia, EKG w/ no new ST elevations, pAF and no c/o CP.  - COM: pAF  - Consider referral for watchmann procedure outpt, but likely will require AC.    #pAF  - Known Hx of Afib  - Unclear if pt was on Eliquis prior to 2011(daughters unaware).  - Was started on plavix after extensive discussion w/pts Neuro and cards post cerebellar bleed in 2011.  - Plavix currently held for R Frontal IPH.      # Back pain  - Age indeterminate T5 and L5 Fx  - C/O pain w/movement  - could be possibly causing tachycardia( sinus tachy on rpt EKG on 4/22)  - Will do Tylenol 650mg PO RTC  - Lidocaine patch X2(upper and lower back)  - Re eval for pain.    #HLD   - LDL results: 89   - Holding statin in setting of H    Pulm   - Call provider if SPO2 < 94%   - CT PE protocol completed at [place] negative         GI   #Nutrition/Fluids/Electrolytes    - replete K<4 and Mg <2   - s/p bedside swallow eval with SLP, recommending pureed diet w/ thin liquids- Umm well.  - IVF: None  - Downtrending CK    #Gallbladder wall thickening  - Incidental finding in pan scan for Trauma w/u.  - Patient asymptomatic, negative burns's sign  - CT abd/pelvis: Cholelitiasis w/ bladder wall thickening   - abdominal US w/ possible cholecystitis- can f/u outpt w/ GI or Sx for poss HIDA scan i/s/o incidental abd U/S finding. Pt afebrile, LFTs WNL, benign abd exam.  - Lipase/Amylase WNL.        Renal   - Patient with unknown down time, CK elevated c/f rhabdo   - Downtrending CK, IVF D/C'd, encourage PO intake.    Infectious Disease   - Afebrile  - Continue to monitor for leukocytosis    Endocrine   - A1C results: 5.1   - TSH results: 1.76     DVT Prophylaxis   - C/W SQL for DVT PPx  - SCDs for DVT prophylaxis          IDR Goals: Goals reviewed at interdisciplinary rounds with case management, social work, physical therapy, occupational therapy, and speech language pathology.      Please see specific therapy  notes for in depth goals.     Dispo: Acute rehab        Discussed daily hospital plans and goals with patient and family at bedside. (spoke with pts daughters @ bedside).          Assessment	  87 year old Cantonese speaking male with PMH of dementia (AOx2 @ baseline, ambulates w/ cane or walker), HTN, prior CVA (on Plavix), BPH presented to [place] ED after unwitnessed falls x2 w/ unknown downtime. CTH showed right frontal cortical IPH, transferred to [facility] for further workup. Stability scan completed and bleed remains stable, patient transferred from NSICU to Stroke Telemetry for further workup. Pending MRI.      Neuro     #CVA workup   - continue to hold home Plavix (plan to f/u with stroke Neuro outpt about resuming Plavix).  - q4hr stroke neuro checks and vitals   - obtain MRI Brain without contrast to assess for underlying etiology of bleed   - HCT Results  4/20: Right frontal acute parenchymal hemorrhage   - HCT Results 4/21: Stable right frontal intraparenchymal hematoma   - CTA Results: Questionable FMD involving the distal right internal carotid artery   - Continue w/Keppra 500 BID X 7 days for seizure prophylaxis (4/21-4/27)  - vEEG D/C'd- Negative   - Pain control w/ Tylenol   - Stroke education        Cards     #HTN   - -160   - Lisinopril 10mg and home hydralazine 25mg restarted on 4/21, hydralazine increased to 50mg Q8H on 4/22 as pts BP noted to be persistently >170's and further increased to 75mg Q8h for persistent BP elevation >160.  - Today BP was < 100 systolic in early AM and then 115 systolic mid morning with hydralazine held. Pt on multiple blood pressure medications. D/C lisinopril. Hydralazine changed to 50mg with hold parameters of 120 systolic. Continue on 50mg metoprolol.   - TTE with bubble: Normal LV systolic function. No PFO.     #Elevated troponin - resolved  -Troponin downtrending 0.05 > 0.04 > 0.03 , likely demand ischemia, EKG w/ no new ST elevations, pAF and no complaints of chest pain   - Consider referral for watchmann procedure outpt, but likely will require AC.    #Afib  - Known Hx of Afib  - Unclear if pt was on Eliquis prior to 2011(daughters unaware).  - Was started on plavix after extensive discussion w/pts Neuro and cards post cerebellar bleed in 2011.  - Plavix currently held for R Frontal IPH.      #HLD   - LDL results: 89   - Holding statin in setting of IPH    Musculoskeletal  # Back pain  - Age indeterminate T5 and L5 Fx  - C/O pain  - could be possibly causing tachycardia (sinus tachy on repeatt EKG on 4/22) and elevated BP on 4/23  - Standing Tylenol 650mg PO Q6  - Lidocaine patch X2(upper and lower back)  - Re eval for pain    Pulm   - Call provider if SPO2 < 94%   - CT PE protocol completed at [place] negative       GI   #Nutrition/Fluids/Electrolytes    - replete K<4 and Mg <2   - s/p bedside swallow eval with SLP, recommending pureed diet w/ thin liquids- Tolerating well.  - IVF: None  - Downtrending CK    #Gallbladder wall thickening  - Incidental finding in pan scan for Trauma w/u.  - Patient asymptomatic, negative burns's sign  - CT abd/pelvis: Cholelitiasis w/ bladder wall thickening   - abdominal US w/ possible cholecystitis- can f/u outpt w/ GI or Sx for poss HIDA scan i/s/o incidental abd U/S finding. Pt afebrile, LFTs WNL, benign abd exam.  - Lipase/Amylase WNL.    Heme  - Hgb: 12.4   - Iron studies WNL not indicative of iron deficiency anemia  - Total Binding Capacity.: 238 ug/dL  - % Saturation, Iron: 25 %  - Iron Total, Serum: 60 ug/dL  - Unsaturated Iron Binding Capacity: 178 ug/dL         Renal   - Patient with unknown down time, CK elevated concerning rhabdo   - Downtrending CK, IVF D/C'd, encourage PO intake.    Infectious Disease   - Afebrile  - Continue to monitor for leukocytosis    Endocrine   - A1C results: 5.1   - TSH results: 1.76     DVT Prophylaxis   - C/W SQL for DVT PPx  - SCDs for DVT prophylaxis          IDR Goals: Goals reviewed at interdisciplinary rounds with case management, social work, physical therapy, occupational therapy, and speech language pathology.      Please see specific therapy  notes for in depth goals.     Dispo: Acute rehab      Discussed daily hospital plans and goals with patient at bedside        Discussed with Neurology Attending Dr. Bal during rounds.

## 2023-04-24 NOTE — PROGRESS NOTE ADULT - SUBJECTIVE AND OBJECTIVE BOX
INTERVAL HPI/OVERNIGHT EVENTS: doyle o/n    SUBJECTIVE: Patient seen and examined at bedside.   Cantonese  used  Pt reports pain in back - states he usually takes tylenol for it. Was not able to quantify level of pain despite multiple attempts. He denies any radiation of numbness in legs/feet.   Denies any headache, chest pain, dyspnea, LH/DIzziness. Eating wo nausea, abd pain. Denies dysuria.    OBJECTIVE:    VITAL SIGNS:  ICU Vital Signs Last 24 Hrs  T(C): 37.1 (24 Apr 2023 09:02), Max: 37.1 (24 Apr 2023 09:02)  T(F): 98.7 (24 Apr 2023 09:02), Max: 98.7 (24 Apr 2023 09:02)  HR: 73 (24 Apr 2023 11:51) (68 - 106)  BP: 131/59 (24 Apr 2023 11:51) (95/51 - 174/87)  BP(mean): 84 (24 Apr 2023 11:51) (67 - 142)  ABP: --  ABP(mean): --  RR: 17 (24 Apr 2023 11:51) (14 - 20)  SpO2: 98% (24 Apr 2023 11:51) (97% - 99%)    O2 Parameters below as of 24 Apr 2023 11:51  Patient On (Oxygen Delivery Method): room air              04-23 @ 07:01 - 04-24 @ 07:00  --------------------------------------------------------  IN: 398 mL / OUT: 875 mL / NET: -477 mL    04-24 @ 07:01  -  04-24 @ 15:03  --------------------------------------------------------  IN: 340 mL / OUT: 0 mL / NET: 340 mL      CAPILLARY BLOOD GLUCOSE          PHYSICAL EXAM:  GEN: Male in NAD on RA  HEENT: NC/AT, MMM  CV: RRR, nml S1S2, no murmurs  PULM: nml effort, CTAB  ABD: Soft, non-distended, NABS, non-tender  NEURO  A/O to self, hospital (states he is in [place])  5/5 in BUE and BLE.   Sensation intact  PSYCH: Appropriate      MEDICATIONS:  MEDICATIONS  (STANDING):  acetaminophen     Tablet .. 650 milliGRAM(s) Oral every 6 hours  donepezil 5 milliGRAM(s) Oral at bedtime  enoxaparin Injectable 40 milliGRAM(s) SubCutaneous every 24 hours  finasteride 5 milliGRAM(s) Oral daily  hydrALAZINE 50 milliGRAM(s) Oral every 8 hours  levETIRAcetam  IVPB 500 milliGRAM(s) IV Intermittent every 12 hours  lidocaine   4% Patch 1 Patch Transdermal every 24 hours  lidocaine   4% Patch 1 Patch Transdermal every 24 hours  metoprolol tartrate 50 milliGRAM(s) Oral every 12 hours  polyethylene glycol 3350 17 Gram(s) Oral daily  senna 2 Tablet(s) Oral at bedtime  tamsulosin 0.4 milliGRAM(s) Oral at bedtime    MEDICATIONS  (PRN):      ALLERGIES:  Allergies    No Known Allergies    Intolerances        LABS:                        12.4   6.41  )-----------( 171      ( 24 Apr 2023 06:48 )             36.5     04-24    137  |  101  |  12  ----------------------------<  119<H>  3.6   |  25  |  0.72    Ca    9.0      24 Apr 2023 06:48  Phos  2.8     04-24  Mg     2.0     04-24            RADIOLOGY & ADDITIONAL TESTS: Reviewed.

## 2023-04-24 NOTE — PROGRESS NOTE ADULT - NUTRITIONAL ASSESSMENT
Neurology Stroke Progress Note    INTERVAL HPI/OVERNIGHT EVENTS:        MEDICATIONS  (STANDING):  chlorhexidine 2% Cloths 1 Application(s) Topical <User Schedule>  donepezil 5 milliGRAM(s) Oral at bedtime  enoxaparin Injectable 40 milliGRAM(s) SubCutaneous every 24 hours  finasteride 5 milliGRAM(s) Oral daily  hydrALAZINE 25 milliGRAM(s) Oral once  hydrALAZINE 75 milliGRAM(s) Oral every 8 hours  levETIRAcetam  IVPB 500 milliGRAM(s) IV Intermittent every 12 hours  lisinopril 10 milliGRAM(s) Oral daily  magnesium oxide 400 milliGRAM(s) Oral once  metoprolol tartrate 25 milliGRAM(s) Oral two times a day  polyethylene glycol 3350 17 Gram(s) Oral daily  potassium chloride   Powder 10 milliEquivalent(s) Oral once  senna 2 Tablet(s) Oral at bedtime  tamsulosin 0.4 milliGRAM(s) Oral at bedtime    MEDICATIONS  (PRN):  acetaminophen     Tablet .. 650 milliGRAM(s) Oral every 6 hours PRN Temp greater or equal to 38C (100.4F), Mild Pain (1 - 3)      Allergies  No Known Allergies      Vital Signs Last 24 Hrs  T(C): 37.1 (04-23-23 @ 06:14), Max: 37.3 (04-22-23 @ 17:10)  T(F): 98.7 (04-23-23 @ 06:14), Max: 99.2 (04-22-23 @ 17:10)  HR: 99 (04-23-23 @ 08:26) (80 - 104)  BP: 167/74 (04-23-23 @ 08:26) (137/63 - 192/91)  BP(mean): 107 (04-23-23 @ 08:26) (90 - 131)  RR: 15 (04-23-23 @ 08:26) (15 - 26)  SpO2: 97% (04-23-23 @ 08:26) (96% - 98%)    Parameters below as of 22 Apr 2023 11:44  Patient On (Oxygen Delivery Method): room air      Physical exam:  General: pleasant elderly male, resting in bed, NAD.  Eyes: Anicteric sclerae, moist conjunctivae, see below for CNs  Neck: trachea midline, FROM.   Cardiovascular: Irregular (Afib @ times).  Pulmonary: Anterior breath sounds clear bilaterally, No use of accessory muscles.  GI: Abdomen soft, non-distended, non-tender      Neurologic:  -Mental status: Awake, alert, oriented to person(self, able to state his name and age, unable to state birth year), place (states as hospital), but not to time. Waxing and waning mental status. Speech is fluent with intact naming, repetition, and comprehension, no dysarthria. Decreased attention span and sometimes require repetition of instructions. Follows some simple commands and mimic some.    -Cranial nerves:   II: hard to assess Visual fields as pt has difficulty following instructions, BTT Intact.  III, IV, VI: Extraocular movements are intact without nystagmus. Pupils equally round and reactive to light.  V:  Facial sensation V1-V3 equal and intact - states he feels being touched.  VII: Face is symmetric with normal eye closure and smile  VIII: Hearing is bilaterally intact to finger rub  IX, X: Uvula is midline and soft palate rises symmetrically  XI: Head turning and shoulder shrug are intact.  XII: Tongue protrudes midline  Motor: Normal bulk and tone. No pronator drift. Hard to assess strength 2/2 inattentiveness and pt has difficulty in following commands to some extent. B/l UE atleast 3/5, antigravity, able to hold for 10seconds, no drift noted.  B/L LE : 4/5 with some bobbing in place(baseline LE weakness per family).  Sensation: Intact to light touch bilaterally. hard to assess for DSST.  Coordination: Hard to assess given pts intentional tremors.  Gait: Deferred.    LABS:                                   11.9   7.51  )-----------( 157      ( 23 Apr 2023 05:30 )             35.4     04-23    138  |  103  |  10  ----------------------------<  124<H>  3.9   |  25  |  0.73    Ca    8.2<L>      23 Apr 2023 05:30  Phos  2.5     04-23  Mg     1.9     04-23      RADIOLOGY & ADDITIONAL TESTS:    CT Head No Cont (04.21.23 @ 01:35)   Impression:    Stable right frontal intraparenchymal hematoma..                Assessment and Plan:   · Assessment	    Assessment and Plan:     Discussed with Neurology Attending Dr. Mas during rounds.

## 2023-04-25 DIAGNOSIS — E78.5 HYPERLIPIDEMIA, UNSPECIFIED: ICD-10-CM

## 2023-04-25 DIAGNOSIS — Z29.9 ENCOUNTER FOR PROPHYLACTIC MEASURES, UNSPECIFIED: ICD-10-CM

## 2023-04-25 DIAGNOSIS — I10 ESSENTIAL (PRIMARY) HYPERTENSION: ICD-10-CM

## 2023-04-25 DIAGNOSIS — F03.90 UNSPECIFIED DEMENTIA, UNSPECIFIED SEVERITY, WITHOUT BEHAVIORAL DISTURBANCE, PSYCHOTIC DISTURBANCE, MOOD DISTURBANCE, AND ANXIETY: ICD-10-CM

## 2023-04-25 DIAGNOSIS — K82.8 OTHER SPECIFIED DISEASES OF GALLBLADDER: ICD-10-CM

## 2023-04-25 DIAGNOSIS — R77.8 OTHER SPECIFIED ABNORMALITIES OF PLASMA PROTEINS: ICD-10-CM

## 2023-04-25 DIAGNOSIS — I48.91 UNSPECIFIED ATRIAL FIBRILLATION: ICD-10-CM

## 2023-04-25 DIAGNOSIS — I63.9 CEREBRAL INFARCTION, UNSPECIFIED: ICD-10-CM

## 2023-04-25 DIAGNOSIS — M54.9 DORSALGIA, UNSPECIFIED: ICD-10-CM

## 2023-04-25 LAB
24R-OH-CALCIDIOL SERPL-MCNC: 62.7 NG/ML — SIGNIFICANT CHANGE UP (ref 30–80)
ALBUMIN SERPL ELPH-MCNC: 3.3 G/DL — SIGNIFICANT CHANGE UP (ref 3.3–5)
ALP SERPL-CCNC: 49 U/L — SIGNIFICANT CHANGE UP (ref 40–120)
ALT FLD-CCNC: 26 U/L — SIGNIFICANT CHANGE UP (ref 10–45)
ANION GAP SERPL CALC-SCNC: 10 MMOL/L — SIGNIFICANT CHANGE UP (ref 5–17)
AST SERPL-CCNC: 33 U/L — SIGNIFICANT CHANGE UP (ref 10–40)
BILIRUB SERPL-MCNC: 0.4 MG/DL — SIGNIFICANT CHANGE UP (ref 0.2–1.2)
BUN SERPL-MCNC: 18 MG/DL — SIGNIFICANT CHANGE UP (ref 7–23)
CALCIUM SERPL-MCNC: 8.6 MG/DL — SIGNIFICANT CHANGE UP (ref 8.4–10.5)
CALCIUM SERPL-MCNC: 8.8 MG/DL — SIGNIFICANT CHANGE UP (ref 8.4–10.5)
CHLORIDE SERPL-SCNC: 104 MMOL/L — SIGNIFICANT CHANGE UP (ref 96–108)
CO2 SERPL-SCNC: 25 MMOL/L — SIGNIFICANT CHANGE UP (ref 22–31)
CREAT SERPL-MCNC: 0.8 MG/DL — SIGNIFICANT CHANGE UP (ref 0.5–1.3)
EGFR: 86 ML/MIN/1.73M2 — SIGNIFICANT CHANGE UP
GLUCOSE SERPL-MCNC: 102 MG/DL — HIGH (ref 70–99)
HCT VFR BLD CALC: 34.3 % — LOW (ref 39–50)
HGB BLD-MCNC: 11.5 G/DL — LOW (ref 13–17)
MAGNESIUM SERPL-MCNC: 2 MG/DL — SIGNIFICANT CHANGE UP (ref 1.6–2.6)
MCHC RBC-ENTMCNC: 32.9 PG — SIGNIFICANT CHANGE UP (ref 27–34)
MCHC RBC-ENTMCNC: 33.5 GM/DL — SIGNIFICANT CHANGE UP (ref 32–36)
MCV RBC AUTO: 98 FL — SIGNIFICANT CHANGE UP (ref 80–100)
NRBC # BLD: 0 /100 WBCS — SIGNIFICANT CHANGE UP (ref 0–0)
PHOSPHATE SERPL-MCNC: 3 MG/DL — SIGNIFICANT CHANGE UP (ref 2.5–4.5)
PLATELET # BLD AUTO: 159 K/UL — SIGNIFICANT CHANGE UP (ref 150–400)
POTASSIUM SERPL-MCNC: 3.8 MMOL/L — SIGNIFICANT CHANGE UP (ref 3.5–5.3)
POTASSIUM SERPL-SCNC: 3.8 MMOL/L — SIGNIFICANT CHANGE UP (ref 3.5–5.3)
PROT SERPL-MCNC: 5.8 G/DL — LOW (ref 6–8.3)
PTH-INTACT FLD-MCNC: 41 PG/ML — SIGNIFICANT CHANGE UP (ref 15–65)
RBC # BLD: 3.5 M/UL — LOW (ref 4.2–5.8)
RBC # FLD: 12.5 % — SIGNIFICANT CHANGE UP (ref 10.3–14.5)
SODIUM SERPL-SCNC: 139 MMOL/L — SIGNIFICANT CHANGE UP (ref 135–145)
WBC # BLD: 6.51 K/UL — SIGNIFICANT CHANGE UP (ref 3.8–10.5)
WBC # FLD AUTO: 6.51 K/UL — SIGNIFICANT CHANGE UP (ref 3.8–10.5)

## 2023-04-25 PROCEDURE — 99232 SBSQ HOSP IP/OBS MODERATE 35: CPT

## 2023-04-25 PROCEDURE — 99233 SBSQ HOSP IP/OBS HIGH 50: CPT

## 2023-04-25 RX ORDER — TRAMADOL HYDROCHLORIDE 50 MG/1
25 TABLET ORAL EVERY 8 HOURS
Refills: 0 | Status: DISCONTINUED | OUTPATIENT
Start: 2023-04-25 | End: 2023-04-28

## 2023-04-25 RX ORDER — QUETIAPINE FUMARATE 200 MG/1
12.5 TABLET, FILM COATED ORAL DAILY
Refills: 0 | Status: DISCONTINUED | OUTPATIENT
Start: 2023-04-25 | End: 2023-04-25

## 2023-04-25 RX ORDER — QUETIAPINE FUMARATE 200 MG/1
12.5 TABLET, FILM COATED ORAL DAILY
Refills: 0 | Status: DISCONTINUED | OUTPATIENT
Start: 2023-04-25 | End: 2023-04-27

## 2023-04-25 RX ORDER — TRAMADOL HYDROCHLORIDE 50 MG/1
12.5 TABLET ORAL EVERY 8 HOURS
Refills: 0 | Status: DISCONTINUED | OUTPATIENT
Start: 2023-04-25 | End: 2023-04-25

## 2023-04-25 RX ORDER — QUETIAPINE FUMARATE 200 MG/1
25 TABLET, FILM COATED ORAL DAILY
Refills: 0 | Status: DISCONTINUED | OUTPATIENT
Start: 2023-04-25 | End: 2023-04-25

## 2023-04-25 RX ADMIN — Medication 50 MILLIGRAM(S): at 05:41

## 2023-04-25 RX ADMIN — Medication 650 MILLIGRAM(S): at 18:31

## 2023-04-25 RX ADMIN — FINASTERIDE 5 MILLIGRAM(S): 5 TABLET, FILM COATED ORAL at 13:11

## 2023-04-25 RX ADMIN — LEVETIRACETAM 400 MILLIGRAM(S): 250 TABLET, FILM COATED ORAL at 17:23

## 2023-04-25 RX ADMIN — LIDOCAINE 1 PATCH: 4 CREAM TOPICAL at 13:12

## 2023-04-25 RX ADMIN — LIDOCAINE 1 PATCH: 4 CREAM TOPICAL at 18:30

## 2023-04-25 RX ADMIN — Medication 50 MILLIGRAM(S): at 17:23

## 2023-04-25 RX ADMIN — TAMSULOSIN HYDROCHLORIDE 0.4 MILLIGRAM(S): 0.4 CAPSULE ORAL at 22:17

## 2023-04-25 RX ADMIN — QUETIAPINE FUMARATE 12.5 MILLIGRAM(S): 200 TABLET, FILM COATED ORAL at 17:23

## 2023-04-25 RX ADMIN — Medication 650 MILLIGRAM(S): at 18:17

## 2023-04-25 RX ADMIN — Medication 650 MILLIGRAM(S): at 06:40

## 2023-04-25 RX ADMIN — ENOXAPARIN SODIUM 40 MILLIGRAM(S): 100 INJECTION SUBCUTANEOUS at 17:23

## 2023-04-25 RX ADMIN — Medication 50 MILLIGRAM(S): at 22:17

## 2023-04-25 RX ADMIN — Medication 650 MILLIGRAM(S): at 05:41

## 2023-04-25 RX ADMIN — Medication 650 MILLIGRAM(S): at 13:12

## 2023-04-25 RX ADMIN — DONEPEZIL HYDROCHLORIDE 5 MILLIGRAM(S): 10 TABLET, FILM COATED ORAL at 22:32

## 2023-04-25 RX ADMIN — Medication 50 MILLIGRAM(S): at 13:12

## 2023-04-25 RX ADMIN — LEVETIRACETAM 400 MILLIGRAM(S): 250 TABLET, FILM COATED ORAL at 05:41

## 2023-04-25 RX ADMIN — Medication 650 MILLIGRAM(S): at 22:17

## 2023-04-25 RX ADMIN — SENNA PLUS 2 TABLET(S): 8.6 TABLET ORAL at 22:18

## 2023-04-25 RX ADMIN — Medication 650 MILLIGRAM(S): at 23:10

## 2023-04-25 RX ADMIN — Medication 650 MILLIGRAM(S): at 14:12

## 2023-04-25 NOTE — PROGRESS NOTE ADULT - SUBJECTIVE AND OBJECTIVE BOX
***ACCEPTANCE NOTE STROKE TELE TO [facility]***    Hospital Course:  87 year old Cantonese speaking male with PMH of dementia (AOx2 @ baseline, ambulates w/ cane or walker), HTN, prior CVA (on Plavix), BPH presented to [place] ED after unwitnessed falls x2 w/ unknown downtime. CTH showed right frontal cortical IPH, transferred to [facility] for further workup. Stability scan completed and bleed remains stable, patient transferred from NSICU to Stroke Telemetry for further workup. MRI completed showing 2.7 cm acute/subacute right frontal lobe hematoma corresponding to CT head. Additional areas of chronic hemorrhage with atrophy may reflect amyloid angiopathy. No acute infarction. Due to evidence of amyloid angiopathy risk of AC outweighs benefit in setting of atrial fibrillation. Discussed with family regarding plan. Structural heart consulted for possible watchmann device, will have outpatient follow up. Course complicated by intermittent sundowning requiring restraints. Started on Seroquel 12.5mg. Pt initially found to have hypertensive episodes >170 systolic despite home dose of hydralazine and metoprolol being given. Lisinopril added and hydralazine uptitrated. However pt found to have underlying pain due to age indeterminate T5 and L5 compression fracture which was likely driving pressure up. Pain addressed with lidocaine patch, acetaminphen and tramadol. Hypotensive episode resulted in D/C of lisinopril and down titration of hydralazine. PT to reevaluate for dispo planning. Pt stepped down from tele stroke regional.       OBJECTIVE:  Vital Signs Last 24 Hrs  T(C): 36.2 (25 Apr 2023 13:20), Max: 36.9 (24 Apr 2023 22:00)  T(F): 97.2 (25 Apr 2023 13:20), Max: 98.5 (24 Apr 2023 22:00)  HR: 67 (25 Apr 2023 14:00) (57 - 80)  BP: 145/68 (25 Apr 2023 14:00) (110/62 - 150/71)  BP(mean): 98 (25 Apr 2023 14:00) (81 - 103)  RR: 17 (25 Apr 2023 14:00) (14 - 20)  SpO2: 99% (25 Apr 2023 14:00) (97% - 99%)    Parameters below as of 25 Apr 2023 14:00  Patient On (Oxygen Delivery Method): room air        Physical Exam:  Gen: NAD, laying in bed, well appearing, alert, interactive  HEENT: PERRL, anicteric sclera, no JVD, no thyromegaly  Cardio: +S1/S2, irregular, no murmurs  Resp: CTA b/l, no w/r/r  GI: +BS x4, NT/ND  Ext: no peripheral edema, NROM x4  Vasc: 3+ peripheral pulses  Skin: warm, dry, and intact. no rashes, wounds or scars  Neuro: AAOx2 (oriented to person and place but not time), no focal deficits, face symmetric  -Cranial nerves:   II: hard to assess Visual fields as pt has difficulty following instructions, BTT Intact.  III, IV, VI: Extraocular movements are intact without nystagmus. Pupils equally round and reactive to light.  V:  Facial sensation V1-V3 equal and intact - states he feels being touched.  VII: Face is symmetric with normal eye closure and smile  VIII: Hearing is bilaterally intact to finger rub  IX, X: Uvula is midline and soft palate rises symmetrically  XI: Head turning and shoulder shrug are intact.  XII: Tongue protrudes midline  Motor: Decreased bulk and tone. No pronator drift. Hard to assess strength 2/2 following commands to some extent. B/L UE atleast 4/5, antigravity, able to hold for 10seconds, no drift noted.  B/L LE : 4/5 with some bobbing in place (baseline LE weakness per family).  Sensation: Intact to light touch bilaterally. hard to assess for DSST.  Coordination: Hard to assess given pts intentional tremors.  Gait: Deferred.      Labs:                        11.5   6.51  )-----------( 159      ( 25 Apr 2023 05:30 )             34.3     04-25    139  |  104  |  18  ----------------------------<  102<H>  3.8   |  25  |  0.80    Ca    8.6      25 Apr 2023 05:30  Phos  3.0     04-25  Mg     2.0     04-25    TPro  5.8<L>  /  Alb  3.3  /  TBili  0.4  /  DBili  x   /  AST  33  /  ALT  26  /  AlkPhos  49  04-25      Fingerstick  glucose:     MEDICATIONS  (STANDING):  acetaminophen     Tablet .. 650 milliGRAM(s) Oral every 6 hours  donepezil 5 milliGRAM(s) Oral at bedtime  enoxaparin Injectable 40 milliGRAM(s) SubCutaneous every 24 hours  finasteride 5 milliGRAM(s) Oral daily  hydrALAZINE 50 milliGRAM(s) Oral every 8 hours  levETIRAcetam  IVPB 500 milliGRAM(s) IV Intermittent every 12 hours  lidocaine   4% Patch 1 Patch Transdermal every 24 hours  lidocaine   4% Patch 1 Patch Transdermal every 24 hours  metoprolol tartrate 50 milliGRAM(s) Oral every 12 hours  polyethylene glycol 3350 17 Gram(s) Oral daily  QUEtiapine 12.5 milliGRAM(s) Oral daily  senna 2 Tablet(s) Oral at bedtime  tamsulosin 0.4 milliGRAM(s) Oral at bedtime  traMADol 12.5 milliGRAM(s) Oral every 8 hours    MEDICATIONS  (PRN):      Allergies    No Known Allergies    Intolerances        RADIOLOGY & ADDITIONAL TESTS: Reviewed.                   ***ACCEPTANCE NOTE STROKE TELE TO [facility]***    Hospital Course:  87 year old Cantonese speaking male with PMH of dementia (AOx2 @ baseline, ambulates w/ cane or walker), HTN, prior CVA (on Plavix), BPH presented to [place] ED after unwitnessed falls x2 w/ unknown downtime. CTH showed right frontal cortical IPH, transferred to [facility] for further workup. Stability scan completed and bleed remains stable, patient transferred from NSICU to Stroke Telemetry for further workup. MRI completed showing 2.7 cm acute/subacute right frontal lobe hematoma corresponding to CT head. Additional areas of chronic hemorrhage with atrophy may reflect amyloid angiopathy. No acute infarction. Due to evidence of amyloid angiopathy risk of AC outweighs benefit in setting of atrial fibrillation. Discussed with family regarding plan. Structural heart consulted for possible watchmann device, will have outpatient follow up. Course complicated by intermittent sundowning requiring restraints. Started on Seroquel 12.5mg. Pt initially found to have hypertensive episodes >170 systolic despite home dose of hydralazine and metoprolol being given. Lisinopril added and hydralazine uptitrated. However pt found to have underlying pain due to age indeterminate T5 and L5 compression fracture which was likely driving pressure up. Pain addressed with lidocaine patch, acetaminphen and tramadol. Hypotensive episode resulted in D/C of lisinopril and down titration of hydralazine. PT to reevaluate for dispo planning. Pt stepped down from tele stroke regional.       OBJECTIVE:  Vital Signs Last 24 Hrs  T(C): 36.2 (25 Apr 2023 13:20), Max: 36.9 (24 Apr 2023 22:00)  T(F): 97.2 (25 Apr 2023 13:20), Max: 98.5 (24 Apr 2023 22:00)  HR: 67 (25 Apr 2023 14:00) (57 - 80)  BP: 145/68 (25 Apr 2023 14:00) (110/62 - 150/71)  BP(mean): 98 (25 Apr 2023 14:00) (81 - 103)  RR: 17 (25 Apr 2023 14:00) (14 - 20)  SpO2: 99% (25 Apr 2023 14:00) (97% - 99%)    Parameters below as of 25 Apr 2023 14:00  Patient On (Oxygen Delivery Method): room air        Physical Exam:  Gen: NAD, laying in bed, well appearing, alert, interactive  HEENT: PERRL, anicteric sclera, no JVD, no thyromegaly  Cardio: +S1/S2, irregular, no murmurs  Resp: CTA b/l, no w/r/r  GI: +BS x4, NT/ND  Ext: no peripheral edema, NROM x4  Vasc: 3+ peripheral pulses  Skin: warm, dry, and intact. no rashes, wounds or scars  Neuro: AAOx2 (oriented to person and place but not time), face symmetric  -Cranial nerves:   II: hard to assess Visual fields as pt has difficulty following instructions, BTT Intact.  III, IV, VI: Extraocular movements are intact without nystagmus. Pupils equally round and reactive to light.  V:  Facial sensation V1-V3 equal and intact - states he feels being touched.  VII: Face is symmetric with normal eye closure and smile  VIII: Hearing is bilaterally intact to finger rub  IX, X: Uvula is midline and soft palate rises symmetrically  XI: Head turning and shoulder shrug are intact.  XII: Tongue protrudes midline  Motor: Decreased bulk and tone. No pronator drift. Hard to assess strength 2/2 following commands to some extent. B/L UE at least 4/5, antigravity, able to hold for 10seconds, no drift noted.  B/L LE : 4/5 with some bobbing in place (baseline LE weakness per family).  Sensation: Intact to light touch bilaterally. hard to assess for DSST.  Coordination: Hard to assess given pts intentional tremors.  Gait: Deferred.      Labs:                        11.5   6.51  )-----------( 159      ( 25 Apr 2023 05:30 )             34.3     04-25    139  |  104  |  18  ----------------------------<  102<H>  3.8   |  25  |  0.80    Ca    8.6      25 Apr 2023 05:30  Phos  3.0     04-25  Mg     2.0     04-25    TPro  5.8<L>  /  Alb  3.3  /  TBili  0.4  /  DBili  x   /  AST  33  /  ALT  26  /  AlkPhos  49  04-25      Fingerstick  glucose:     MEDICATIONS  (STANDING):  acetaminophen     Tablet .. 650 milliGRAM(s) Oral every 6 hours  donepezil 5 milliGRAM(s) Oral at bedtime  enoxaparin Injectable 40 milliGRAM(s) SubCutaneous every 24 hours  finasteride 5 milliGRAM(s) Oral daily  hydrALAZINE 50 milliGRAM(s) Oral every 8 hours  levETIRAcetam  IVPB 500 milliGRAM(s) IV Intermittent every 12 hours  lidocaine   4% Patch 1 Patch Transdermal every 24 hours  lidocaine   4% Patch 1 Patch Transdermal every 24 hours  metoprolol tartrate 50 milliGRAM(s) Oral every 12 hours  polyethylene glycol 3350 17 Gram(s) Oral daily  QUEtiapine 12.5 milliGRAM(s) Oral daily  senna 2 Tablet(s) Oral at bedtime  tamsulosin 0.4 milliGRAM(s) Oral at bedtime  traMADol 12.5 milliGRAM(s) Oral every 8 hours    MEDICATIONS  (PRN):      Allergies    No Known Allergies    Intolerances        RADIOLOGY & ADDITIONAL TESTS: Reviewed.

## 2023-04-25 NOTE — PROGRESS NOTE ADULT - SUBJECTIVE AND OBJECTIVE BOX
Physical Medicine and Rehabilitation Progress Note :       Patient is a 87y old  Male who presents with a chief complaint of R frontal IPH (25 Apr 2023 09:27)      HPI:  Patient is an 88 y/o Catonese speaking male with PMH of dementia (oriented x 2 at baseline, typically ambulates with cane or walker), HTN, prior CVA- on plavix. Last taken on 4/19. BPH. He presents with altered mental status in the setting of 2 falls. Per ED records, patient's daughters stated that he had recent insomnia. Subsequently, trazodone increased from 50 mg to 100 mg before bedtime 2 days ago. Pt had 2 falls today, both unwitnessed, unknown downtime. Daughter states patient had urinary incontinence. He presented to [place] ED by EMS. 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 He was started on Cardene and given Keppra. Last dose of Plavix was 4/19 and with possible NSTEMI, plavix not reversed. Currently denies chest pain, palpitations shortness of breath. Also had trauma imaging including CT Cspine/ chest/abd/pelvis. Transferred to [facility] for further management. ICH score 1, NIHSS 1. GCS 13. (20 Apr 2023 23:42)                            11.5   6.51  )-----------( 159      ( 25 Apr 2023 05:30 )             34.3       04-25    139  |  104  |  18  ----------------------------<  102<H>  3.8   |  25  |  0.80    Ca    8.6      25 Apr 2023 05:30  Phos  3.0     04-25  Mg     2.0     04-25    TPro  5.8<L>  /  Alb  3.3  /  TBili  0.4  /  DBili  x   /  AST  33  /  ALT  26  /  AlkPhos  49  04-25    Vital Signs Last 24 Hrs  T(C): 36.4 (25 Apr 2023 09:32), Max: 36.9 (24 Apr 2023 14:10)  T(F): 97.6 (25 Apr 2023 09:32), Max: 98.5 (24 Apr 2023 22:00)  HR: 60 (25 Apr 2023 04:14) (57 - 75)  BP: 127/76 (25 Apr 2023 04:14) (122/58 - 148/69)  BP(mean): 97 (25 Apr 2023 04:14) (83 - 103)  RR: 16 (25 Apr 2023 04:14) (14 - 18)  SpO2: 98% (25 Apr 2023 04:14) (98% - 99%)    Parameters below as of 25 Apr 2023 04:14  Patient On (Oxygen Delivery Method): room air        MEDICATIONS  (STANDING):  acetaminophen     Tablet .. 650 milliGRAM(s) Oral every 6 hours  donepezil 5 milliGRAM(s) Oral at bedtime  enoxaparin Injectable 40 milliGRAM(s) SubCutaneous every 24 hours  finasteride 5 milliGRAM(s) Oral daily  hydrALAZINE 50 milliGRAM(s) Oral every 8 hours  levETIRAcetam  IVPB 500 milliGRAM(s) IV Intermittent every 12 hours  lidocaine   4% Patch 1 Patch Transdermal every 24 hours  lidocaine   4% Patch 1 Patch Transdermal every 24 hours  metoprolol tartrate 50 milliGRAM(s) Oral every 12 hours  polyethylene glycol 3350 17 Gram(s) Oral daily  QUEtiapine 12.5 milliGRAM(s) Oral daily  senna 2 Tablet(s) Oral at bedtime  tamsulosin 0.4 milliGRAM(s) Oral at bedtime  traMADol 12.5 milliGRAM(s) Oral every 8 hours    MEDICATIONS  (PRN):      Functional Status Assessment :         Pain Assessment/Number Scale (0-10) Adult  Presence of Pain: denies pain/discomfort (Rating = 0)  Pain Rating (0-10): Rest: 0 (no pain/absence of nonverbal indicators of pain)  Pain Rating (0-10): Activity: 0 (no pain/absence of nonverbal indicators of pain)    Safety      [facility] Functional Assessment: Basic Mobility  Type of Assessment: Daily assessment  Turning from your back to your side while in a flat bed without using bedrails?: 3 = A little assistance  Moving from lying on your back to sitting on the flat side of a flat bed without using bedrails?: 3 = A little assistance  Moving to and from a bed to a chair (including a wheelchair)?: 3 = A little assistance  Standing up from a chair using your arms (e.g. wheelchair or bedside chair)?: 3 = A little assistance  Walking in hospital room?: 3 = A little assistance  Climbing 3-5 steps with a railing?: 3 = A little assistance  Score: 18   Row Comment: Ask the patient "How much help from another person do you currently need? (If the patient hasn't done an activity recently, how much help from another person do you think he/she needs if he/she tried?)    Cognitive/Neuro      Cognitive/Neuro/Behavioral  Cognitive/Neuro/Behavioral [WDL Definition: Alert; opens eyes spontaneously; arouses to voice or touch; oriented x 4; follows commands; speech spontaneous, logical; purposeful motor response; behavior appropriate to situation]: WDL except  Level of Consciousness: alert  Arousal Level: arouses to voice  Orientation: disoriented to;  time  Speech: clear;  spontaneous;  well paced;  logical  Mood/Behavior: calm;  cooperative;  behavior appropriate to situation    Language Assistance  Preferred Language to Address Healthcare Preferred Language to Address Healthcare: Liam    Therapeutic Interventions      Bed Mobility  Bed Mobility Training Rolling/Turning: minimum assist (75% patient effort)  Bed Mobility Training Scooting: minimum assist (75% patient effort)  Bed Mobility Training Supine-to-Sit: minimum assist (75% patient effort)  Bed Mobility Training Limitations: decreased ability to use arms for pushing/pulling;  decreased ability to use legs for bridging/pushing;  impaired ability to control trunk for mobility;  decreased strength;  impaired balance;  impaired postural control    Sit-Stand Transfer Training  Transfer Training Sit-to-Stand Transfer: minimum assist (75% patient effort);  verbal cues;  nonverbal cues (demo/gestures);  rolling walker  Transfer Training Stand-to-Sit Transfer: minimum assist (75% patient effort);  verbal cues;  nonverbal cues (demo/gestures);  rolling walker  Sit-to-Stand Transfer Training Transfer Safety Analysis: decreased weight-shifting ability;  decreased strength;  impaired balance;  impaired postural control;  posteriorly retropulsing however able to correct with verbal cues     Gait Training  Gait Training: minimum assist (75% patient effort);  contact guard;  rolling walker;  25 feet;  x2  Gait Analysis: shuffling gait, decreased rommel, slightly retropulsing, verbal cues to increase step length, mod assist for RW navigation, no c/o dizziness          PM&R Impression : as above    Current Disposition Plan Recommendations :    acute rehab placement .

## 2023-04-25 NOTE — PROGRESS NOTE ADULT - ASSESSMENT
87 year old Cantonese speaking male with PMH of dementia (AOx2 @ baseline, ambulates w/ cane or walker), HTN, prior CVA (on Plavix), BPH presented to [place] ED after unwitnessed falls x2 w/ unknown downtime. CTH showed right frontal cortical IPH, transferred to [facility] for further workup. Stability scan completed and bleed remains stable, patient transferred from NSICU to Stroke Telemetry for further workup. MRI completed.       Neuro     #CVA workup   - continue to hold home Plavix (plan to f/u outpt about resuming Plavix).  - q4hr stroke neuro checks and vitals   - obtain MRI Brain without contrast to assess for underlying etiology of bleed   - HCT Results  4/20: Right frontal acute parenchymal hemorrhage   - HCT Results 4/21: Stable right frontal intraparenchymal hematoma   - CTA Results: Questionable FMD involving the distal right internal carotid artery   - Continue w/Keppra 500 BID X 7 days for seizure prophylaxis (4/21-4/27)  - vEEG D/C'd- Negative   - Pain control w/ Tylenol   - Stroke education        Cards     #HTN   - -160   - Hydralazine 50mg Q8 with parameters to hold if <120 systolic  - BP readings >120 < 150 x 48 hours  - Continues on metoprolol 50mg BID  - TTE with bubble: Normal LV systolic function. No PFO.     #Elevated troponin - resolved  -Troponin downtrending 0.05 > 0.04 > 0.03 , likely demand ischemia, EKG w/ no new ST elevations, pAF and no complaints of chest pain   - CS consulted and states pt will likely need Watchman device in the future as outpatient.   - CS will discuss further with Dr. Chand and patient's family tomorrow.    #Afib  - Known Hx of Afib  - Unclear if pt was on Eliquis prior to 2011(daughters unaware).  - Was started on plavix after extensive discussion w/pts Neuro and cards post cerebellar bleed in 2011.  - Plavix currently held for R Frontal IPH.      #HLD   - LDL results: 89   - Holding statin in setting of IPH    Musculoskeletal  # Back pain  - Age indeterminate T5 and L5 Fx  - Pt consistently reports pain; unable to give pain scale  - Standing Tylenol 650mg PO Q6  - Lidocaine patch X2(upper and lower back)  - Tramadol 25mg given at 2100 on 4/24  - Tramadol 12.5mg Q8 for breakthrough pain    Pulm   - Call provider if SPO2 < 94%   - CT PE protocol completed at [place] negative       GI   #Nutrition/Fluids/Electrolytes    - replete K<4 and Mg <2   - s/p bedside swallow eval with SLP, recommending pureed diet w/ thin liquids- Tolerating well.  - IVF: None  - Downtrending CK    #Gallbladder wall thickening  - Incidental finding in pan scan for Trauma w/u.  - Patient asymptomatic, negative burns's sign  - CT abd/pelvis: Cholelitiasis w/ bladder wall thickening   - abdominal US w/ possible cholecystitis- can f/u outpt w/ GI or Sx for poss HIDA scan i/s/o incidental abd U/S finding. Pt afebrile, LFTs WNL, benign abd exam.  - Lipase/Amylase WNL.    Heme  - Hgb: 12.4   - Iron studies WNL not indicative of iron deficiency anemia  - Total Binding Capacity.: 238 ug/dL  - % Saturation, Iron: 25 %  - Iron Total, Serum: 60 ug/dL  - Unsaturated Iron Binding Capacity: 178 ug/dL         Renal   - Patient with unknown down time, CK elevated concerning rhabdo   - Downtrending CK, IVF D/C'd, encourage PO intake.    Infectious Disease   - Afebrile  - Continue to monitor for leukocytosis    Endocrine   - A1C results: 5.1   - TSH results: 1.76     DVT Prophylaxis   - C/W SQL for DVT PPx  - SCDs for DVT prophylaxis          IDR Goals: Goals reviewed at interdisciplinary rounds with case management, social work, physical therapy, occupational therapy, and speech language pathology.      Please see specific therapy  notes for in depth goals.     Dispo: Acute rehab      Discussed daily hospital plans and goals with patient at bedside        Discussed with Neurology Attending Dr. Bal during rounds. 87 year old Cantonese speaking male with PMH of dementia (AOx2 @ baseline, ambulates w/ cane or walker), HTN, prior CVA (on Plavix), BPH presented to [place] ED after unwitnessed falls x2 w/ unknown downtime. CTH showed right frontal cortical IPH, transferred to [facility] for further workup. Stability scan completed and bleed remains stable, patient transferred from NSICU to Stroke Telemetry for further workup. MRI completed showing 2.7 cm acute/subacute right frontal lobe hematoma corresponding to CT   head. Additional areas of chronic hemorrhage with atrophy may reflect amyloid angiopathy. No acute infarction. Pt to be stepped down from telemetry stroke floor.        Neuro     #CVA workup   - continue to hold home Plavix (plan to f/u outpt about resuming Plavix).  - q4hr stroke neuro checks and vitals   - obtain MRI Brain without contrast to assess for underlying etiology of bleed   - HCT Results  4/20: Right frontal acute parenchymal hemorrhage   - HCT Results 4/21: Stable right frontal intraparenchymal hematoma   - CTA Results: Questionable FMD involving the distal right internal carotid artery   - Continue w/Keppra 500 BID X 7 days for seizure prophylaxis (4/21-4/27)  - vEEG D/C'd- Negative   - Stroke education     #dementia  - pt tugging at bundy line, requiring restraints after 5pm  - concern for sun downing  - Trial of seroquel 12.5mg started daily on 4/25 to be given at 1700   - continue to encourage redirection, reorientation and environment      Cards     #HTN   - -160   - Hydralazine 50mg Q8 with parameters to hold if <120 systolic  - BP readings >120 < 150 x 48 hours  - Continues on metoprolol 50mg BID  - TTE with bubble: Normal LV systolic function. No PFO.     #Elevated troponin - resolved  -Troponin downtrending 0.05 > 0.04 > 0.03 , likely demand ischemia, EKG w/ no new ST elevations, pAF and no complaints of chest pain   - CS consulted and states pt will likely need Watchman device in the future as outpatient.   - CS will discuss further with Dr. Chand and patient's family tomorrow.    #Afib  - Known Hx of Afib  - Unclear if pt was on Eliquis prior to 2011(daughters unaware).  - Was started on plavix after extensive discussion w/pts Neuro and cards post cerebellar bleed in 2011.  - Plavix currently held for R Frontal IPH.  - Risk of starting Plavix out weighs benefits given amyloid angiopathy and areas of chronic hemorrhage       #HLD   - LDL results: 89   - Holding statin in setting of IPH    Musculoskeletal  # Back pain  - Age indeterminate T5 and L5 Fx  - Pt consistently reports pain; unable to give pain scale  - Standing Tylenol 650mg PO Q6  - Lidocaine patch X2(upper and lower back)  - Tramadol 25mg given once at 2100 on 4/24 due to pain  - Tramadol 12.5mg Q8 for breakthrough pain    Pulm   - Call provider if SPO2 < 94%   - CT PE protocol completed at [facility] negative       GI   #Nutrition/Fluids/Electrolytes    - replete K<4 and Mg <2   - s/p bedside swallow eval with SLP, recommending pureed diet w/ thin liquids- Tolerating well.  - IVF: None  - Downtrending CK    #Gallbladder wall thickening  - Incidental finding in pan scan for Trauma w/u.  - Patient asymptomatic, negative burns's sign  - CT abd/pelvis: Cholelitiasis w/ bladder wall thickening   - abdominal US w/ possible cholecystitis- can f/u outpt w/ GI or Sx for poss HIDA scan i/s/o incidental abd U/S finding. Pt afebrile, LFTs WNL, benign abd exam.  - Lipase/Amylase WNL.    Heme  - Hgb: 12.4   - Iron studies WNL not indicative of iron deficiency anemia  - Total Binding Capacity.: 238 ug/dL  - % Saturation, Iron: 25 %  - Iron Total, Serum: 60 ug/dL  - Unsaturated Iron Binding Capacity: 178 ug/dL       Renal   - Patient with unknown down time, CK elevated concerning rhabdo   - Downtrending CK, IVF D/C'd, encourage PO intake.    Infectious Disease   - Afebrile  - Continue to monitor for leukocytosis    Endocrine   - A1C results: 5.1   - TSH results: 1.76     DVT Prophylaxis   - C/W SQL for DVT PPx  - SCDs for DVT prophylaxis          IDR Goals: Goals reviewed at interdisciplinary rounds with case management, social work, physical therapy, occupational therapy, and speech language pathology.      Please see specific therapy  notes for in depth goals.     Dispo: Acute rehab      Discussed daily hospital plans and goals with patient at bedside        Discussed with Neurology Attending Dr. Bal during rounds. 87 year old Cantonese speaking male with PMH of dementia (AOx2 @ baseline, ambulates w/ cane or walker), HTN, prior CVA (on Plavix), BPH presented to [place] ED after unwitnessed falls x2 w/ unknown downtime. CTH showed right frontal cortical IPH, transferred to [facility] for further workup. Stability scan completed and bleed remains stable, patient transferred from NSICU to Stroke Telemetry for further workup. MRI completed showing 2.7 cm acute/subacute right frontal lobe hematoma corresponding to CT   head. Additional areas of chronic hemorrhage with atrophy may reflect amyloid angiopathy. No acute infarction. Pt to be stepped down from telemetry stroke floor.        Neuro     #CVA workup   - continue to hold home Plavix (plan to f/u outpt about resuming Plavix).  - q4hr stroke neuro checks and vitals   - MRI head: 2.7 cm acute/subacute right frontal lobe hematoma corresponding to CT head. Additional areas of chronic hemorrhage with atrophy. These findings may reflect amyloid angiopathy. No acute infarction   - HCT Results  4/20: Right frontal acute parenchymal hemorrhage   - HCT Results 4/21: Stable right frontal intraparenchymal hematoma   - CTA Results: Questionable FMD involving the distal right internal carotid artery   - Continue w/Keppra 500 BID X 7 days for seizure prophylaxis (4/21-4/27)  - vEEG D/C'd- Negative   - Stroke education     #dementia  - pt tugging at bundy line, requiring restraints after 5pm  - concern for sun downing  - Trial of seroquel 12.5mg started daily on 4/25 to be given at 1700   - continue to encourage redirection, reorientation and environment      Cards     #HTN   - -160   - Hydralazine 50mg Q8 with parameters to hold if <120 systolic  - BP readings >120 < 150 x 48 hours  - Continues on metoprolol 50mg BID  - TTE with bubble: Normal LV systolic function. No PFO.     #Elevated troponin - resolved  -Troponin downtrending 0.05 > 0.04 > 0.03 , likely demand ischemia, EKG w/ no new ST elevations, pAF and no complaints of chest pain   - CS consulted and states pt will likely need Watchman device in the future as outpatient.   - CS will discuss further with Dr. Chand and patient's family tomorrow.    #Afib  - Known Hx of Afib  - Unclear if pt was on Eliquis prior to 2011(daughters unaware).  - Was started on plavix after extensive discussion w/pts Neuro and cards post cerebellar bleed in 2011.  - Plavix currently held for R Frontal IPH.  - Risk of starting Plavix out weighs benefits given amyloid angiopathy and areas of chronic hemorrhage       #HLD   - LDL results: 89   - Holding statin in setting of IPH    Musculoskeletal  # Back pain  - Age indeterminate T5 and L5 Fx  - Pt consistently reports pain; unable to give pain scale  - Standing Tylenol 650mg PO Q6  - Lidocaine patch X2(upper and lower back)  - Tramadol 25mg given once at 2100 on 4/24 due to pain  - Tramadol 12.5mg Q8 for breakthrough pain    Pulm   - Call provider if SPO2 < 94%   - CT PE protocol completed at [place] negative       GI   #Nutrition/Fluids/Electrolytes    - replete K<4 and Mg <2   - s/p bedside swallow eval with SLP, recommending pureed diet w/ thin liquids- Tolerating well.  - IVF: None  - Downtrending CK    #Gallbladder wall thickening  - Incidental finding in pan scan for Trauma w/u.  - Patient asymptomatic, negative burns's sign  - CT abd/pelvis: Cholelitiasis w/ bladder wall thickening   - abdominal US w/ possible cholecystitis- can f/u outpt w/ GI or Sx for poss HIDA scan i/s/o incidental abd U/S finding. Pt afebrile, LFTs WNL, benign abd exam.  - Lipase/Amylase WNL.    Heme  - Hgb: 12.4   - Iron studies WNL not indicative of iron deficiency anemia  - Total Binding Capacity.: 238 ug/dL  - % Saturation, Iron: 25 %  - Iron Total, Serum: 60 ug/dL  - Unsaturated Iron Binding Capacity: 178 ug/dL       Renal   - Patient with unknown down time, CK elevated concerning rhabdo   - Downtrending CK, IVF D/C'd, encourage PO intake.    Infectious Disease   - Afebrile  - Continue to monitor for leukocytosis    Endocrine   - A1C results: 5.1   - TSH results: 1.76     DVT Prophylaxis   - C/W SQL for DVT PPx  - SCDs for DVT prophylaxis          IDR Goals: Goals reviewed at interdisciplinary rounds with case management, social work, physical therapy, occupational therapy, and speech language pathology.      Please see specific therapy  notes for in depth goals.     Dispo: Acute rehab      Discussed daily hospital plans and goals with patient at bedside       Discussed with Neurology Attending Dr. Bal during rounds. 87 year old Cantonese speaking male with PMH of dementia (AOx2 @ baseline, ambulates w/ cane or walker), HTN, prior CVA (on Plavix), BPH presented to [place] ED after unwitnessed falls x2 w/ unknown downtime. CTH showed right frontal cortical IPH, transferred to [facility] for further workup. Stability scan completed and bleed remains stable, patient transferred from NSICU to Stroke Telemetry for further workup. MRI completed showing 2.7 cm acute/subacute right frontal lobe hematoma corresponding to CT   head. Additional areas of chronic hemorrhage with atrophy may reflect amyloid angiopathy. No acute infarction. Pt to be stepped down from telemetry stroke floor.        Neuro     #CVA workup   - continue to hold home Plavix (plan to f/u outpt about resuming Plavix).  - q4hr stroke neuro checks and vitals   - MRI head: 2.7 cm acute/subacute right frontal lobe hematoma corresponding to CT head. Additional areas of chronic hemorrhage with atrophy. These findings may reflect amyloid angiopathy. No acute infarction   - HCT Results  4/20: Right frontal acute parenchymal hemorrhage   - HCT Results 4/21: Stable right frontal intraparenchymal hematoma   - CTA Results: Questionable FMD involving the distal right internal carotid artery   - Continue w/Keppra 500 BID X 7 days for seizure prophylaxis (4/21-4/27)  - vEEG D/C'd- Negative   - Stroke education     #dementia  - pt tugging at bundy line, requiring restraints after 5pm  - concern for sun downing  - Trial of seroquel 12.5mg started daily on 4/25 to be given at 1700   - continue to encourage redirection, reorientation and environment      Cards     #HTN   - -160   - Hydralazine 50mg Q8 with parameters to hold if <120 systolic  - BP readings >120 < 150 x 48 hours  - Continues on metoprolol 50mg BID  - TTE with bubble: Normal LV systolic function. No PFO.     #Elevated troponin - resolved  -Troponin downtrending 0.05 > 0.04 > 0.03 , likely demand ischemia, EKG w/ no new ST elevations, pAF and no complaints of chest pain   - CS consulted and states pt will likely need Watchman device in the future as outpatient.   - CS will discuss further with Dr. Chand and patient's family tomorrow.    #Afib  - Known Hx of Afib  - Unclear if pt was on Eliquis prior to 2011(daughters unaware).  - Was started on plavix after extensive discussion w/pts Neuro and cards post cerebellar bleed in 2011.  - Plavix currently held for R Frontal IPH.  - Risk of starting Plavix out weighs benefits given amyloid angiopathy and areas of chronic hemorrhage       #HLD   - LDL results: 89   - Holding statin in setting of IPH    Musculoskeletal  # Back pain  - Age indeterminate T5 and L5 Fx  - Pt consistently reports pain; unable to give pain scale  - Standing Tylenol 650mg PO Q6  - Lidocaine patch X2(upper and lower back)  - Tramadol 25mg given once at 2100 on 4/24 due to pain  - Tramadol 12.5mg Q8 for breakthrough pain    Pulm   - Call provider if SPO2 < 94%   - CT PE protocol completed at [place] negative       GI   #Nutrition/Fluids/Electrolytes    - replete K<4 and Mg <2   - s/p bedside swallow eval with SLP, recommending pureed diet w/ thin liquids- Tolerating well.  - IVF: None  - Downtrending CK    #Gallbladder wall thickening  - Incidental finding in pan scan for Trauma w/u.  - Patient asymptomatic, negative burns's sign  - CT abd/pelvis: Cholelitiasis w/ bladder wall thickening   - abdominal US w/ possible cholecystitis- can f/u outpt w/ GI or Sx for poss HIDA scan i/s/o incidental abd U/S finding. Pt afebrile, LFTs WNL, benign abd exam.  - Lipase/Amylase WNL.    Heme  - Hgb: 12.4   - Iron studies WNL not indicative of iron deficiency anemia  - Total Binding Capacity.: 238 ug/dL  - % Saturation, Iron: 25 %  - Iron Total, Serum: 60 ug/dL  - Unsaturated Iron Binding Capacity: 178 ug/dL       Renal   - Patient with unknown down time, CK elevated concerning rhabdo   - Downtrending CK, IVF D/C'd, encourage PO intake.    Infectious Disease   - Afebrile  - Continue to monitor for leukocytosis    Endocrine   - A1C results: 5.1   - TSH results: 1.76     DVT Prophylaxis   - C/W SQL for DVT PPx  - SCDs for DVT prophylaxis          IDR Goals: Goals reviewed at interdisciplinary rounds with case management, social work, physical therapy, occupational therapy, and speech language pathology.      Please see specific therapy  notes for in depth goals.     Dispo: Acute rehab      Discussed daily hospital plans and goals with patient at bedside. Pt daughters were called and updates discussed.        Discussed with Neurology Attending Dr. Bal during rounds. 87 year old Cantonese speaking male with PMH of dementia (AOx2 @ baseline, ambulates w/ cane or walker), HTN, prior CVA (on Plavix), BPH presented to [place] ED after unwitnessed falls x2 w/ unknown downtime. CTH showed right frontal cortical IPH, transferred to [facility] for further workup. Stability scan completed and bleed remains stable, patient transferred from NSICU to Stroke Telemetry for further workup. MRI completed showing 2.7 cm acute/subacute right frontal lobe hematoma corresponding to CT   head. Additional areas of chronic hemorrhage with atrophy may reflect amyloid angiopathy. No acute infarction. Pt to be stepped down from telemetry stroke floor.      Neuro     #CVA workup   - continue to hold home Plavix (plan to f/u outpt about resuming Plavix).  - q4hr stroke neuro checks and vitals   - MRI head: 2.7 cm acute/subacute right frontal lobe hematoma corresponding to CT head. Additional areas of chronic hemorrhage with atrophy. These findings may reflect amyloid angiopathy. No acute infarction   - HCT Results  4/20: Right frontal acute parenchymal hemorrhage   - HCT Results 4/21: Stable right frontal intraparenchymal hematoma   - CTA Results: Questionable FMD involving the distal right internal carotid artery   - Continue w/Keppra 500 BID X 7 days for seizure prophylaxis (4/21-4/27)  - vEEG D/C'd- Negative   - Stroke education     #dementia  - pt tugging at bundy line, requiring restraints after 5pm  - concern for sun downing  - Trial of seroquel 12.5mg started daily on 4/25 to be given at 1700   - continue to encourage redirection, reorientation and environment      Cards     #HTN   - -160   - Hydralazine 50mg Q8 with parameters to hold if <120 systolic  - BP readings >120 < 150 x 48 hours  - Continues on metoprolol 50mg BID  - TTE with bubble: Normal LV systolic function. No PFO.     #Elevated troponin - resolved  -Troponin downtrending 0.05 > 0.04 > 0.03 , likely demand ischemia, EKG w/ no new ST elevations, pAF and no complaints of chest pain   - CS consulted and states pt will likely need Watchman device in the future as outpatient.   - CS will discuss further with Dr. Chand and patient's family tomorrow.    #Afib  - Known Hx of Afib  - Unclear if pt was on Eliquis prior to 2011(daughters unaware).  - Was started on plavix after extensive discussion w/pts Neuro and cards post cerebellar bleed in 2011.  - Plavix currently held for R Frontal IPH.  - Risk of starting Plavix out weighs benefits given amyloid angiopathy and areas of chronic hemorrhage       #HLD   - LDL results: 89   - Holding statin in setting of IPH    Musculoskeletal  # Back pain  - Age indeterminate T5 and L5 Fx  - Pt consistently reports pain; unable to give pain scale  - Standing Tylenol 650mg PO Q6  - Lidocaine patch X2(upper and lower back)  - Tramadol 25mg given once at 2100 on 4/24 due to pain  - Tramadol 12.5mg Q8 for breakthrough pain    Pulm   - Call provider if SPO2 < 94%   - CT PE protocol completed at [place] negative       GI   #Nutrition/Fluids/Electrolytes    - replete K<4 and Mg <2   - s/p bedside swallow eval with SLP, recommending pureed diet w/ thin liquids- Tolerating well.  - IVF: None  - Downtrending CK    #Gallbladder wall thickening  - Incidental finding in pan scan for Trauma w/u.  - Patient asymptomatic, negative burns's sign  - CT abd/pelvis: Cholelitiasis w/ bladder wall thickening   - abdominal US w/ possible cholecystitis- can f/u outpt w/ GI or Sx for poss HIDA scan i/s/o incidental abd U/S finding. Pt afebrile, LFTs WNL, benign abd exam.  - Lipase/Amylase WNL.    Heme  - Hgb: 12.4   - Iron studies WNL not indicative of iron deficiency anemia  - Total Binding Capacity.: 238 ug/dL  - % Saturation, Iron: 25 %  - Iron Total, Serum: 60 ug/dL  - Unsaturated Iron Binding Capacity: 178 ug/dL       Renal   - Patient with unknown down time, CK elevated concerning rhabdo   - Downtrending CK, IVF D/C'd, encourage PO intake.    Infectious Disease   - Afebrile  - Continue to monitor for leukocytosis    Endocrine   - A1C results: 5.1   - TSH results: 1.76     DVT Prophylaxis   - C/W SQL for DVT PPx  - SCDs for DVT prophylaxis          IDR Goals: Goals reviewed at interdisciplinary rounds with case management, social work, physical therapy, occupational therapy, and speech language pathology.      Please see specific therapy  notes for in depth goals.     Dispo: Acute rehab      Discussed daily hospital plans and goals with patient at bedside. Pt daughters were called and updates discussed.        Discussed with Neurology Attending Dr. Bal during rounds.

## 2023-04-25 NOTE — PROGRESS NOTE ADULT - ASSESSMENT
87 year old Cantonese speaking male with PMH of dementia (AOx2 @ baseline, ambulates w/ cane or walker), HTN, prior CVA (on Plavix), BPH presented to [place] ED after unwitnessed falls x2 w/ unknown downtime. Found to have right frontal cortical IPH on CTH and transferred to [facility] for further workup.

## 2023-04-25 NOTE — CHART NOTE - NSCHARTNOTEFT_GEN_A_CORE
Patient seen and assessed at bedside today while receiving physical therapy who are recommending Acute rehab. Patient was very unsteady on his feet and a high risk for repeat falls. Given the need for ASA and Plavix short term after watchmen device placement, patient is likely not a candidate at this time. Structural heart to sign off at this time.     Toña Chou PA-C

## 2023-04-25 NOTE — PROGRESS NOTE ADULT - SUBJECTIVE AND OBJECTIVE BOX
Neurology Stroke Progress Note    INTERVAL HPI/OVERNIGHT EVENTS:  Patient seen and examined sitting comfortably in chair.  number [id number] used. Pt responds appropriately responds to most questions asked via the  however noted waxing and waning of mental status. Alert, awake, oriented X 2. Pt not in any acute distress however when asked if he has pain, he states in his back. Pt has an age indeterminate T5 and L5 Fx. Pt receiving lidocaine patch, tylenol 560 Q6 and 12.5mg tramadol added for breakthrough pain starting today. Denies any headache, chest pain or abdominal pain. Pt tolerating metoprolol 50mg BID and hydralazine 50mg to be given Q8 if systolic BP is >120. Pt observed to be tugging on bundy, requiring mitten restraints starting around 5pm, concern for sundowning. Seroquel 12.5mg daily to be trialed starting today.       MEDICATIONS  (STANDING):  acetaminophen     Tablet .. 650 milliGRAM(s) Oral every 6 hours  donepezil 5 milliGRAM(s) Oral at bedtime  enoxaparin Injectable 40 milliGRAM(s) SubCutaneous every 24 hours  finasteride 5 milliGRAM(s) Oral daily  hydrALAZINE 50 milliGRAM(s) Oral every 8 hours  levETIRAcetam  IVPB 500 milliGRAM(s) IV Intermittent every 12 hours  lidocaine   4% Patch 1 Patch Transdermal every 24 hours  lidocaine   4% Patch 1 Patch Transdermal every 24 hours  metoprolol tartrate 50 milliGRAM(s) Oral every 12 hours  polyethylene glycol 3350 17 Gram(s) Oral daily  QUEtiapine 12.5 milliGRAM(s) Oral daily  senna 2 Tablet(s) Oral at bedtime  tamsulosin 0.4 milliGRAM(s) Oral at bedtime  traMADol 12.5 milliGRAM(s) Oral every 8 hours    MEDICATIONS  (PRN):      Allergies    No Known Allergies    Intolerances        Vital Signs Last 24 Hrs  T(C): 36.4 (25 Apr 2023 09:32), Max: 36.9 (24 Apr 2023 14:10)  T(F): 97.6 (25 Apr 2023 09:32), Max: 98.5 (24 Apr 2023 22:00)  HR: 60 (25 Apr 2023 04:14) (57 - 75)  BP: 127/76 (25 Apr 2023 04:14) (122/58 - 148/69)  BP(mean): 97 (25 Apr 2023 04:14) (83 - 103)  RR: 16 (25 Apr 2023 04:14) (14 - 18)  SpO2: 98% (25 Apr 2023 04:14) (98% - 99%)    Parameters below as of 25 Apr 2023 04:14  Patient On (Oxygen Delivery Method): room air      Physical exam:  General: No acute distress, awake and alert. Oriented to person and place but not time.   Eyes: Anicteric sclerae, moist conjunctivae, see below for CNs  Neck: trachea midline, FROM  Cardiovascular: Irregular (noted to have Afib at times)  Pulmonary: Anterior breath sounds clear bilaterally, No use of accessory muscles.  GI: Abdomen soft, non-distended, non-tender      Neurologic:    Neurologic:  -Mental status: Awake, alert, oriented to person(self, able to state his name and age, unable to state birth year), place (states as hospital), but not to time. Waxing and waning mental status. Speech is fluent with intact naming, repetition, and comprehension, no dysarthria. Decreased attention span and sometimes require repetition of instructions. Follows some simple commands      -Cranial nerves:   II: hard to assess Visual fields as pt has difficulty following instructions, BTT Intact.  III, IV, VI: Extraocular movements are intact without nystagmus. Pupils equally round and reactive to light.  V:  Facial sensation V1-V3 equal and intact - states he feels being touched.  VII: Face is symmetric with normal eye closure and smile  VIII: Hearing is bilaterally intact to finger rub  IX, X: Uvula is midline and soft palate rises symmetrically  XI: Head turning and shoulder shrug are intact.  XII: Tongue protrudes midline  Motor: Decreased bulk and tone. No pronator drift. Hard to assess strength 2/2 following commands to some extent. B/L UE atleast 4/5, antigravity, able to hold for 10seconds, no drift noted.  B/L LE : 4/5 with some bobbing in place (baseline LE weakness per family).  Sensation: Intact to light touch bilaterally. hard to assess for DSST.  Coordination: Hard to assess given pts intentional tremors.  Gait: Deferred.          LABS:                        11.5   6.51  )-----------( 159      ( 25 Apr 2023 05:30 )             34.3     04-25    139  |  104  |  18  ----------------------------<  102<H>  3.8   |  25  |  0.80    Ca    8.6      25 Apr 2023 05:30  Phos  3.0     04-25  Mg     2.0     04-25    TPro  5.8<L>  /  Alb  3.3  /  TBili  0.4  /  DBili  x   /  AST  33  /  ALT  26  /  AlkPhos  49  04-25          RADIOLOGY & ADDITIONAL TESTS:      CT Head No Cont (04.21.23 @ 01:35)   Impression:    Stable right frontal intraparenchymal hematoma.      MR Head No Cont (04.24.23 @ 23:54)  IMPRESSION:    2.7 cm acute/subacute right frontal lobe hematoma corresponding to CT   head. Additional areas of chronic hemorrhage with atrophy. These findings   may reflect amyloid angiopathy. No acute infarction   **STEPDOWN NOTE FROM STROKE TELE TO REGIONAL**    87 year old Cantonese speaking male with PMH of dementia (AOx2 @ baseline, ambulates w/ cane or walker), HTN, prior CVA (on Plavix), BPH presented to [place] ED after unwitnessed falls x2 w/ unknown downtime. CTH showed right frontal cortical IPH, transferred to [facility] for further workup. Stability scan completed and bleed remains stable, patient transferred from NSICU to Stroke Telemetry for further workup. MRI completed showing 2.7 cm acute/subacute right frontal lobe hematoma corresponding to CT   head. Additional areas of chronic hemorrhage with atrophy may reflect amyloid angiopathy. No acute infarction. Due to evidence of amyloid angiopathy risk of AC outweighs benefit in setting of atrial fibrillation. Discussed with family regarding plan. Structural heart consulted for possible watchmann device, will have outpatient follow up. Course completed by intermittent sundowning requiring restraints. Will trial Seroquel 12.5mg today and will reassess. Pt initially found to have hypertensive episodes >170 systolic despite home dose of hydralazine and metoprolol being given. Lisinopril added and hydralazine uptitrated. However pt found to have underlying pain due to age indeterminate T5 and L5 compression fracture which was likely driving pressure up. Pain addressed with lidocaine patch, acetaminphen and tramadol. Hypotensive episode resulted in D/C of lisinopril and down titration of hydralazine. PT to reevaluate for dispo planning. Pt to be stepped down from tele stroke regional.       Neurology Stroke Progress Note    INTERVAL HPI/OVERNIGHT EVENTS:  Patient seen and examined sitting comfortably in chair.  number [id number] used. Pt responds appropriately responds to most questions asked via the  however noted waxing and waning of mental status. Alert, awake, oriented X 2. Pt not in any acute distress however when asked if he has pain, he states in his back. Pt has an age indeterminate T5 and L5 Fx. Pt receiving lidocaine patch, tylenol 560 Q6 and 12.5mg tramadol added for breakthrough pain starting today. Denies any headache, chest pain or abdominal pain. Pt tolerating metoprolol 50mg BID and hydralazine 50mg to be given Q8 if systolic BP is >120. Pt observed to be tugging on bundy, requiring mitten restraints starting around 5pm, concern for sundowning. Seroquel 12.5mg daily to be trialed starting today.       MEDICATIONS  (STANDING):  acetaminophen     Tablet .. 650 milliGRAM(s) Oral every 6 hours  donepezil 5 milliGRAM(s) Oral at bedtime  enoxaparin Injectable 40 milliGRAM(s) SubCutaneous every 24 hours  finasteride 5 milliGRAM(s) Oral daily  hydrALAZINE 50 milliGRAM(s) Oral every 8 hours  levETIRAcetam  IVPB 500 milliGRAM(s) IV Intermittent every 12 hours  lidocaine   4% Patch 1 Patch Transdermal every 24 hours  lidocaine   4% Patch 1 Patch Transdermal every 24 hours  metoprolol tartrate 50 milliGRAM(s) Oral every 12 hours  polyethylene glycol 3350 17 Gram(s) Oral daily  QUEtiapine 12.5 milliGRAM(s) Oral daily  senna 2 Tablet(s) Oral at bedtime  tamsulosin 0.4 milliGRAM(s) Oral at bedtime  traMADol 12.5 milliGRAM(s) Oral every 8 hours    MEDICATIONS  (PRN):      Allergies    No Known Allergies    Intolerances        Vital Signs Last 24 Hrs  T(C): 36.4 (25 Apr 2023 09:32), Max: 36.9 (24 Apr 2023 14:10)  T(F): 97.6 (25 Apr 2023 09:32), Max: 98.5 (24 Apr 2023 22:00)  HR: 60 (25 Apr 2023 04:14) (57 - 75)  BP: 127/76 (25 Apr 2023 04:14) (122/58 - 148/69)  BP(mean): 97 (25 Apr 2023 04:14) (83 - 103)  RR: 16 (25 Apr 2023 04:14) (14 - 18)  SpO2: 98% (25 Apr 2023 04:14) (98% - 99%)    Parameters below as of 25 Apr 2023 04:14  Patient On (Oxygen Delivery Method): room air      Physical exam:  General: No acute distress, awake and alert. Oriented to person and place but not time.   Eyes: Anicteric sclerae, moist conjunctivae, see below for CNs  Neck: trachea midline, FROM  Cardiovascular: Irregular (noted to have Afib at times)  Pulmonary: Anterior breath sounds clear bilaterally, No use of accessory muscles.  GI: Abdomen soft, non-distended, non-tender      Neurologic:    Neurologic:  -Mental status: Awake, alert, oriented to person(self, able to state his name and age, unable to state birth year), place (states as hospital), but not to time. Waxing and waning mental status. Speech is fluent with intact naming, repetition, and comprehension, no dysarthria. Decreased attention span and sometimes require repetition of instructions. Follows some simple commands      -Cranial nerves:   II: hard to assess Visual fields as pt has difficulty following instructions, BTT Intact.  III, IV, VI: Extraocular movements are intact without nystagmus. Pupils equally round and reactive to light.  V:  Facial sensation V1-V3 equal and intact - states he feels being touched.  VII: Face is symmetric with normal eye closure and smile  VIII: Hearing is bilaterally intact to finger rub  IX, X: Uvula is midline and soft palate rises symmetrically  XI: Head turning and shoulder shrug are intact.  XII: Tongue protrudes midline  Motor: Decreased bulk and tone. No pronator drift. Hard to assess strength 2/2 following commands to some extent. B/L UE atleast 4/5, antigravity, able to hold for 10seconds, no drift noted.  B/L LE : 4/5 with some bobbing in place (baseline LE weakness per family).  Sensation: Intact to light touch bilaterally. hard to assess for DSST.  Coordination: Hard to assess given pts intentional tremors.  Gait: Deferred.          LABS:                        11.5   6.51  )-----------( 159      ( 25 Apr 2023 05:30 )             34.3     04-25    139  |  104  |  18  ----------------------------<  102<H>  3.8   |  25  |  0.80    Ca    8.6      25 Apr 2023 05:30  Phos  3.0     04-25  Mg     2.0     04-25    TPro  5.8<L>  /  Alb  3.3  /  TBili  0.4  /  DBili  x   /  AST  33  /  ALT  26  /  AlkPhos  49  04-25          RADIOLOGY & ADDITIONAL TESTS:      CT Head No Cont (04.21.23 @ 01:35)   Impression:    Stable right frontal intraparenchymal hematoma.      MR Head No Cont (04.24.23 @ 23:54)  IMPRESSION:    2.7 cm acute/subacute right frontal lobe hematoma corresponding to CT   head. Additional areas of chronic hemorrhage with atrophy. These findings   may reflect amyloid angiopathy. No acute infarction   **STEPDOWN NOTE FROM STROKE TELE TO REGIONAL**    87 year old Cantonese speaking male with PMH of dementia (AOx2 @ baseline, ambulates w/ cane or walker), Afib, HTN, prior CVA (on Plavix), BPH presented to [place] ED after unwitnessed falls x2 w/ unknown downtime. CTH showed right frontal cortical IPH, transferred to [facility] and admitted under neurosurgery for further workup. Stability scan completed and bleed remains stable, patient transferred from NSICU to Stroke Telemetry for further workup. MRI completed showing 2.7 cm acute/subacute right frontal lobe hematoma corresponding to CT head. Additional areas of chronic hemorrhage with atrophy may reflect amyloid angiopathy. No acute infarction. Due to evidence of amyloid angiopathy risk of AC outweighs benefit in setting of atrial fibrillation. Discussed with family regarding plan. Structural heart consulted for possible watchmann procedure, will have outpatient follow up. Course completed by intermittent sundowning requiring restraints. Will trial Seroquel 12.5mg today and will reassess. Pt initially found to have hypertensive episodes >170 systolic despite home dose of hydralazine and metoprolol being given. Lisinopril added and hydralazine uptitrated. However pt found to have underlying pain due to age indeterminate T5 and L5 compression fracture which was likely driving pressure up. Pain addressed with lidocaine patch, acetaminphen and tramadol. Hypotensive episode resulted in D/C of lisinopril and down titration of hydralazine. PT to reevaluate for dispo planning. Pt to be stepped down from tele stroke regional.       Neurology Stroke Progress Note    INTERVAL HPI/OVERNIGHT EVENTS:  Patient seen and examined sitting comfortably in chair.  number [id number] used. Pt responds appropriately responds to most questions asked via the  however noted waxing and waning of mental status. Alert, awake, oriented X 2. Pt not in any acute distress however when asked if he has pain, he states in his back. Pt has an age indeterminate T5 and L5 Fx. Pt receiving lidocaine patch, tylenol 560 Q6 and 12.5mg tramadol added for breakthrough pain starting today. Denies any headache, chest pain or abdominal pain. Pt tolerating metoprolol 50mg BID and hydralazine 50mg to be given Q8 if systolic BP is >120. Pt observed to be tugging on bundy, requiring mitten restraints starting around 5pm, concern for sundowning. Seroquel 12.5mg daily to be trialed starting today.       MEDICATIONS  (STANDING):  acetaminophen     Tablet .. 650 milliGRAM(s) Oral every 6 hours  donepezil 5 milliGRAM(s) Oral at bedtime  enoxaparin Injectable 40 milliGRAM(s) SubCutaneous every 24 hours  finasteride 5 milliGRAM(s) Oral daily  hydrALAZINE 50 milliGRAM(s) Oral every 8 hours  levETIRAcetam  IVPB 500 milliGRAM(s) IV Intermittent every 12 hours  lidocaine   4% Patch 1 Patch Transdermal every 24 hours  lidocaine   4% Patch 1 Patch Transdermal every 24 hours  metoprolol tartrate 50 milliGRAM(s) Oral every 12 hours  polyethylene glycol 3350 17 Gram(s) Oral daily  QUEtiapine 12.5 milliGRAM(s) Oral daily  senna 2 Tablet(s) Oral at bedtime  tamsulosin 0.4 milliGRAM(s) Oral at bedtime  traMADol 12.5 milliGRAM(s) Oral every 8 hours    MEDICATIONS  (PRN):      Allergies    No Known Allergies    Intolerances        Vital Signs Last 24 Hrs  T(C): 36.4 (25 Apr 2023 09:32), Max: 36.9 (24 Apr 2023 14:10)  T(F): 97.6 (25 Apr 2023 09:32), Max: 98.5 (24 Apr 2023 22:00)  HR: 60 (25 Apr 2023 04:14) (57 - 75)  BP: 127/76 (25 Apr 2023 04:14) (122/58 - 148/69)  BP(mean): 97 (25 Apr 2023 04:14) (83 - 103)  RR: 16 (25 Apr 2023 04:14) (14 - 18)  SpO2: 98% (25 Apr 2023 04:14) (98% - 99%)    Parameters below as of 25 Apr 2023 04:14  Patient On (Oxygen Delivery Method): room air      Physical exam:  General: No acute distress, awake and alert. Oriented to person and place but not time.   Eyes: Anicteric sclerae, moist conjunctivae, see below for CNs  Neck: trachea midline, FROM  Cardiovascular: Irregular (noted to have Afib at times)  Pulmonary: Anterior breath sounds clear bilaterally, No use of accessory muscles.  GI: Abdomen soft, non-distended, non-tender      Neurologic:    Neurologic:  -Mental status: Awake, alert, oriented to person(self, able to state his name and age, unable to state birth year), place (states as hospital), but not to time. Waxing and waning mental status. Speech is fluent with intact naming, repetition, and comprehension, no dysarthria. Decreased attention span and sometimes require repetition of instructions. Follows some simple commands      -Cranial nerves:   II: hard to assess Visual fields as pt has difficulty following instructions, BTT Intact.  III, IV, VI: Extraocular movements are intact without nystagmus. Pupils equally round and reactive to light.  V:  Facial sensation V1-V3 equal and intact - states he feels being touched.  VII: Face is symmetric with normal eye closure and smile  VIII: Hearing is bilaterally intact to finger rub  IX, X: Uvula is midline and soft palate rises symmetrically  XI: Head turning and shoulder shrug are intact.  XII: Tongue protrudes midline  Motor: Decreased bulk and tone. No pronator drift. Hard to assess strength 2/2 following commands to some extent. B/L UE atleast 4/5, antigravity, able to hold for 10seconds, no drift noted.  B/L LE : 4/5 with some bobbing in place (baseline LE weakness per family).  Sensation: Intact to light touch bilaterally. hard to assess for DSST.  Coordination: Hard to assess given pts intentional tremors.  Gait: Deferred.          LABS:                        11.5   6.51  )-----------( 159      ( 25 Apr 2023 05:30 )             34.3     04-25    139  |  104  |  18  ----------------------------<  102<H>  3.8   |  25  |  0.80    Ca    8.6      25 Apr 2023 05:30  Phos  3.0     04-25  Mg     2.0     04-25    TPro  5.8<L>  /  Alb  3.3  /  TBili  0.4  /  DBili  x   /  AST  33  /  ALT  26  /  AlkPhos  49  04-25          RADIOLOGY & ADDITIONAL TESTS:      CT Head No Cont (04.21.23 @ 01:35)   Impression:    Stable right frontal intraparenchymal hematoma.      MR Head No Cont (04.24.23 @ 23:54)  IMPRESSION:    2.7 cm acute/subacute right frontal lobe hematoma corresponding to CT   head. Additional areas of chronic hemorrhage with atrophy. These findings   may reflect amyloid angiopathy. No acute infarction

## 2023-04-25 NOTE — PROGRESS NOTE ADULT - ASSESSMENT
87M Cantonese speaker w HTN, AFib not on AC, BPH, Mild dementia, prior CVA on plavix, p/w unwitnessed fall and unknown downtime, initially found at [facility] ED to have Frontal Cortical IPH, transferred to [facility] for further mgmt, now transferred to stroke service    #R frontal intraparenchymal hematoma  A1c 5.1; LDL 89; TSH 1.76  TTE: Nml LVEF. UA, UCX, BCx NGTD. BLE Doppler negative for DVT   - on keppra 500 IV q12    #HTN - -140. Home on toprol 50, hydralazine 25 TID   - on lopressor 50 BID and hydralazine 50 q8h   #AFib - Home on toprol 50. Holding AC d/t intraparenchymal hematoma  [id number] score 3 = 3.2% risk of stroke per year  HasBled - 2 = 4.1% risk of major bleeding. In additional, pt has known h/o falls  MR brain redemonstrates frontal cortical hematoma    #T5-L5 Compression fracture - home on tylenol   - on standing tylenol and daily lidocaine patches   - PRN Tramadol  #Osteoporosis - Vit 25,OH-D at target. nml PTH.     #Rhabdomyolysis - improving. CK 1100 4/24. Peaked at 1500 4/21  #Cholelithiasis - asymptomatic  #Mild dementia - baseline reported to be a/o x2 - c/w donepezil 5 HS  #BPH - on flomax and finasteride    Recommend  Agree w dosing seroquel 12.5 q24h - 1700h  Noted structural heart consult for Watchman procedure. Pt does have indication for AC due to [id number] score 3. However he also does have risk factors including IPH, fall risk, and dementia. Will need to weigh risk-benefit of stroke prevention and major bleeding in discussion with family prior to initiating anticoagulation.     Optimize pain control - need to monitor pain control especially during PT, Mobilization, Transfers  Pt at high risk for delirium. Please optimize pain control, use caution with sedatives, anticholinergics. If agitated, would use redirection, re-orientation and avoid restraints as this will increase risk of and can worsen delirium    PPx: SQL    DISPO: [email or web address]

## 2023-04-25 NOTE — PROGRESS NOTE ADULT - SUBJECTIVE AND OBJECTIVE BOX
INTERVAL HPI/OVERNIGHT EVENTS: doyle o/n    SUBJECTIVE: Patient seen and examined at bedside.   Cantonese  - video used  Pt feels well. Reports pain in lower back - approximately 3/10. Was able to sit up in chair earlier today. Denies any issues eating lunch - no nausea, abd pain, chest pain, dyspnea.       OBJECTIVE:    VITAL SIGNS:  ICU Vital Signs Last 24 Hrs  T(C): 36.2 (25 Apr 2023 13:20), Max: 36.9 (24 Apr 2023 22:00)  T(F): 97.2 (25 Apr 2023 13:20), Max: 98.5 (24 Apr 2023 22:00)  HR: 67 (25 Apr 2023 14:00) (57 - 80)  BP: 145/68 (25 Apr 2023 14:00) (110/62 - 150/71)  BP(mean): 98 (25 Apr 2023 14:00) (81 - 103)  ABP: --  ABP(mean): --  RR: 17 (25 Apr 2023 14:00) (14 - 20)  SpO2: 99% (25 Apr 2023 14:00) (97% - 99%)    O2 Parameters below as of 25 Apr 2023 14:00  Patient On (Oxygen Delivery Method): room air              04-24 @ 07:01  -  04-25 @ 07:00  --------------------------------------------------------  IN: 960 mL / OUT: 450 mL / NET: 510 mL    04-25 @ 07:01  -  04-25 @ 16:21  --------------------------------------------------------  IN: 480 mL / OUT: 300 mL / NET: 180 mL      CAPILLARY BLOOD GLUCOSE          PHYSICAL EXAM:  GEN: Male in NAD on RA  HEENT: NC/AT, MMM  CV: RRR, nml S1S2, no murmurs  PULM: nml effort, CTAB  ABD: Soft, non-distended, NABS, non-tender  NEURO  A/O to self, hospital (states he is in [place])  5/5 in BUE and BLE.   Sensation intact  PSYCH: Appropriate    MEDICATIONS:  MEDICATIONS  (STANDING):  acetaminophen     Tablet .. 650 milliGRAM(s) Oral every 6 hours  donepezil 5 milliGRAM(s) Oral at bedtime  enoxaparin Injectable 40 milliGRAM(s) SubCutaneous every 24 hours  finasteride 5 milliGRAM(s) Oral daily  hydrALAZINE 50 milliGRAM(s) Oral every 8 hours  levETIRAcetam  IVPB 500 milliGRAM(s) IV Intermittent every 12 hours  lidocaine   4% Patch 1 Patch Transdermal every 24 hours  lidocaine   4% Patch 1 Patch Transdermal every 24 hours  metoprolol tartrate 50 milliGRAM(s) Oral every 12 hours  polyethylene glycol 3350 17 Gram(s) Oral daily  QUEtiapine 12.5 milliGRAM(s) Oral daily  senna 2 Tablet(s) Oral at bedtime  tamsulosin 0.4 milliGRAM(s) Oral at bedtime  traMADol 12.5 milliGRAM(s) Oral every 8 hours    MEDICATIONS  (PRN):      ALLERGIES:  Allergies    No Known Allergies    Intolerances        LABS:                        11.5   6.51  )-----------( 159      ( 25 Apr 2023 05:30 )             34.3     04-25    139  |  104  |  18  ----------------------------<  102<H>  3.8   |  25  |  0.80    Ca    8.6      25 Apr 2023 05:30  Phos  3.0     04-25  Mg     2.0     04-25    TPro  5.8<L>  /  Alb  3.3  /  TBili  0.4  /  DBili  x   /  AST  33  /  ALT  26  /  AlkPhos  49  04-25          RADIOLOGY & ADDITIONAL TESTS: Reviewed.

## 2023-04-25 NOTE — PROGRESS NOTE ADULT - ASSESSMENT
Neurology    87 year old Cantonese speaking male with PMH of dementia (AOx2 @ baseline, ambulates w/ cane or walker), HTN, prior CVA (on Plavix), BPH presented to [place] ED after unwitnessed falls x2 w/ unknown downtime. CTH showed right frontal cortical IPH, transferred to [facility] for further workup. Stability scan completed and bleed remains stable, patient transferred from NSICU to Stroke Telemetry for further workup. MRI completed showing 2.7 cm acute/subacute right frontal lobe hematoma corresponding to CT   head. Additional areas of chronic hemorrhage with atrophy may reflect amyloid angiopathy. No acute infarction. Pt to be stepped down from telemetry stroke floor.    Neuro     #CVA workup   - continue to hold home Plavix (plan to f/u outpt about resuming Plavix).  - q4hr stroke neuro checks and vitals   - MRI head: 2.7 cm acute/subacute right frontal lobe hematoma corresponding to CT head. Additional areas of chronic hemorrhage with atrophy. These findings may reflect amyloid angiopathy. No acute infarction   - HCT Results  4/20: Right frontal acute parenchymal hemorrhage   - HCT Results 4/21: Stable right frontal intraparenchymal hematoma   - CTA Results: Questionable FMD involving the distal right internal carotid artery   - Continue w/Keppra 500 BID X 7 days for seizure prophylaxis (4/21-4/27)  - vEEG D/C'd- Negative   - Stroke education     #dementia  - pt tugging at bundy line, requiring restraints after 5pm  - concern for sun downing  - Trial of seroquel 12.5mg started daily on 4/25 to be given at 1700   - continue to encourage redirection, reorientation and environment      Cards     #HTN   - -160   - Hydralazine 50mg Q8 with parameters to hold if <120 systolic  - BP readings >120 < 150 x 48 hours  - Continues on metoprolol 50mg BID  - TTE with bubble: Normal LV systolic function. No PFO.     #Elevated troponin - resolved  -Troponin downtrending 0.05 > 0.04 > 0.03 , likely demand ischemia, EKG w/ no new ST elevations, pAF and no complaints of chest pain   - CS consulted and states pt will likely need Watchman device in the future as outpatient.   - CS will discuss further with Dr. Chand and patient's family tomorrow.    #Afib  - Known Hx of Afib  - Unclear if pt was on Eliquis prior to 2011(daughters unaware).  - Was started on plavix after extensive discussion w/pts Neuro and cards post cerebellar bleed in 2011.  - Plavix currently held for R Frontal IPH.  - Risk of starting Plavix out weighs benefits given amyloid angiopathy and areas of chronic hemorrhage       #HLD   - LDL results: 89   - Holding statin in setting of IPH    Musculoskeletal  # Back pain  - Age indeterminate T5 and L5 Fx  - Pt consistently reports pain; unable to give pain scale  - Standing Tylenol 650mg PO Q6  - Lidocaine patch X2(upper and lower back)  - Tramadol 25mg given once at 2100 on 4/24 due to pain  - Tramadol 12.5mg Q8 for breakthrough pain    Pulm   - Call provider if SPO2 < 94%   - CT PE protocol completed at [facility] negative       GI   #Nutrition/Fluids/Electrolytes    - replete K<4 and Mg <2   - s/p bedside swallow eval with SLP, recommending pureed diet w/ thin liquids- Tolerating well.  - IVF: None  - Downtrending CK    #Gallbladder wall thickening  - Incidental finding in pan scan for Trauma w/u.  - Patient asymptomatic, negative burns's sign  - CT abd/pelvis: Cholelitiasis w/ bladder wall thickening   - abdominal US w/ possible cholecystitis- can f/u outpt w/ GI or Sx for poss HIDA scan i/s/o incidental abd U/S finding. Pt afebrile, LFTs WNL, benign abd exam.  - Lipase/Amylase WNL.    Heme  - Hgb: 12.4   - Iron studies WNL not indicative of iron deficiency anemia  - Total Binding Capacity.: 238 ug/dL  - % Saturation, Iron: 25 %  - Iron Total, Serum: 60 ug/dL  - Unsaturated Iron Binding Capacity: 178 ug/dL       Renal   - Patient with unknown down time, CK elevated concerning rhabdo   - Downtrending CK, IVF D/C'd, encourage PO intake.    Infectious Disease   - Afebrile  - Continue to monitor for leukocytosis    Endocrine   - A1C results: 5.1   - TSH results: 1.76     DVT Prophylaxis   - C/W SQL for DVT PPx  - SCDs for DVT prophylaxis          IDR Goals: Goals reviewed at interdisciplinary rounds with case management, social work, physical therapy, occupational therapy, and speech language pathology.      Please see specific therapy  notes for in depth goals.     Dispo: Acute rehab

## 2023-04-26 LAB
ALBUMIN SERPL ELPH-MCNC: 3.4 G/DL — SIGNIFICANT CHANGE UP (ref 3.3–5)
ALP SERPL-CCNC: 50 U/L — SIGNIFICANT CHANGE UP (ref 40–120)
ALT FLD-CCNC: 39 U/L — SIGNIFICANT CHANGE UP (ref 10–45)
ANION GAP SERPL CALC-SCNC: 9 MMOL/L — SIGNIFICANT CHANGE UP (ref 5–17)
AST SERPL-CCNC: 45 U/L — HIGH (ref 10–40)
BILIRUB SERPL-MCNC: 0.4 MG/DL — SIGNIFICANT CHANGE UP (ref 0.2–1.2)
BUN SERPL-MCNC: 14 MG/DL — SIGNIFICANT CHANGE UP (ref 7–23)
CALCIUM SERPL-MCNC: 8.4 MG/DL — SIGNIFICANT CHANGE UP (ref 8.4–10.5)
CHLORIDE SERPL-SCNC: 101 MMOL/L — SIGNIFICANT CHANGE UP (ref 96–108)
CO2 SERPL-SCNC: 26 MMOL/L — SIGNIFICANT CHANGE UP (ref 22–31)
CREAT SERPL-MCNC: 0.81 MG/DL — SIGNIFICANT CHANGE UP (ref 0.5–1.3)
CULTURE RESULTS: SIGNIFICANT CHANGE UP
CULTURE RESULTS: SIGNIFICANT CHANGE UP
EGFR: 85 ML/MIN/1.73M2 — SIGNIFICANT CHANGE UP
GLUCOSE SERPL-MCNC: 117 MG/DL — HIGH (ref 70–99)
HCT VFR BLD CALC: 33.8 % — LOW (ref 39–50)
HGB BLD-MCNC: 11.2 G/DL — LOW (ref 13–17)
MAGNESIUM SERPL-MCNC: 2 MG/DL — SIGNIFICANT CHANGE UP (ref 1.6–2.6)
MCHC RBC-ENTMCNC: 32.7 PG — SIGNIFICANT CHANGE UP (ref 27–34)
MCHC RBC-ENTMCNC: 33.1 GM/DL — SIGNIFICANT CHANGE UP (ref 32–36)
MCV RBC AUTO: 98.5 FL — SIGNIFICANT CHANGE UP (ref 80–100)
NRBC # BLD: 0 /100 WBCS — SIGNIFICANT CHANGE UP (ref 0–0)
PHOSPHATE SERPL-MCNC: 2.8 MG/DL — SIGNIFICANT CHANGE UP (ref 2.5–4.5)
PLATELET # BLD AUTO: 176 K/UL — SIGNIFICANT CHANGE UP (ref 150–400)
POTASSIUM SERPL-MCNC: 3.7 MMOL/L — SIGNIFICANT CHANGE UP (ref 3.5–5.3)
POTASSIUM SERPL-SCNC: 3.7 MMOL/L — SIGNIFICANT CHANGE UP (ref 3.5–5.3)
PROT SERPL-MCNC: 5.8 G/DL — LOW (ref 6–8.3)
RBC # BLD: 3.43 M/UL — LOW (ref 4.2–5.8)
RBC # FLD: 12.2 % — SIGNIFICANT CHANGE UP (ref 10.3–14.5)
SODIUM SERPL-SCNC: 136 MMOL/L — SIGNIFICANT CHANGE UP (ref 135–145)
SPECIMEN SOURCE: SIGNIFICANT CHANGE UP
SPECIMEN SOURCE: SIGNIFICANT CHANGE UP
WBC # BLD: 6.33 K/UL — SIGNIFICANT CHANGE UP (ref 3.8–10.5)
WBC # FLD AUTO: 6.33 K/UL — SIGNIFICANT CHANGE UP (ref 3.8–10.5)

## 2023-04-26 PROCEDURE — 99233 SBSQ HOSP IP/OBS HIGH 50: CPT

## 2023-04-26 RX ORDER — POTASSIUM CHLORIDE 20 MEQ
20 PACKET (EA) ORAL ONCE
Refills: 0 | Status: COMPLETED | OUTPATIENT
Start: 2023-04-26 | End: 2023-04-26

## 2023-04-26 RX ADMIN — Medication 650 MILLIGRAM(S): at 06:01

## 2023-04-26 RX ADMIN — QUETIAPINE FUMARATE 12.5 MILLIGRAM(S): 200 TABLET, FILM COATED ORAL at 11:58

## 2023-04-26 RX ADMIN — LIDOCAINE 1 PATCH: 4 CREAM TOPICAL at 11:58

## 2023-04-26 RX ADMIN — Medication 50 MILLIGRAM(S): at 13:06

## 2023-04-26 RX ADMIN — Medication 50 MILLIGRAM(S): at 18:19

## 2023-04-26 RX ADMIN — LIDOCAINE 1 PATCH: 4 CREAM TOPICAL at 19:40

## 2023-04-26 RX ADMIN — FINASTERIDE 5 MILLIGRAM(S): 5 TABLET, FILM COATED ORAL at 11:58

## 2023-04-26 RX ADMIN — LIDOCAINE 1 PATCH: 4 CREAM TOPICAL at 11:59

## 2023-04-26 RX ADMIN — TAMSULOSIN HYDROCHLORIDE 0.4 MILLIGRAM(S): 0.4 CAPSULE ORAL at 21:17

## 2023-04-26 RX ADMIN — SENNA PLUS 2 TABLET(S): 8.6 TABLET ORAL at 21:17

## 2023-04-26 RX ADMIN — LIDOCAINE 1 PATCH: 4 CREAM TOPICAL at 01:34

## 2023-04-26 RX ADMIN — DONEPEZIL HYDROCHLORIDE 5 MILLIGRAM(S): 10 TABLET, FILM COATED ORAL at 21:16

## 2023-04-26 RX ADMIN — LEVETIRACETAM 400 MILLIGRAM(S): 250 TABLET, FILM COATED ORAL at 18:19

## 2023-04-26 RX ADMIN — Medication 20 MILLIEQUIVALENT(S): at 13:06

## 2023-04-26 RX ADMIN — Medication 50 MILLIGRAM(S): at 06:01

## 2023-04-26 RX ADMIN — LEVETIRACETAM 400 MILLIGRAM(S): 250 TABLET, FILM COATED ORAL at 06:01

## 2023-04-26 RX ADMIN — Medication 650 MILLIGRAM(S): at 07:00

## 2023-04-26 RX ADMIN — ENOXAPARIN SODIUM 40 MILLIGRAM(S): 100 INJECTION SUBCUTANEOUS at 18:19

## 2023-04-26 RX ADMIN — Medication 650 MILLIGRAM(S): at 11:58

## 2023-04-26 RX ADMIN — Medication 650 MILLIGRAM(S): at 12:58

## 2023-04-26 NOTE — PROGRESS NOTE ADULT - SUBJECTIVE AND OBJECTIVE BOX
**INCOMPLETE NOTE    OVERNIGHT EVENTS:    SUBJECTIVE:  Patient seen and examined at bedside.    Vital Signs Last 12 Hrs  T(F): 97.4 (04-26-23 @ 06:00), Max: 98 (04-25-23 @ 20:47)  HR: 83 (04-26-23 @ 06:00) (70 - 83)  BP: 158/83 (04-26-23 @ 06:00) (158/83 - 168/80)  BP(mean): --  RR: 19 (04-26-23 @ 06:00) (18 - 19)  SpO2: 98% (04-26-23 @ 06:00) (97% - 98%)  I&O's Summary    25 Apr 2023 07:01  -  26 Apr 2023 07:00  --------------------------------------------------------  IN: 800 mL / OUT: 300 mL / NET: 500 mL        PHYSICAL EXAM:  Constitutional: NAD, comfortable in bed.  HEENT: NC/AT, PERRLA, EOMI, no conjunctival pallor or scleral icterus, MMM  Neck: Supple, no JVD  Respiratory: CTA B/L. No w/r/r.   Cardiovascular: RRR, normal S1 and S2, no m/r/g.   Gastrointestinal: +BS, soft NTND, no guarding or rebound tenderness, no palpable masses   Extremities: wwp; no cyanosis, clubbing or edema.   Vascular: Pulses equal and strong throughout.   Neurological: AAOx3, no CN deficits, strength and sensation intact throughout.   Skin: No gross skin abnormalities or rashes        LABS:                        11.5   6.51  )-----------( 159      ( 25 Apr 2023 05:30 )             34.3     04-26    136  |  101  |  x   ----------------------------<  x   3.7   |  x   |  x     Ca    8.6      25 Apr 2023 05:30  Phos  2.8     04-26  Mg     2.0     04-26    TPro  5.8<L>  /  Alb  3.3  /  TBili  0.4  /  DBili  x   /  AST  33  /  ALT  26  /  AlkPhos  49  04-25            RADIOLOGY & ADDITIONAL TESTS:    MEDICATIONS  (STANDING):  acetaminophen     Tablet .. 650 milliGRAM(s) Oral every 6 hours  donepezil 5 milliGRAM(s) Oral at bedtime  enoxaparin Injectable 40 milliGRAM(s) SubCutaneous every 24 hours  finasteride 5 milliGRAM(s) Oral daily  hydrALAZINE 50 milliGRAM(s) Oral every 8 hours  levETIRAcetam  IVPB 500 milliGRAM(s) IV Intermittent every 12 hours  lidocaine   4% Patch 1 Patch Transdermal every 24 hours  lidocaine   4% Patch 1 Patch Transdermal every 24 hours  metoprolol tartrate 50 milliGRAM(s) Oral every 12 hours  polyethylene glycol 3350 17 Gram(s) Oral daily  QUEtiapine 12.5 milliGRAM(s) Oral daily  senna 2 Tablet(s) Oral at bedtime  tamsulosin 0.4 milliGRAM(s) Oral at bedtime    MEDICATIONS  (PRN):  traMADol 25 milliGRAM(s) Oral every 8 hours PRN Moderate Pain (4 - 6)   OVERNIGHT EVENTS: No acute overnight events    SUBJECTIVE:  Patient seen and examined at bedside. Reports upper back pain but has no other complaints at this time. Denies CP, SOB, abd pain, n/v/c/d.     Vital Signs Last 12 Hrs  T(F): 97.4 (04-26-23 @ 06:00), Max: 98 (04-25-23 @ 20:47)  HR: 83 (04-26-23 @ 06:00) (70 - 83)  BP: 158/83 (04-26-23 @ 06:00) (158/83 - 168/80)  BP(mean): --  RR: 19 (04-26-23 @ 06:00) (18 - 19)  SpO2: 98% (04-26-23 @ 06:00) (97% - 98%)  I&O's Summary    25 Apr 2023 07:01  -  26 Apr 2023 07:00  --------------------------------------------------------  IN: 800 mL / OUT: 300 mL / NET: 500 mL        PHYSICAL EXAM:  Constitutional: NAD, comfortable in bed.  HEENT: NC/AT, EOMI, no conjunctival pallor or scleral icterus, MMM  Neck: Supple  Respiratory: CTA B/L. No w/r/r.   Cardiovascular: RRR, normal S1 and S2, no m/r/g.   Gastrointestinal: soft NTND, no guarding or rebound tenderness, no palpable masses   Extremities: wwp; no cyanosis, clubbing or edema.   Vascular: Pulses equal and strong throughout.   Neurological: AAOx3, no focal deficits, strength and sensation intact throughout.        LABS:                        11.5   6.51  )-----------( 159      ( 25 Apr 2023 05:30 )             34.3     04-26    136  |  101  |  x   ----------------------------<  x   3.7   |  x   |  x     Ca    8.6      25 Apr 2023 05:30  Phos  2.8     04-26  Mg     2.0     04-26    TPro  5.8<L>  /  Alb  3.3  /  TBili  0.4  /  DBili  x   /  AST  33  /  ALT  26  /  AlkPhos  49  04-25            RADIOLOGY & ADDITIONAL TESTS:    MEDICATIONS  (STANDING):  acetaminophen     Tablet .. 650 milliGRAM(s) Oral every 6 hours  donepezil 5 milliGRAM(s) Oral at bedtime  enoxaparin Injectable 40 milliGRAM(s) SubCutaneous every 24 hours  finasteride 5 milliGRAM(s) Oral daily  hydrALAZINE 50 milliGRAM(s) Oral every 8 hours  levETIRAcetam  IVPB 500 milliGRAM(s) IV Intermittent every 12 hours  lidocaine   4% Patch 1 Patch Transdermal every 24 hours  lidocaine   4% Patch 1 Patch Transdermal every 24 hours  metoprolol tartrate 50 milliGRAM(s) Oral every 12 hours  polyethylene glycol 3350 17 Gram(s) Oral daily  QUEtiapine 12.5 milliGRAM(s) Oral daily  senna 2 Tablet(s) Oral at bedtime  tamsulosin 0.4 milliGRAM(s) Oral at bedtime    MEDICATIONS  (PRN):  traMADol 25 milliGRAM(s) Oral every 8 hours PRN Moderate Pain (4 - 6)

## 2023-04-26 NOTE — PROGRESS NOTE ADULT - ASSESSMENT
87 year old Cantonese speaking male with PMH of dementia (AOx2 @ baseline, ambulates w/ cane or walker), HTN, prior CVA (on Plavix), BPH presented to [place] ED after unwitnessed falls x2 w/ unknown downtime. Found to have right frontal cortical IPH on CTH and transferred to [facility] for further workup.

## 2023-04-27 LAB
ALBUMIN SERPL ELPH-MCNC: 3.3 G/DL — SIGNIFICANT CHANGE UP (ref 3.3–5)
ALP SERPL-CCNC: 58 U/L — SIGNIFICANT CHANGE UP (ref 40–120)
ALT FLD-CCNC: 51 U/L — HIGH (ref 10–45)
ANION GAP SERPL CALC-SCNC: 6 MMOL/L — SIGNIFICANT CHANGE UP (ref 5–17)
ANION GAP SERPL CALC-SCNC: 9 MMOL/L — SIGNIFICANT CHANGE UP (ref 5–17)
AST SERPL-CCNC: 50 U/L — HIGH (ref 10–40)
BASOPHILS # BLD AUTO: 0.03 K/UL — SIGNIFICANT CHANGE UP (ref 0–0.2)
BASOPHILS NFR BLD AUTO: 0.5 % — SIGNIFICANT CHANGE UP (ref 0–2)
BILIRUB SERPL-MCNC: 0.4 MG/DL — SIGNIFICANT CHANGE UP (ref 0.2–1.2)
BLD GP AB SCN SERPL QL: NEGATIVE — SIGNIFICANT CHANGE UP
BUN SERPL-MCNC: 15 MG/DL — SIGNIFICANT CHANGE UP (ref 7–23)
BUN SERPL-MCNC: 15 MG/DL — SIGNIFICANT CHANGE UP (ref 7–23)
CALCIUM SERPL-MCNC: 8.9 MG/DL — SIGNIFICANT CHANGE UP (ref 8.4–10.5)
CALCIUM SERPL-MCNC: 9 MG/DL — SIGNIFICANT CHANGE UP (ref 8.4–10.5)
CHLORIDE SERPL-SCNC: 100 MMOL/L — SIGNIFICANT CHANGE UP (ref 96–108)
CHLORIDE SERPL-SCNC: 100 MMOL/L — SIGNIFICANT CHANGE UP (ref 96–108)
CO2 SERPL-SCNC: 26 MMOL/L — SIGNIFICANT CHANGE UP (ref 22–31)
CO2 SERPL-SCNC: 26 MMOL/L — SIGNIFICANT CHANGE UP (ref 22–31)
CREAT ?TM UR-MCNC: 76 MG/DL — SIGNIFICANT CHANGE UP
CREAT SERPL-MCNC: 0.86 MG/DL — SIGNIFICANT CHANGE UP (ref 0.5–1.3)
CREAT SERPL-MCNC: 0.86 MG/DL — SIGNIFICANT CHANGE UP (ref 0.5–1.3)
EGFR: 84 ML/MIN/1.73M2 — SIGNIFICANT CHANGE UP
EGFR: 84 ML/MIN/1.73M2 — SIGNIFICANT CHANGE UP
EOSINOPHIL # BLD AUTO: 0.12 K/UL — SIGNIFICANT CHANGE UP (ref 0–0.5)
EOSINOPHIL NFR BLD AUTO: 1.9 % — SIGNIFICANT CHANGE UP (ref 0–6)
GLUCOSE SERPL-MCNC: 116 MG/DL — HIGH (ref 70–99)
GLUCOSE SERPL-MCNC: 118 MG/DL — HIGH (ref 70–99)
HCT VFR BLD CALC: 33.8 % — LOW (ref 39–50)
HGB BLD-MCNC: 11.3 G/DL — LOW (ref 13–17)
IMM GRANULOCYTES NFR BLD AUTO: 0.3 % — SIGNIFICANT CHANGE UP (ref 0–0.9)
LYMPHOCYTES # BLD AUTO: 0.93 K/UL — LOW (ref 1–3.3)
LYMPHOCYTES # BLD AUTO: 14.6 % — SIGNIFICANT CHANGE UP (ref 13–44)
MAGNESIUM SERPL-MCNC: 1.9 MG/DL — SIGNIFICANT CHANGE UP (ref 1.6–2.6)
MCHC RBC-ENTMCNC: 33.1 PG — SIGNIFICANT CHANGE UP (ref 27–34)
MCHC RBC-ENTMCNC: 33.4 GM/DL — SIGNIFICANT CHANGE UP (ref 32–36)
MCV RBC AUTO: 99.1 FL — SIGNIFICANT CHANGE UP (ref 80–100)
MONOCYTES # BLD AUTO: 0.53 K/UL — SIGNIFICANT CHANGE UP (ref 0–0.9)
MONOCYTES NFR BLD AUTO: 8.3 % — SIGNIFICANT CHANGE UP (ref 2–14)
NEUTROPHILS # BLD AUTO: 4.73 K/UL — SIGNIFICANT CHANGE UP (ref 1.8–7.4)
NEUTROPHILS NFR BLD AUTO: 74.4 % — SIGNIFICANT CHANGE UP (ref 43–77)
NRBC # BLD: 0 /100 WBCS — SIGNIFICANT CHANGE UP (ref 0–0)
PHOSPHATE SERPL-MCNC: 3 MG/DL — SIGNIFICANT CHANGE UP (ref 2.5–4.5)
PLATELET # BLD AUTO: 172 K/UL — SIGNIFICANT CHANGE UP (ref 150–400)
POTASSIUM SERPL-MCNC: 4 MMOL/L — SIGNIFICANT CHANGE UP (ref 3.5–5.3)
POTASSIUM SERPL-MCNC: 4.1 MMOL/L — SIGNIFICANT CHANGE UP (ref 3.5–5.3)
POTASSIUM SERPL-SCNC: 4 MMOL/L — SIGNIFICANT CHANGE UP (ref 3.5–5.3)
POTASSIUM SERPL-SCNC: 4.1 MMOL/L — SIGNIFICANT CHANGE UP (ref 3.5–5.3)
PROT SERPL-MCNC: 6.1 G/DL — SIGNIFICANT CHANGE UP (ref 6–8.3)
RBC # BLD: 3.41 M/UL — LOW (ref 4.2–5.8)
RBC # FLD: 12.4 % — SIGNIFICANT CHANGE UP (ref 10.3–14.5)
RH IG SCN BLD-IMP: POSITIVE — SIGNIFICANT CHANGE UP
SARS-COV-2 RNA SPEC QL NAA+PROBE: NEGATIVE — SIGNIFICANT CHANGE UP
SODIUM SERPL-SCNC: 132 MMOL/L — LOW (ref 135–145)
SODIUM SERPL-SCNC: 135 MMOL/L — SIGNIFICANT CHANGE UP (ref 135–145)
SODIUM UR-SCNC: 90 MMOL/L — SIGNIFICANT CHANGE UP
UUN UR-MCNC: 527 MG/DL — SIGNIFICANT CHANGE UP
WBC # BLD: 6.36 K/UL — SIGNIFICANT CHANGE UP (ref 3.8–10.5)
WBC # FLD AUTO: 6.36 K/UL — SIGNIFICANT CHANGE UP (ref 3.8–10.5)

## 2023-04-27 PROCEDURE — 99233 SBSQ HOSP IP/OBS HIGH 50: CPT

## 2023-04-27 RX ORDER — LANOLIN ALCOHOL/MO/W.PET/CERES
5 CREAM (GRAM) TOPICAL ONCE
Refills: 0 | Status: COMPLETED | OUTPATIENT
Start: 2023-04-27 | End: 2023-04-27

## 2023-04-27 RX ORDER — LEVETIRACETAM 250 MG/1
500 TABLET, FILM COATED ORAL
Refills: 0 | Status: DISCONTINUED | OUTPATIENT
Start: 2023-04-27 | End: 2023-04-28

## 2023-04-27 RX ORDER — CLOPIDOGREL BISULFATE 75 MG/1
1 TABLET, FILM COATED ORAL
Refills: 0 | DISCHARGE

## 2023-04-27 RX ORDER — QUETIAPINE FUMARATE 200 MG/1
12.5 TABLET, FILM COATED ORAL DAILY
Refills: 0 | Status: DISCONTINUED | OUTPATIENT
Start: 2023-04-27 | End: 2023-04-28

## 2023-04-27 RX ORDER — LANOLIN ALCOHOL/MO/W.PET/CERES
5 CREAM (GRAM) TOPICAL AT BEDTIME
Refills: 0 | Status: DISCONTINUED | OUTPATIENT
Start: 2023-04-26 | End: 2023-04-28

## 2023-04-27 RX ADMIN — Medication 650 MILLIGRAM(S): at 12:44

## 2023-04-27 RX ADMIN — Medication 50 MILLIGRAM(S): at 06:59

## 2023-04-27 RX ADMIN — Medication 5 MILLIGRAM(S): at 22:18

## 2023-04-27 RX ADMIN — Medication 650 MILLIGRAM(S): at 11:59

## 2023-04-27 RX ADMIN — Medication 650 MILLIGRAM(S): at 06:59

## 2023-04-27 RX ADMIN — LIDOCAINE 1 PATCH: 4 CREAM TOPICAL at 12:01

## 2023-04-27 RX ADMIN — Medication 650 MILLIGRAM(S): at 07:29

## 2023-04-27 RX ADMIN — Medication 50 MILLIGRAM(S): at 17:24

## 2023-04-27 RX ADMIN — LIDOCAINE 1 PATCH: 4 CREAM TOPICAL at 12:00

## 2023-04-27 RX ADMIN — QUETIAPINE FUMARATE 12.5 MILLIGRAM(S): 200 TABLET, FILM COATED ORAL at 11:59

## 2023-04-27 RX ADMIN — LIDOCAINE 1 PATCH: 4 CREAM TOPICAL at 00:20

## 2023-04-27 RX ADMIN — Medication 50 MILLIGRAM(S): at 13:20

## 2023-04-27 RX ADMIN — DONEPEZIL HYDROCHLORIDE 5 MILLIGRAM(S): 10 TABLET, FILM COATED ORAL at 22:19

## 2023-04-27 RX ADMIN — LIDOCAINE 1 PATCH: 4 CREAM TOPICAL at 17:51

## 2023-04-27 RX ADMIN — LEVETIRACETAM 500 MILLIGRAM(S): 250 TABLET, FILM COATED ORAL at 17:53

## 2023-04-27 RX ADMIN — LIDOCAINE 1 PATCH: 4 CREAM TOPICAL at 17:49

## 2023-04-27 RX ADMIN — LEVETIRACETAM 400 MILLIGRAM(S): 250 TABLET, FILM COATED ORAL at 06:58

## 2023-04-27 RX ADMIN — Medication 650 MILLIGRAM(S): at 17:24

## 2023-04-27 RX ADMIN — Medication 5 MILLIGRAM(S): at 01:28

## 2023-04-27 RX ADMIN — FINASTERIDE 5 MILLIGRAM(S): 5 TABLET, FILM COATED ORAL at 12:00

## 2023-04-27 RX ADMIN — Medication 650 MILLIGRAM(S): at 17:50

## 2023-04-27 RX ADMIN — SENNA PLUS 2 TABLET(S): 8.6 TABLET ORAL at 22:18

## 2023-04-27 RX ADMIN — Medication 50 MILLIGRAM(S): at 06:58

## 2023-04-27 RX ADMIN — LIDOCAINE 1 PATCH: 4 CREAM TOPICAL at 00:21

## 2023-04-27 RX ADMIN — POLYETHYLENE GLYCOL 3350 17 GRAM(S): 17 POWDER, FOR SOLUTION ORAL at 12:01

## 2023-04-27 RX ADMIN — ENOXAPARIN SODIUM 40 MILLIGRAM(S): 100 INJECTION SUBCUTANEOUS at 17:23

## 2023-04-27 RX ADMIN — TAMSULOSIN HYDROCHLORIDE 0.4 MILLIGRAM(S): 0.4 CAPSULE ORAL at 22:19

## 2023-04-27 NOTE — PROGRESS NOTE ADULT - SUBJECTIVE AND OBJECTIVE BOX
INTERVAL HPI/OVERNIGHT EVENTS: doyle o/n    SUBJECTIVE: Patient seen and examined at bedside.   [employer]  used for visit  pt reports feeling well overall. No chest pain, dyspnea, nausea, abd pain. Denies back pain or numbness. No headache    OBJECTIVE:    VITAL SIGNS:  ICU Vital Signs Last 24 Hrs  T(C): 36.1 (27 Apr 2023 11:56), Max: 36.7 (26 Apr 2023 21:16)  T(F): 97 (27 Apr 2023 11:56), Max: 98.1 (27 Apr 2023 06:13)  HR: 61 (27 Apr 2023 11:56) (58 - 89)  BP: 126/69 (27 Apr 2023 11:56) (126/69 - 146/80)  BP(mean): --  ABP: --  ABP(mean): --  RR: 18 (27 Apr 2023 11:56) (17 - 18)  SpO2: 98% (27 Apr 2023 11:56) (96% - 98%)    O2 Parameters below as of 27 Apr 2023 11:56  Patient On (Oxygen Delivery Method): room air              CAPILLARY BLOOD GLUCOSE          PHYSICAL EXAM:  GEN: Male in NAD on RA  HEENT: NC/AT, MMM  CV: RRR, nml S1S2, no murmurs  PULM: nml effort, CTAB  ABD: Soft, non-distended, NABS, non-tender  NEURO  A/O to self, hospital (states he is in [place])  5/5 in BUE and BLE.   Sensation intact  PSYCH: Appropriate      MEDICATIONS:  MEDICATIONS  (STANDING):  acetaminophen     Tablet .. 650 milliGRAM(s) Oral every 6 hours  donepezil 5 milliGRAM(s) Oral at bedtime  enoxaparin Injectable 40 milliGRAM(s) SubCutaneous every 24 hours  finasteride 5 milliGRAM(s) Oral daily  hydrALAZINE 50 milliGRAM(s) Oral every 8 hours  levETIRAcetam 500 milliGRAM(s) Oral two times a day  lidocaine   4% Patch 1 Patch Transdermal every 24 hours  lidocaine   4% Patch 1 Patch Transdermal every 24 hours  melatonin 5 milliGRAM(s) Oral at bedtime  metoprolol tartrate 50 milliGRAM(s) Oral every 12 hours  polyethylene glycol 3350 17 Gram(s) Oral daily  senna 2 Tablet(s) Oral at bedtime  tamsulosin 0.4 milliGRAM(s) Oral at bedtime    MEDICATIONS  (PRN):  QUEtiapine 12.5 milliGRAM(s) Oral daily PRN agitation  traMADol 25 milliGRAM(s) Oral every 8 hours PRN Moderate Pain (4 - 6)      ALLERGIES:  Allergies    No Known Allergies    Intolerances        LABS:                        11.3   6.36  )-----------( 172      ( 27 Apr 2023 05:30 )             33.8     04-27    135  |  100  |  15  ----------------------------<  116<H>  4.0   |  26  |  0.86    Ca    9.0      27 Apr 2023 14:00  Phos  3.0     04-27  Mg     1.9     04-27    TPro  6.1  /  Alb  3.3  /  TBili  0.4  /  DBili  x   /  AST  50<H>  /  ALT  51<H>  /  AlkPhos  58  04-27          RADIOLOGY & ADDITIONAL TESTS: Reviewed.

## 2023-04-27 NOTE — PROGRESS NOTE ADULT - ASSESSMENT
87 year old Cantonese speaking male with PMH of dementia (AOx2 @ baseline, ambulates w/ cane or walker), HTN, prior CVA (on Plavix), BPH presented to [place] ED after unwitnessed falls x2 w/ unknown downtime. Found to have right frontal cortical IPH on CTH and transferred to [facility] for further workup.

## 2023-04-27 NOTE — PROGRESS NOTE ADULT - ATTENDING COMMENTS
The patient is an 87-year-old Cantonese speaking male with a history of atrial fibrillation (previously on a/c, off now due to h/o cerebellar ICH), dementia, hypertension, prior stroke (previously on Plavix), admitted after fall/found down in setting to right cortical ICH.  CTA showed no underling vascular abnormality. MRI consistent with CAA. Suspect bleed is NONTRAUMATIC and related to CAA.  SBP<160, ideally <140- can adjust BP regimen PRN. Continue Keppra for 3 months with repeat EEG in outpatient setting. Patient may be candidate for watchman device in future. Will avoid a/c , antiplatelet for now given suspected CAA. Awaiting placement.
The patient is an 87-year-old Cantonese speaking male with a history of atrial fibrilation (previously on a/c, off now due to h/o cerebellar ICH), dementia, hypertension, prior stroke (previously on Plavix), admitted after fall/found down in setting to right cortical ICH. CTA showed no underling vascular abnormality. MRI consistent with CAA. SBP<160 - continue current BP regimen. Continue Keppra per neurosurgery thru 4/27. Patient may be candidate for watchman device in future. Will avoid a/c given suspected CAA. Awaiting placement.

## 2023-04-27 NOTE — PROGRESS NOTE ADULT - SUBJECTIVE AND OBJECTIVE BOX
**INCOMPLETE NOTE    OVERNIGHT EVENTS:    SUBJECTIVE:  Patient seen and examined at bedside.    Vital Signs Last 12 Hrs  T(F): 98.1 (04-27-23 @ 06:13), Max: 98.1 (04-27-23 @ 06:13)  HR: 89 (04-27-23 @ 06:13) (58 - 89)  BP: 146/80 (04-27-23 @ 06:13) (141/67 - 146/80)  BP(mean): --  RR: 17 (04-27-23 @ 06:13) (17 - 18)  SpO2: 98% (04-27-23 @ 06:13) (96% - 98%)  I&O's Summary      PHYSICAL EXAM:  Constitutional: NAD, comfortable in bed.  HEENT: NC/AT, PERRLA, EOMI, no conjunctival pallor or scleral icterus, MMM  Neck: Supple, no JVD  Respiratory: CTA B/L. No w/r/r.   Cardiovascular: RRR, normal S1 and S2, no m/r/g.   Gastrointestinal: +BS, soft NTND, no guarding or rebound tenderness, no palpable masses   Extremities: wwp; no cyanosis, clubbing or edema.   Vascular: Pulses equal and strong throughout.   Neurological: AAOx3, no CN deficits, strength and sensation intact throughout.   Skin: No gross skin abnormalities or rashes        LABS:                        11.2   6.33  )-----------( 176      ( 26 Apr 2023 05:30 )             33.8     04-26    136  |  101  |  14  ----------------------------<  117<H>  3.7   |  26  |  0.81    Ca    8.4      26 Apr 2023 05:30  Phos  2.8     04-26  Mg     2.0     04-26    TPro  5.8<L>  /  Alb  3.4  /  TBili  0.4  /  DBili  x   /  AST  45<H>  /  ALT  39  /  AlkPhos  50  04-26            RADIOLOGY & ADDITIONAL TESTS:    MEDICATIONS  (STANDING):  acetaminophen     Tablet .. 650 milliGRAM(s) Oral every 6 hours  donepezil 5 milliGRAM(s) Oral at bedtime  enoxaparin Injectable 40 milliGRAM(s) SubCutaneous every 24 hours  finasteride 5 milliGRAM(s) Oral daily  hydrALAZINE 50 milliGRAM(s) Oral every 8 hours  levETIRAcetam  IVPB 500 milliGRAM(s) IV Intermittent every 12 hours  lidocaine   4% Patch 1 Patch Transdermal every 24 hours  lidocaine   4% Patch 1 Patch Transdermal every 24 hours  melatonin 5 milliGRAM(s) Oral at bedtime  metoprolol tartrate 50 milliGRAM(s) Oral every 12 hours  polyethylene glycol 3350 17 Gram(s) Oral daily  QUEtiapine 12.5 milliGRAM(s) Oral daily  senna 2 Tablet(s) Oral at bedtime  tamsulosin 0.4 milliGRAM(s) Oral at bedtime    MEDICATIONS  (PRN):  traMADol 25 milliGRAM(s) Oral every 8 hours PRN Moderate Pain (4 - 6)   OVERNIGHT EVENTS: No acute overnight events.     SUBJECTIVE:  Patient seen and examined at bedside. Reports no complaints at this time.    Vital Signs Last 12 Hrs  T(F): 98.1 (04-27-23 @ 06:13), Max: 98.1 (04-27-23 @ 06:13)  HR: 89 (04-27-23 @ 06:13) (58 - 89)  BP: 146/80 (04-27-23 @ 06:13) (141/67 - 146/80)  BP(mean): --  RR: 17 (04-27-23 @ 06:13) (17 - 18)  SpO2: 98% (04-27-23 @ 06:13) (96% - 98%)  I&O's Summary      PHYSICAL EXAM:  Constitutional: NAD, comfortable in bed.  HEENT: NC/AT, EOMI, no conjunctival pallor or scleral icterus, MMM  Neck: Supple  Respiratory: CTA B/L. No w/r/r.   Cardiovascular: RRR, normal S1 and S2, no m/r/g.   Gastrointestinal: soft NTND, no guarding or rebound tenderness, no palpable masses   Extremities: wwp; no cyanosis, clubbing or edema.   Vascular: Pulses equal and strong throughout.   Neurological: AAOx2 (self, location)      LABS:                        11.2   6.33  )-----------( 176      ( 26 Apr 2023 05:30 )             33.8     04-26    136  |  101  |  14  ----------------------------<  117<H>  3.7   |  26  |  0.81    Ca    8.4      26 Apr 2023 05:30  Phos  2.8     04-26  Mg     2.0     04-26    TPro  5.8<L>  /  Alb  3.4  /  TBili  0.4  /  DBili  x   /  AST  45<H>  /  ALT  39  /  AlkPhos  50  04-26            RADIOLOGY & ADDITIONAL TESTS:    MEDICATIONS  (STANDING):  acetaminophen     Tablet .. 650 milliGRAM(s) Oral every 6 hours  donepezil 5 milliGRAM(s) Oral at bedtime  enoxaparin Injectable 40 milliGRAM(s) SubCutaneous every 24 hours  finasteride 5 milliGRAM(s) Oral daily  hydrALAZINE 50 milliGRAM(s) Oral every 8 hours  levETIRAcetam  IVPB 500 milliGRAM(s) IV Intermittent every 12 hours  lidocaine   4% Patch 1 Patch Transdermal every 24 hours  lidocaine   4% Patch 1 Patch Transdermal every 24 hours  melatonin 5 milliGRAM(s) Oral at bedtime  metoprolol tartrate 50 milliGRAM(s) Oral every 12 hours  polyethylene glycol 3350 17 Gram(s) Oral daily  QUEtiapine 12.5 milliGRAM(s) Oral daily  senna 2 Tablet(s) Oral at bedtime  tamsulosin 0.4 milliGRAM(s) Oral at bedtime    MEDICATIONS  (PRN):  traMADol 25 milliGRAM(s) Oral every 8 hours PRN Moderate Pain (4 - 6)

## 2023-04-27 NOTE — PROGRESS NOTE ADULT - ASSESSMENT
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 UA, UCX, BCx NGTD. BLE Doppler negative for DVT   - on keppra 500 q12    #HTN - -140. Home on toprol 50, hydralazine 25 TID   - on lopressor 50 BID and hydralazine 50 q8h   #AFib - Home on toprol 50. Holding AC d/t intraparenchymal hematoma  [id number] score 3 = 3.2% risk of stroke per year  MR brain redemonstrates frontal cortical hematoma    #T5-L5 Compression fracture - home on tylenol   - on standing tylenol and daily lidocaine patches   - PRN Tramadol  #Osteoporosis - Vit 25,OH-D at target. nml PTH.     #Rhabdomyolysis - improving. CK 1100 4/24. Peaked at 1500 4/21  #Cholelithiasis - asymptomatic  #Mild dementia - baseline reported to be a/o x2 - c/w donepezil 5 HS  #BPH - on flomax and finasteride    Recommend  Would make seroquel 12.5mg PRN HS in setting of slight increase in transaminases  F/U BMP - hyponatremia resolved. Agree w obtaining UA w urine Na, Cr, OSM  Discussed w daughter Kimberly today - would continue to hold anticoagulation and antiplatelet for Atrial fibrillation d/t presence of CAA. Discussed stroke risk and risk of further hemorrhage and fall risk.   Noted f/u w structural heart for Watchman procedure.     Optimize pain control - need to monitor pain control especially during PT, Mobilization, Transfers  Pt at high risk for delirium. Please optimize pain control, use caution with sedatives, anticholinergics. If agitated, would use redirection, re-orientation and avoid restraints as this will increase risk of and can worsen delirium    PPx: SQL    DISPO: acute rehab

## 2023-04-28 ENCOUNTER — TRANSCRIPTION ENCOUNTER (OUTPATIENT)
Age: 88
End: 2023-04-28

## 2023-04-28 VITALS
TEMPERATURE: 98 F | RESPIRATION RATE: 18 BRPM | HEART RATE: 62 BPM | OXYGEN SATURATION: 97 % | SYSTOLIC BLOOD PRESSURE: 147 MMHG | DIASTOLIC BLOOD PRESSURE: 62 MMHG

## 2023-04-28 PROBLEM — I63.9 CEREBRAL INFARCTION, UNSPECIFIED: Chronic | Status: ACTIVE | Noted: 2023-04-20

## 2023-04-28 PROBLEM — I10 ESSENTIAL (PRIMARY) HYPERTENSION: Chronic | Status: ACTIVE | Noted: 2023-04-20

## 2023-04-28 PROCEDURE — 82962 GLUCOSE BLOOD TEST: CPT

## 2023-04-28 PROCEDURE — 82150 ASSAY OF AMYLASE: CPT

## 2023-04-28 PROCEDURE — 83735 ASSAY OF MAGNESIUM: CPT

## 2023-04-28 PROCEDURE — 76705 ECHO EXAM OF ABDOMEN: CPT

## 2023-04-28 PROCEDURE — 36415 COLL VENOUS BLD VENIPUNCTURE: CPT

## 2023-04-28 PROCEDURE — 84443 ASSAY THYROID STIM HORMONE: CPT

## 2023-04-28 PROCEDURE — 84484 ASSAY OF TROPONIN QUANT: CPT

## 2023-04-28 PROCEDURE — 99239 HOSP IP/OBS DSCHRG MGMT >30: CPT

## 2023-04-28 PROCEDURE — 86850 RBC ANTIBODY SCREEN: CPT

## 2023-04-28 PROCEDURE — 70496 CT ANGIOGRAPHY HEAD: CPT

## 2023-04-28 PROCEDURE — 80048 BASIC METABOLIC PNL TOTAL CA: CPT

## 2023-04-28 PROCEDURE — 82728 ASSAY OF FERRITIN: CPT

## 2023-04-28 PROCEDURE — 95705 EEG W/O VID 2-12 HR UNMNTR: CPT

## 2023-04-28 PROCEDURE — 82310 ASSAY OF CALCIUM: CPT

## 2023-04-28 PROCEDURE — 87635 SARS-COV-2 COVID-19 AMP PRB: CPT

## 2023-04-28 PROCEDURE — 82550 ASSAY OF CK (CPK): CPT

## 2023-04-28 PROCEDURE — 83550 IRON BINDING TEST: CPT

## 2023-04-28 PROCEDURE — 97530 THERAPEUTIC ACTIVITIES: CPT

## 2023-04-28 PROCEDURE — 70551 MRI BRAIN STEM W/O DYE: CPT

## 2023-04-28 PROCEDURE — 84100 ASSAY OF PHOSPHORUS: CPT

## 2023-04-28 PROCEDURE — 80053 COMPREHEN METABOLIC PANEL: CPT

## 2023-04-28 PROCEDURE — 97165 OT EVAL LOW COMPLEX 30 MIN: CPT

## 2023-04-28 PROCEDURE — 97116 GAIT TRAINING THERAPY: CPT

## 2023-04-28 PROCEDURE — 85576 BLOOD PLATELET AGGREGATION: CPT

## 2023-04-28 PROCEDURE — 80061 LIPID PANEL: CPT

## 2023-04-28 PROCEDURE — 97112 NEUROMUSCULAR REEDUCATION: CPT

## 2023-04-28 PROCEDURE — 85025 COMPLETE CBC W/AUTO DIFF WBC: CPT

## 2023-04-28 PROCEDURE — 86900 BLOOD TYPING SEROLOGIC ABO: CPT

## 2023-04-28 PROCEDURE — 82553 CREATINE MB FRACTION: CPT

## 2023-04-28 PROCEDURE — 99232 SBSQ HOSP IP/OBS MODERATE 35: CPT

## 2023-04-28 PROCEDURE — 97162 PT EVAL MOD COMPLEX 30 MIN: CPT

## 2023-04-28 PROCEDURE — 70450 CT HEAD/BRAIN W/O DYE: CPT

## 2023-04-28 PROCEDURE — 73590 X-RAY EXAM OF LOWER LEG: CPT

## 2023-04-28 PROCEDURE — 95700 EEG CONT REC W/VID EEG TECH: CPT

## 2023-04-28 PROCEDURE — 85027 COMPLETE CBC AUTOMATED: CPT

## 2023-04-28 PROCEDURE — 93306 TTE W/DOPPLER COMPLETE: CPT

## 2023-04-28 PROCEDURE — 83690 ASSAY OF LIPASE: CPT

## 2023-04-28 PROCEDURE — 85610 PROTHROMBIN TIME: CPT

## 2023-04-28 PROCEDURE — 97110 THERAPEUTIC EXERCISES: CPT

## 2023-04-28 PROCEDURE — 70498 CT ANGIOGRAPHY NECK: CPT

## 2023-04-28 PROCEDURE — 92610 EVALUATE SWALLOWING FUNCTION: CPT

## 2023-04-28 PROCEDURE — 82306 VITAMIN D 25 HYDROXY: CPT

## 2023-04-28 PROCEDURE — 84540 ASSAY OF URINE/UREA-N: CPT

## 2023-04-28 PROCEDURE — 82570 ASSAY OF URINE CREATININE: CPT

## 2023-04-28 PROCEDURE — 95708 EEG WO VID EA 12-26HR UNMNTR: CPT

## 2023-04-28 PROCEDURE — 83970 ASSAY OF PARATHORMONE: CPT

## 2023-04-28 PROCEDURE — 83036 HEMOGLOBIN GLYCOSYLATED A1C: CPT

## 2023-04-28 PROCEDURE — 83540 ASSAY OF IRON: CPT

## 2023-04-28 PROCEDURE — 85730 THROMBOPLASTIN TIME PARTIAL: CPT

## 2023-04-28 PROCEDURE — 84300 ASSAY OF URINE SODIUM: CPT

## 2023-04-28 PROCEDURE — 86901 BLOOD TYPING SEROLOGIC RH(D): CPT

## 2023-04-28 PROCEDURE — 93970 EXTREMITY STUDY: CPT

## 2023-04-28 RX ORDER — AMLODIPINE BESYLATE 2.5 MG/1
2.5 TABLET ORAL EVERY 24 HOURS
Refills: 0 | Status: DISCONTINUED | OUTPATIENT
Start: 2023-04-28 | End: 2023-04-28

## 2023-04-28 RX ORDER — METOPROLOL TARTRATE 50 MG
1 TABLET ORAL
Qty: 30 | Refills: 0
Start: 2023-04-28 | End: 2023-05-27

## 2023-04-28 RX ORDER — METOPROLOL TARTRATE 50 MG
1 TABLET ORAL
Qty: 0 | Refills: 0 | DISCHARGE
Start: 2023-04-28

## 2023-04-28 RX ORDER — HYDRALAZINE HCL 50 MG
1 TABLET ORAL
Qty: 0 | Refills: 0 | DISCHARGE
Start: 2023-04-28

## 2023-04-28 RX ORDER — AMLODIPINE BESYLATE 2.5 MG/1
1 TABLET ORAL
Qty: 0 | Refills: 0 | DISCHARGE
Start: 2023-04-28

## 2023-04-28 RX ORDER — LANOLIN ALCOHOL/MO/W.PET/CERES
1 CREAM (GRAM) TOPICAL
Qty: 0 | Refills: 0 | DISCHARGE
Start: 2023-04-28

## 2023-04-28 RX ORDER — TRAZODONE HCL 50 MG
0 TABLET ORAL
Refills: 0 | DISCHARGE

## 2023-04-28 RX ORDER — LIDOCAINE 4 G/100G
1 CREAM TOPICAL
Qty: 0 | Refills: 0 | DISCHARGE
Start: 2023-04-28

## 2023-04-28 RX ORDER — METOPROLOL TARTRATE 50 MG
1 TABLET ORAL
Refills: 0 | DISCHARGE

## 2023-04-28 RX ORDER — ENOXAPARIN SODIUM 100 MG/ML
40 INJECTION SUBCUTANEOUS
Qty: 0 | Refills: 0 | DISCHARGE
Start: 2023-04-28

## 2023-04-28 RX ORDER — SENNA PLUS 8.6 MG/1
2 TABLET ORAL
Qty: 0 | Refills: 0 | DISCHARGE
Start: 2023-04-28

## 2023-04-28 RX ORDER — DONEPEZIL HYDROCHLORIDE 10 MG/1
1 TABLET, FILM COATED ORAL
Refills: 0 | DISCHARGE

## 2023-04-28 RX ORDER — LEVETIRACETAM 250 MG/1
1 TABLET, FILM COATED ORAL
Qty: 0 | Refills: 0 | DISCHARGE
Start: 2023-04-28

## 2023-04-28 RX ORDER — TRAMADOL HYDROCHLORIDE 50 MG/1
0.5 TABLET ORAL
Qty: 0 | Refills: 0 | DISCHARGE
Start: 2023-04-28

## 2023-04-28 RX ORDER — QUETIAPINE FUMARATE 200 MG/1
12.5 TABLET, FILM COATED ORAL
Qty: 0 | Refills: 0 | DISCHARGE
Start: 2023-04-28

## 2023-04-28 RX ORDER — POLYETHYLENE GLYCOL 3350 17 G/17G
17 POWDER, FOR SOLUTION ORAL
Qty: 0 | Refills: 0 | DISCHARGE
Start: 2023-04-28

## 2023-04-28 RX ORDER — ACETAMINOPHEN 500 MG
2 TABLET ORAL
Qty: 0 | Refills: 0 | DISCHARGE
Start: 2023-04-28

## 2023-04-28 RX ORDER — DONEPEZIL HYDROCHLORIDE 10 MG/1
1 TABLET, FILM COATED ORAL
Qty: 0 | Refills: 0 | DISCHARGE
Start: 2023-04-28

## 2023-04-28 RX ADMIN — Medication 650 MILLIGRAM(S): at 07:30

## 2023-04-28 RX ADMIN — LIDOCAINE 1 PATCH: 4 CREAM TOPICAL at 12:38

## 2023-04-28 RX ADMIN — Medication 650 MILLIGRAM(S): at 06:30

## 2023-04-28 RX ADMIN — LIDOCAINE 1 PATCH: 4 CREAM TOPICAL at 12:37

## 2023-04-28 RX ADMIN — Medication 50 MILLIGRAM(S): at 13:50

## 2023-04-28 RX ADMIN — FINASTERIDE 5 MILLIGRAM(S): 5 TABLET, FILM COATED ORAL at 12:29

## 2023-04-28 RX ADMIN — AMLODIPINE BESYLATE 2.5 MILLIGRAM(S): 2.5 TABLET ORAL at 12:29

## 2023-04-28 RX ADMIN — LIDOCAINE 1 PATCH: 4 CREAM TOPICAL at 00:32

## 2023-04-28 RX ADMIN — LEVETIRACETAM 500 MILLIGRAM(S): 250 TABLET, FILM COATED ORAL at 06:29

## 2023-04-28 RX ADMIN — Medication 650 MILLIGRAM(S): at 01:46

## 2023-04-28 RX ADMIN — Medication 50 MILLIGRAM(S): at 06:30

## 2023-04-28 RX ADMIN — Medication 650 MILLIGRAM(S): at 00:46

## 2023-04-28 RX ADMIN — Medication 650 MILLIGRAM(S): at 13:25

## 2023-04-28 RX ADMIN — Medication 650 MILLIGRAM(S): at 12:29

## 2023-04-28 NOTE — PROGRESS NOTE ADULT - SUBJECTIVE AND OBJECTIVE BOX
INTERVAL HPI/OVERNIGHT EVENTS: doyle o/n    SUBJECTIVE: Patient seen and examined at bedside.   [employer]  used   Pt feels well - denies any back pain when mobilizing in bed. No headache, chest pain, dyspnea, N/V/Abd pain. +BM    OBJECTIVE:    VITAL SIGNS:  ICU Vital Signs Last 24 Hrs  T(C): 36.7 (28 Apr 2023 12:33), Max: 36.7 (28 Apr 2023 12:33)  T(F): 98.1 (28 Apr 2023 12:33), Max: 98.1 (28 Apr 2023 12:33)  HR: 62 (28 Apr 2023 12:33) (59 - 68)  BP: 147/62 (28 Apr 2023 12:33) (134/56 - 147/62)  BP(mean): --  ABP: --  ABP(mean): --  RR: 18 (28 Apr 2023 12:33) (18 - 18)  SpO2: 97% (28 Apr 2023 12:33) (96% - 98%)    O2 Parameters below as of 28 Apr 2023 12:33  Patient On (Oxygen Delivery Method): room air              CAPILLARY BLOOD GLUCOSE          PHYSICAL EXAM:  GEN: Male in NAD on RA  HEENT: NC/AT, MMM  CV: RRR, nml S1S2, no murmurs  PULM: nml effort, CTAB  ABD: Soft, non-distended, NABS, non-tender  NEURO  A/O to self, hospital (states he is in [place])  5/5 in BUE and BLE.   Sensation intact  PSYCH: Appropriate    MEDICATIONS:  MEDICATIONS  (STANDING):  acetaminophen     Tablet .. 650 milliGRAM(s) Oral every 6 hours  amLODIPine   Tablet 2.5 milliGRAM(s) Oral every 24 hours  donepezil 5 milliGRAM(s) Oral at bedtime  enoxaparin Injectable 40 milliGRAM(s) SubCutaneous every 24 hours  finasteride 5 milliGRAM(s) Oral daily  hydrALAZINE 50 milliGRAM(s) Oral every 8 hours  levETIRAcetam 500 milliGRAM(s) Oral two times a day  lidocaine   4% Patch 1 Patch Transdermal every 24 hours  lidocaine   4% Patch 1 Patch Transdermal every 24 hours  melatonin 5 milliGRAM(s) Oral at bedtime  metoprolol tartrate 50 milliGRAM(s) Oral every 12 hours  polyethylene glycol 3350 17 Gram(s) Oral daily  senna 2 Tablet(s) Oral at bedtime  tamsulosin 0.4 milliGRAM(s) Oral at bedtime    MEDICATIONS  (PRN):  QUEtiapine 12.5 milliGRAM(s) Oral daily PRN agitation  traMADol 25 milliGRAM(s) Oral every 8 hours PRN Moderate Pain (4 - 6)      ALLERGIES:  Allergies    No Known Allergies    Intolerances        LABS:                        11.3   6.36  )-----------( 172      ( 27 Apr 2023 05:30 )             33.8     04-27    135  |  100  |  15  ----------------------------<  116<H>  4.0   |  26  |  0.86    Ca    9.0      27 Apr 2023 14:00  Phos  3.0     04-27  Mg     1.9     04-27    TPro  6.1  /  Alb  3.3  /  TBili  0.4  /  DBili  x   /  AST  50<H>  /  ALT  51<H>  /  AlkPhos  58  04-27          RADIOLOGY & ADDITIONAL TESTS: Reviewed.

## 2023-04-28 NOTE — PROGRESS NOTE ADULT - ASSESSMENT
IM    87 year old Cantonese speaking male with PMH of dementia (AOx2 @ baseline, ambulates w/ cane or walker), HTN, prior CVA (on Plavix), BPH presented to [place] ED after unwitnessed falls x2 w/ unknown downtime. 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     Problem/Plan - 1:  ·  Problem: CVA (cerebrovascular accident).   ·  Plan: - continue to hold home Plavix (plan to f/u outpt about resuming Plavix).  - q4hr stroke neuro checks and vitals   - MRI head: 2.7 cm acute/subacute right frontal lobe hematoma corresponding to CT head. Additional areas of chronic hemorrhage with atrophy. These findings may reflect amyloid angiopathy. No acute infarction   - HCT Results  4/20: Right frontal acute parenchymal hemorrhage   - HCT Results 4/21: Stable right frontal intraparenchymal hematoma   - CTA Results: Questionable FMD involving the distal right internal carotid artery   - Continue w/Keppra 500 BID X 7 days for seizure prophylaxis (4/21-4/27)  - vEEG D/C'd- Negative   - Stroke education.    Problem/Plan - 2:  ·  Problem: Dementia.   ·  Plan: - pt tugging at bundy line, requiring restraints after 5pm  - concern for sun downing  - c/w seroquel 12.5mg qd at 17:00  - continue to encourage redirection, reorientation and environment.    Problem/Plan - 3:  ·  Problem: HTN (hypertension).   ·  Plan: - -160   - Hydralazine 50mg Q8 with parameters to hold if <120 systolic  - BP readings >120 < 150 x 48 hours  - Continues on metoprolol 50mg BID  - TTE with bubble: Normal LV systolic function. No PFO.    Problem/Plan - 4:  ·  Problem: Elevated troponin.   ·  Plan: -Troponin downtrending 0.05 > 0.04 > 0.03 , likely demand ischemia, EKG w/ no new ST elevations, pAF and no complaints of chest pain   - CS consulted and states pt will likely need Watchman device in the future as outpatient.   - CS will discuss further with Dr. Chand and patient's family.    Problem/Plan - 5:  ·  Problem: Afib.   ·  Plan: - Known Hx of Afib  - Unclear if pt was on Eliquis prior to 2011(daughters unaware).  - Was started on plavix after extensive discussion w/pts Neuro and cards post cerebellar bleed in 2011.  - Plavix currently held for R Frontal IPH.  - Risk of starting Plavix out weighs benefits given amyloid angiopathy and areas of chronic hemorrhage.    Problem/Plan - 6:  ·  Problem: HLD (hyperlipidemia).   ·  Plan: - Holding statin in setting of IPH.    Problem/Plan - 7:  ·  Problem: Back pain.   ·  Plan: - Age indeterminate T5 and L5 Fx  - Pt consistently reports pain; unable to give pain scale  - Standing Tylenol 650mg PO Q6  - Lidocaine patch X2(upper and lower back)  - Tramadol 25mg given once at 2100 on 4/24 due to pain  - Tramadol 12.5mg Q8 for breakthrough pain.    Problem/Plan - 8:  ·  Problem: Thickening of wall of gallbladder.   ·  Plan: - Incidental finding in pan scan for Trauma w/u.  - Patient asymptomatic, negative burns's sign  - CT abd/pelvis: Cholelitiasis w/ bladder wall thickening   - abdominal US w/ possible cholecystitis- can f/u outpt w/ GI or Sx for poss HIDA scan i/s/o incidental abd U/S finding. Pt afebrile, LFTs WNL, benign abd exam.  - Lipase/Amylase WNL.    Problem/Plan - 9:  ·  Problem: Prophylactic measure.   ·  Plan: F: tolerating PO, no IVF  E: replete K<4, Mg<2  N: Pureed  VTE Prophylaxis: Lovenox 40mg q24h  GI: not needed  C: Full Code  D: RMF.

## 2023-04-28 NOTE — PROGRESS NOTE ADULT - PROBLEM SELECTOR PLAN 9
F: tolerating PO, no IVF  E: replete K<4, Mg<2  N: Pureed  VTE Prophylaxis: Lovenox 40mg q24h  GI: not needed  C: Full Code  D: RMF

## 2023-04-28 NOTE — PROGRESS NOTE ADULT - ASSESSMENT
87 year old Cantonese speaking male with PMH of dementia (AOx2 @ baseline, ambulates w/ cane or walker), HTN, prior CVA (on Plavix), BPH presented to [place] ED after unwitnessed falls x2 w/ unknown downtime. 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

## 2023-04-28 NOTE — PROGRESS NOTE ADULT - SUBJECTIVE AND OBJECTIVE BOX
**INCOMPLETE NOTE    OVERNIGHT EVENTS:    SUBJECTIVE:  Patient seen and examined at bedside.    Vital Signs Last 12 Hrs  T(F): 97.9 (04-28-23 @ 06:24), Max: 97.9 (04-28-23 @ 06:24)  HR: 61 (04-28-23 @ 06:33) (59 - 68)  BP: 135/72 (04-28-23 @ 06:33) (134/56 - 141/84)  BP(mean): --  RR: 18 (04-28-23 @ 06:24) (18 - 18)  SpO2: 98% (04-28-23 @ 06:24) (97% - 98%)  I&O's Summary      PHYSICAL EXAM:  Constitutional: NAD, comfortable in bed.  HEENT: NC/AT, PERRLA, EOMI, no conjunctival pallor or scleral icterus, MMM  Neck: Supple, no JVD  Respiratory: CTA B/L. No w/r/r.   Cardiovascular: RRR, normal S1 and S2, no m/r/g.   Gastrointestinal: +BS, soft NTND, no guarding or rebound tenderness, no palpable masses   Extremities: wwp; no cyanosis, clubbing or edema.   Vascular: Pulses equal and strong throughout.   Neurological: AAOx3, no CN deficits, strength and sensation intact throughout.   Skin: No gross skin abnormalities or rashes        LABS:                        11.3   6.36  )-----------( 172      ( 27 Apr 2023 05:30 )             33.8     04-27    135  |  100  |  15  ----------------------------<  116<H>  4.0   |  26  |  0.86    Ca    9.0      27 Apr 2023 14:00  Phos  3.0     04-27  Mg     1.9     04-27    TPro  6.1  /  Alb  3.3  /  TBili  0.4  /  DBili  x   /  AST  50<H>  /  ALT  51<H>  /  AlkPhos  58  04-27            RADIOLOGY & ADDITIONAL TESTS:    MEDICATIONS  (STANDING):  acetaminophen     Tablet .. 650 milliGRAM(s) Oral every 6 hours  donepezil 5 milliGRAM(s) Oral at bedtime  enoxaparin Injectable 40 milliGRAM(s) SubCutaneous every 24 hours  finasteride 5 milliGRAM(s) Oral daily  hydrALAZINE 50 milliGRAM(s) Oral every 8 hours  levETIRAcetam 500 milliGRAM(s) Oral two times a day  lidocaine   4% Patch 1 Patch Transdermal every 24 hours  lidocaine   4% Patch 1 Patch Transdermal every 24 hours  melatonin 5 milliGRAM(s) Oral at bedtime  metoprolol tartrate 50 milliGRAM(s) Oral every 12 hours  polyethylene glycol 3350 17 Gram(s) Oral daily  senna 2 Tablet(s) Oral at bedtime  tamsulosin 0.4 milliGRAM(s) Oral at bedtime    MEDICATIONS  (PRN):  QUEtiapine 12.5 milliGRAM(s) Oral daily PRN agitation  traMADol 25 milliGRAM(s) Oral every 8 hours PRN Moderate Pain (4 - 6)   OVERNIGHT EVENTS: No acute overnight events.    SUBJECTIVE: Patient seen and examined at bedside. No complaints at this time.    Vital Signs Last 12 Hrs  T(F): 97.9 (04-28-23 @ 06:24), Max: 97.9 (04-28-23 @ 06:24)  HR: 61 (04-28-23 @ 06:33) (59 - 68)  BP: 135/72 (04-28-23 @ 06:33) (134/56 - 141/84)  BP(mean): --  RR: 18 (04-28-23 @ 06:24) (18 - 18)  SpO2: 98% (04-28-23 @ 06:24) (97% - 98%)  I&O's Summary      PHYSICAL EXAM:  Constitutional: NAD, comfortable in bed.  HEENT: NC/AT, EOMI, no conjunctival pallor or scleral icterus, MMM  Neck: Supple  Respiratory: CTA B/L. No w/r/r.   Cardiovascular: RRR, normal S1 and S2, no m/r/g.   Gastrointestinal: soft NTND, no guarding or rebound tenderness, no palpable masses   Extremities: wwp; no cyanosis, clubbing or edema.   Vascular: Pulses equal and strong throughout.   Neurological: AAOx1-2 (self, location)        LABS:                        11.3   6.36  )-----------( 172      ( 27 Apr 2023 05:30 )             33.8     04-27    135  |  100  |  15  ----------------------------<  116<H>  4.0   |  26  |  0.86    Ca    9.0      27 Apr 2023 14:00  Phos  3.0     04-27  Mg     1.9     04-27    TPro  6.1  /  Alb  3.3  /  TBili  0.4  /  DBili  x   /  AST  50<H>  /  ALT  51<H>  /  AlkPhos  58  04-27            RADIOLOGY & ADDITIONAL TESTS:    MEDICATIONS  (STANDING):  acetaminophen     Tablet .. 650 milliGRAM(s) Oral every 6 hours  donepezil 5 milliGRAM(s) Oral at bedtime  enoxaparin Injectable 40 milliGRAM(s) SubCutaneous every 24 hours  finasteride 5 milliGRAM(s) Oral daily  hydrALAZINE 50 milliGRAM(s) Oral every 8 hours  levETIRAcetam 500 milliGRAM(s) Oral two times a day  lidocaine   4% Patch 1 Patch Transdermal every 24 hours  lidocaine   4% Patch 1 Patch Transdermal every 24 hours  melatonin 5 milliGRAM(s) Oral at bedtime  metoprolol tartrate 50 milliGRAM(s) Oral every 12 hours  polyethylene glycol 3350 17 Gram(s) Oral daily  senna 2 Tablet(s) Oral at bedtime  tamsulosin 0.4 milliGRAM(s) Oral at bedtime    MEDICATIONS  (PRN):  QUEtiapine 12.5 milliGRAM(s) Oral daily PRN agitation  traMADol 25 milliGRAM(s) Oral every 8 hours PRN Moderate Pain (4 - 6)

## 2023-04-28 NOTE — DISCHARGE NOTE NURSING/CASE MANAGEMENT/SOCIAL WORK - PATIENT PORTAL LINK FT
You can access the FollowMyHealth Patient Portal offered by [facility] by registering at the following website: [email or web address]. By joining [employer]’s FollowMyHealth portal, you will also be able to view your health information using other applications (apps) compatible with our system.

## 2023-04-28 NOTE — PROGRESS NOTE ADULT - SUBJECTIVE AND OBJECTIVE BOX
Physical Medicine and Rehabilitation Progress Note :       Patient is a 87y old  Male who presents with a chief complaint of R frontal IPH (28 Apr 2023 07:49)      HPI:  Patient is an 88 y/o Catonese speaking male with PMH of dementia (oriented x 2 at baseline, typically ambulates with cane or walker), HTN, prior CVA- on plavix. Last taken on 4/19. BPH. He presents with altered mental status in the setting of 2 falls. Per ED records, patient's daughters stated that he had recent insomnia. Subsequently, trazodone increased from 50 mg to 100 mg before bedtime 2 days ago. Pt had 2 falls today, both unwitnessed, unknown downtime. Daughter states patient had urinary incontinence. He presented to [place] ED by EMS. 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 He was started on Cardene and given Keppra. Last dose of Plavix was 4/19 and with possible NSTEMI, plavix not reversed. Currently denies chest pain, palpitations shortness of breath. Also had trauma imaging including CT Cspine/ chest/abd/pelvis. Transferred to [facility] for further management. ICH score 1, NIHSS 1. GCS 13. (20 Apr 2023 23:42)                            11.3   6.36  )-----------( 172      ( 27 Apr 2023 05:30 )             33.8       04-27    135  |  100  |  15  ----------------------------<  116<H>  4.0   |  26  |  0.86    Ca    9.0      27 Apr 2023 14:00  Phos  3.0     04-27  Mg     1.9     04-27    TPro  6.1  /  Alb  3.3  /  TBili  0.4  /  DBili  x   /  AST  50<H>  /  ALT  51<H>  /  AlkPhos  58  04-27    Vital Signs Last 24 Hrs  T(C): 36.6 (28 Apr 2023 06:24), Max: 36.6 (28 Apr 2023 06:24)  T(F): 97.9 (28 Apr 2023 06:24), Max: 97.9 (28 Apr 2023 06:24)  HR: 61 (28 Apr 2023 06:33) (59 - 68)  BP: 135/72 (28 Apr 2023 06:33) (126/69 - 146/68)  BP(mean): --  RR: 18 (28 Apr 2023 06:24) (18 - 18)  SpO2: 98% (28 Apr 2023 06:24) (96% - 98%)    Parameters below as of 28 Apr 2023 06:24  Patient On (Oxygen Delivery Method): room air        MEDICATIONS  (STANDING):  acetaminophen     Tablet .. 650 milliGRAM(s) Oral every 6 hours  amLODIPine   Tablet 2.5 milliGRAM(s) Oral every 24 hours  donepezil 5 milliGRAM(s) Oral at bedtime  enoxaparin Injectable 40 milliGRAM(s) SubCutaneous every 24 hours  finasteride 5 milliGRAM(s) Oral daily  hydrALAZINE 50 milliGRAM(s) Oral every 8 hours  levETIRAcetam 500 milliGRAM(s) Oral two times a day  lidocaine   4% Patch 1 Patch Transdermal every 24 hours  lidocaine   4% Patch 1 Patch Transdermal every 24 hours  melatonin 5 milliGRAM(s) Oral at bedtime  metoprolol tartrate 50 milliGRAM(s) Oral every 12 hours  polyethylene glycol 3350 17 Gram(s) Oral daily  senna 2 Tablet(s) Oral at bedtime  tamsulosin 0.4 milliGRAM(s) Oral at bedtime    MEDICATIONS  (PRN):  QUEtiapine 12.5 milliGRAM(s) Oral daily PRN agitation  traMADol 25 milliGRAM(s) Oral every 8 hours PRN Moderate Pain (4 - 6)       4/27/2023  Functional Status Assessment :         Pain Assessment/Number Scale (0-10) Adult  Presence of Pain: denies pain/discomfort (Rating = 0)  Pain Rating (0-10): Rest: 0 (no pain/absence of nonverbal indicators of pain)  Pain Rating (0-10): Activity: 0 (no pain/absence of nonverbal indicators of pain)    Safety      AM-PAC Functional Assessment: Basic Mobility  Type of Assessment: Daily assessment  Turning from your back to your side while in a flat bed without using bedrails?: 2 = A lot of assistance  Moving from lying on your back to sitting on the flat side of a flat bed without using bedrails?: 2 = A lot of assistance  Moving to and from a bed to a chair (including a wheelchair)?: 2 = A lot of assistance  Standing up from a chair using your arms (e.g. wheelchair or bedside chair)?: 2 = A lot of assistance  Walking in hospital room?: 2 = A lot of assistance  Climbing 3-5 steps with a railing?: 2 = A lot of assistance  Score: 12   Row Comment: Ask the patient "How much help from another person do you currently need? (If the patient hasn't done an activity recently, how much help from another person do you think he/she needs if he/she tried?)    Cognitive/Neuro      Cognitive/Neuro/Behavioral  Cognitive/Neuro/Behavioral [WDL Definition: Alert; opens eyes spontaneously; arouses to voice or touch; oriented x 4; follows commands; speech spontaneous, logical; purposeful motor response; behavior appropriate to situation]: WDL except  Level of Consciousness: confused  Orientation: person;  time;  situation    Language Assistance  Preferred Language to Address Healthcare Preferred Language to Address Healthcare: Liam  's name: Meliza   Patient/Caregiver offered   services: yes  Patient/Caregiver accepted  services: yes   ID: [id number]     Therapeutic Interventions      Bed Mobility  Bed Mobility Training Rolling/Turning: minimum assist (75% patient effort);  2 person assist;  verbal cues;  nonverbal cues (demo/gestures);  bed rails  Bed Mobility Training Sit-to-Supine: minimum assist (75% patient effort);  2 person assist;  nonverbal cues (demo/gestures);  verbal cues;  bed rails  Bed Mobility Training Supine-to-Sit: minimum assist (75% patient effort);  nonverbal cues (demo/gestures);  verbal cues;  bed rails;  2 person assist  Bed Mobility Training Limitations: decreased ability to use arms for pushing/pulling;  decreased ability to use legs for bridging/pushing;  impaired ability to control trunk for mobility;  decreased strength;  impaired balance;  impaired postural control;  pain;  cognitive, decreased safety awareness    Sit-Stand Transfer Training  Transfer Training Sit-to-Stand Transfer: moderate assist (50% patient effort);  2 person assist;  verbal cues;  nonverbal cues (demo/gestures);  rolling walker  Transfer Training Stand-to-Sit Transfer: moderate assist (50% patient effort);  2 person assist;  nonverbal cues (demo/gestures);  verbal cues;  rolling walker  Sit-to-Stand Transfer Training Transfer Safety Analysis: decreased balance;  decreased cognition;  decreased step length;  decreased weight-shifting ability;  decreased strength;  impaired balance;  impaired postural control;  cognitive, decreased safety awareness;  rolling walker    Gait Training  Gait Training: moderate assist (50% patient effort);  maximum assist (25% patient effort);  2 person assist;  nonverbal cues (demo/gestures);  verbal cues;  rolling walker;  5 feet;  x3  Gait Analysis: 3-point gait   decreased rommel;  crouch;  increased time in double stance;  decreased hip/knee flexion;  shuffling;  decreased step length;  decreased trunk rotation;  decreased weight-shifting ability;  decreased strength;  impaired balance;  impaired postural control;  cognitive, decreased safety awareness;  impaired motor control;  5 feet;  x3;  rolling walker    Therapeutic Exercise  Therapeutic Exercise Detail: BLE sitting on EOB x10: LAQ, marching, ankle pumpsweight shifting in standing x30sec w/ [id number] outside of ROBI and across midline w/ BUE [id number] toe taps on to cup x10 w/ BLE while standing w/ RW and minAx2         Pain Assessment/Number Scale (0-10) Adult  Presence of Pain: denies pain/discomfort (Rating = 0)  Pain Rating (0-10): Rest: 0 (no pain/absence of nonverbal indicators of pain)  Pain Rating (0-10): Activity: 0 (no pain/absence of nonverbal indicators of pain)    Safety      AM-PAC Functional Assessment: Basic Mobility  Type of Assessment: Daily assessment  Turning from your back to your side while in a flat bed without using bedrails?: 2 = A lot of assistance  Moving from lying on your back to sitting on the flat side of a flat bed without using bedrails?: 2 = A lot of assistance  Moving to and from a bed to a chair (including a wheelchair)?: 2 = A lot of assistance  Standing up from a chair using your arms (e.g. wheelchair or bedside chair)?: 2 = A lot of assistance  Walking in hospital room?: 2 = A lot of assistance  Climbing 3-5 steps with a railing?: 2 = A lot of assistance  Score: 12   Row Comment: Ask the patient "How much help from another person do you currently need? (If the patient hasn't done an activity recently, how much help from another person do you think he/she needs if he/she tried?)    Cognitive/Neuro      Cognitive/Neuro/Behavioral  Cognitive/Neuro/Behavioral [WDL Definition: Alert; opens eyes spontaneously; arouses to voice or touch; oriented x 4; follows commands; speech spontaneous, logical; purposeful motor response; behavior appropriate to situation]: WDL except  Level of Consciousness: confused  Orientation: person;  time;  situation    Language Assistance  Preferred Language to Address Healthcare Preferred Language to Address Healthcare: Liam  's name: Meliza   Patient/Caregiver offered   services: yes  Patient/Caregiver accepted  services: yes   ID: [id number]     Therapeutic Interventions      Bed Mobility  Bed Mobility Training Rolling/Turning: minimum assist (75% patient effort);  2 person assist;  verbal cues;  nonverbal cues (demo/gestures);  bed rails  Bed Mobility Training Sit-to-Supine: minimum assist (75% patient effort);  2 person assist;  nonverbal cues (demo/gestures);  verbal cues;  bed rails  Bed Mobility Training Supine-to-Sit: minimum assist (75% patient effort);  nonverbal cues (demo/gestures);  verbal cues;  bed rails;  2 person assist  Bed Mobility Training Limitations: decreased ability to use arms for pushing/pulling;  decreased ability to use legs for bridging/pushing;  impaired ability to control trunk for mobility;  decreased strength;  impaired balance;  impaired postural control;  pain;  cognitive, decreased safety awareness    Sit-Stand Transfer Training  Transfer Training Sit-to-Stand Transfer: moderate assist (50% patient effort);  2 person assist;  verbal cues;  nonverbal cues (demo/gestures);  rolling walker  Transfer Training Stand-to-Sit Transfer: moderate assist (50% patient effort);  2 person assist;  nonverbal cues (demo/gestures);  verbal cues;  rolling walker  Sit-to-Stand Transfer Training Transfer Safety Analysis: decreased balance;  decreased cognition;  decreased step length;  decreased weight-shifting ability;  decreased strength;  impaired balance;  impaired postural control;  cognitive, decreased safety awareness;  rolling walker    Gait Training  Gait Training: moderate assist (50% patient effort);  maximum assist (25% patient effort);  2 person assist;  nonverbal cues (demo/gestures);  verbal cues;  rolling walker;  5 feet;  x3  Gait Analysis: 3-point gait   decreased rommel;  crouch;  increased time in double stance;  decreased hip/knee flexion;  shuffling;  decreased step length;  decreased trunk rotation;  decreased weight-shifting ability;  decreased strength;  impaired balance;  impaired postural control;  cognitive, decreased safety awareness;  impaired motor control;  5 feet;  x3;  rolling walker    Therapeutic Exercise  Therapeutic Exercise Detail: BLE sitting on EOB x10: LAQ, marching, ankle pumpsweight shifting in standing x30sec w/ [id number] outside of ROBI and across midline w/ BUE [id number] toe taps on to cup x10 w/ BLE while standing w/ RW and minAx2           PM&R Impression : as above    Current Disposition Plan Recommendations :   acute rehab placement

## 2023-04-28 NOTE — PROGRESS NOTE ADULT - PROBLEM SELECTOR PLAN 5
- Known Hx of Afib  - Unclear if pt was on Eliquis prior to 2011(daughters unaware).  - Was started on plavix after extensive discussion w/pts Neuro and cards post cerebellar bleed in 2011.  - Plavix currently held for R Frontal IPH.  - Risk of starting Plavix out weighs benefits given amyloid angiopathy and areas of chronic hemorrhage

## 2023-04-28 NOTE — PROGRESS NOTE ADULT - PROBLEM SELECTOR PLAN 8
- Incidental finding in pan scan for Trauma w/u.  - Patient asymptomatic, negative burns's sign  - CT abd/pelvis: Cholelitiasis w/ bladder wall thickening   - abdominal US w/ possible cholecystitis- can f/u outpt w/ GI or Sx for poss HIDA scan i/s/o incidental abd U/S finding. Pt afebrile, LFTs WNL, benign abd exam.  - Lipase/Amylase WNL.

## 2023-04-28 NOTE — PROGRESS NOTE ADULT - PROBLEM SELECTOR PLAN 3
- -160   - Hydralazine 50mg Q8 with parameters to hold if <120 systolic  - BP readings >120 < 150 x 48 hours  - Continues on metoprolol 50mg BID  - TTE with bubble: Normal LV systolic function. No PFO.

## 2023-04-28 NOTE — PROGRESS NOTE ADULT - PROBLEM SELECTOR PLAN 7
- Age indeterminate T5 and L5 Fx  - Pt consistently reports pain; unable to give pain scale  - Standing Tylenol 650mg PO Q6  - Lidocaine patch X2(upper and lower back)  - Tramadol 25mg given once at 2100 on 4/24 due to pain  - Tramadol 12.5mg Q8 for breakthrough pain

## 2023-04-28 NOTE — PROGRESS NOTE ADULT - PROBLEM SELECTOR PLAN 1
- continue to hold home Plavix (plan to f/u outpt about resuming Plavix).  - q4hr stroke neuro checks and vitals   - MRI head: 2.7 cm acute/subacute right frontal lobe hematoma corresponding to CT head. Additional areas of chronic hemorrhage with atrophy. These findings may reflect amyloid angiopathy. No acute infarction   - HCT Results  4/20: Right frontal acute parenchymal hemorrhage   - HCT Results 4/21: Stable right frontal intraparenchymal hematoma   - CTA Results: Questionable FMD involving the distal right internal carotid artery   - Continue w/Keppra 500 BID X 7 days for seizure prophylaxis (4/21-4/27)  - vEEG D/C'd- Negative   - Stroke education

## 2023-04-28 NOTE — PROGRESS NOTE ADULT - PROBLEM SELECTOR PLAN 6
- Holding statin in setting of IPH

## 2023-04-28 NOTE — PROGRESS NOTE ADULT - NS ATTEND AMEND GEN_ALL_CORE FT
The patient is an 87-year-old Cantonese speaking male with a history of atrial fibrilation (previously on a/c, off now due to h/o cerebellar ICH), dementia, hypertension, prior stroke (previously on Plavix), admitted after fall/found down in setting to right cortical ICH. CTA showed no underling vascular abnormality. MRI consistent with CAA. SBP<160 - continue current BP regimen. Continue Keppra per neurosurgery. Patient may be candidate for watchman device in future. Will avoid a/c given suspected CAA. Awaiting placement.
The patient is an 87-year-old Cantonese speaking male with a history of atrial fibrilation (previously on a/c, off now due to h/o cerebellar ICH), dementia, hypertension, prior stroke (previously on Plavix), admitted after fall/found down in setting to right cortical ICH. CTA showed no underling vascular abnormality. MRI is pending to better understand etiology of ICH. SBP<160 – will adjust BP meds accordingly- currently hypotensive at times. Continue Keppra. Patient may be candidate for watchman device in future.
The patient is an 87-year-old [place]onese speaking male with a history of atrial fibrillation (previously on a/c, off now due to h/o cerebellar ICH), dementia, hypertension, prior stroke (previously on Plavix), admitted after fall/found down in setting to right cortical ICH.  CTA showed no underling vascular abnormality. MRI consistent with CAA. Suspect bleed is NONTRAUMATIC and related to CAA.  SBP<160, ideally <140- will add amlodipine 2.5 mg daily . Continue Keppra for 3 months with repeat EEG in outpatient setting. Patient may be candidate for watchman device in future. Will avoid a/c , antiplatelet for now given suspected CAA. D/C today.

## 2023-04-28 NOTE — PROGRESS NOTE ADULT - PROBLEM SELECTOR PLAN 2
- pt tugging at bundy line, requiring restraints after 5pm  - concern for sun downing  - c/w seroquel 12.5mg qd at 17:00  - continue to encourage redirection, reorientation and environment

## 2023-04-28 NOTE — PROGRESS NOTE ADULT - PROBLEM SELECTOR PROBLEM 8
Thickening of wall of gallbladder

## 2023-04-28 NOTE — DISCHARGE NOTE NURSING/CASE MANAGEMENT/SOCIAL WORK - NSDCPEFALRISK_GEN_ALL_CORE
For information on Fall & Injury Prevention, visit: [email or web address] OR  [email or web address] OR  https://www.cdc.gov/steadi/patient.html

## 2023-04-28 NOTE — PROGRESS NOTE ADULT - TIME BILLING
Review of chart information, interpretation of data, evaluation of patient, coordination of care.

## 2023-04-28 NOTE — PROGRESS NOTE ADULT - ASSESSMENT
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 UA, UCX, BCx NGTD. BLE Doppler negative for DVT   - on keppra 500 q12    #HTN - -140. Home on toprol 50, hydralazine 25 TID   - on lopressor 50 BID and hydralazine 50 q8h   #AFib - Home on toprol 50. Holding AC d/t intraparenchymal hematoma  [id number] score 3 = 3.2% risk of stroke per year  MR brain redemonstrates frontal cortical hematoma    #T5-L5 Compression fracture - home on tylenol   - on standing tylenol and daily lidocaine patches   - PRN Tramadol  #Osteoporosis - Vit 25,OH-D at target. nml PTH.     #Rhabdomyolysis - improving. CK 1100 4/24. Peaked at 1500 4/21  #Cholelithiasis - asymptomatic  #Mild dementia - baseline reported to be a/o x2 - c/w donepezil 5 HS  #BPH - on flomax and finasteride    Recommend  Would make seroquel 12.5mg PRN HS in setting of slight increase in transaminases  Can add amlodipine 2.5mg daily on discharge.     Discussed w daughter Kimberly 4/27- would continue to hold anticoagulation and antiplatelet for Atrial fibrillation d/t presence of CAA. Discussed stroke risk and risk of further hemorrhage and fall risk.   Noted f/u w structural heart for Watchman procedure.   PPx: SQL    DISPO: acute rehab

## 2023-05-02 DIAGNOSIS — Z20.822 CONTACT WITH AND (SUSPECTED) EXPOSURE TO COVID-19: ICD-10-CM

## 2023-05-02 DIAGNOSIS — I48.11 LONGSTANDING PERSISTENT ATRIAL FIBRILLATION: ICD-10-CM

## 2023-05-02 DIAGNOSIS — I24.8 OTHER FORMS OF ACUTE ISCHEMIC HEART DISEASE: ICD-10-CM

## 2023-05-02 DIAGNOSIS — M48.56XA COLLAPSED VERTEBRA, NOT ELSEWHERE CLASSIFIED, LUMBAR REGION, INITIAL ENCOUNTER FOR FRACTURE: ICD-10-CM

## 2023-05-02 DIAGNOSIS — I95.9 HYPOTENSION, UNSPECIFIED: ICD-10-CM

## 2023-05-02 DIAGNOSIS — Z86.73 PERSONAL HISTORY OF TRANSIENT ISCHEMIC ATTACK (TIA), AND CEREBRAL INFARCTION WITHOUT RESIDUAL DEFICITS: ICD-10-CM

## 2023-05-02 DIAGNOSIS — E78.5 HYPERLIPIDEMIA, UNSPECIFIED: ICD-10-CM

## 2023-05-02 DIAGNOSIS — F03.A0 UNSPECIFIED DEMENTIA, MILD, WITHOUT BEHAVIORAL DISTURBANCE, PSYCHOTIC DISTURBANCE, MOOD DISTURBANCE, AND ANXIETY: ICD-10-CM

## 2023-05-02 DIAGNOSIS — R32 UNSPECIFIED URINARY INCONTINENCE: ICD-10-CM

## 2023-05-02 DIAGNOSIS — M48.55XA COLLAPSED VERTEBRA, NOT ELSEWHERE CLASSIFIED, THORACOLUMBAR REGION, INITIAL ENCOUNTER FOR FRACTURE: ICD-10-CM

## 2023-05-02 DIAGNOSIS — R29.701 NIHSS SCORE 1: ICD-10-CM

## 2023-05-02 DIAGNOSIS — I61.1 NONTRAUMATIC INTRACEREBRAL HEMORRHAGE IN HEMISPHERE, CORTICAL: ICD-10-CM

## 2023-05-02 DIAGNOSIS — I45.10 UNSPECIFIED RIGHT BUNDLE-BRANCH BLOCK: ICD-10-CM

## 2023-05-02 DIAGNOSIS — Z79.899 OTHER LONG TERM (CURRENT) DRUG THERAPY: ICD-10-CM

## 2023-05-02 DIAGNOSIS — K80.20 CALCULUS OF GALLBLADDER WITHOUT CHOLECYSTITIS WITHOUT OBSTRUCTION: ICD-10-CM

## 2023-05-02 DIAGNOSIS — Z91.81 HISTORY OF FALLING: ICD-10-CM

## 2023-05-02 DIAGNOSIS — I68.0 CEREBRAL AMYLOID ANGIOPATHY: ICD-10-CM

## 2023-05-02 DIAGNOSIS — D64.9 ANEMIA, UNSPECIFIED: ICD-10-CM

## 2023-05-02 DIAGNOSIS — M81.0 AGE-RELATED OSTEOPOROSIS WITHOUT CURRENT PATHOLOGICAL FRACTURE: ICD-10-CM

## 2023-05-02 DIAGNOSIS — E85.4 ORGAN-LIMITED AMYLOIDOSIS: ICD-10-CM

## 2023-05-02 DIAGNOSIS — I10 ESSENTIAL (PRIMARY) HYPERTENSION: ICD-10-CM

## 2023-05-02 DIAGNOSIS — G47.00 INSOMNIA, UNSPECIFIED: ICD-10-CM

## 2023-05-02 DIAGNOSIS — Z78.1 PHYSICAL RESTRAINT STATUS: ICD-10-CM

## 2023-05-02 DIAGNOSIS — Z79.02 LONG TERM (CURRENT) USE OF ANTITHROMBOTICS/ANTIPLATELETS: ICD-10-CM

## 2023-05-02 DIAGNOSIS — N40.0 BENIGN PROSTATIC HYPERPLASIA WITHOUT LOWER URINARY TRACT SYMPTOMS: ICD-10-CM

## 2023-05-08 ENCOUNTER — APPOINTMENT (OUTPATIENT)
Dept: CARDIOTHORACIC SURGERY | Facility: CLINIC | Age: 88
End: 2023-05-08

## 2023-08-15 NOTE — PROGRESS NOTE ADULT - REASON FOR ADMISSION
R frontal IPH
220

## 2023-09-07 ENCOUNTER — APPOINTMENT (OUTPATIENT)
Dept: NEUROLOGY | Facility: CLINIC | Age: 88
End: 2023-09-07

## 2023-09-19 NOTE — ED ADULT TRIAGE NOTE - NS ED NURSE DIRECT TO ROOM YN
Pt should now be taking rosuvastatin instead of atorvastatin.  If he started the rosuvastatin in May like it was recommended, then he is due for a blood test to recheck the lipid panel and sgot/sgpt.  Ok to refill rosuvastatin if he is in need of that.    Yes

## 2025-04-16 NOTE — PROGRESS NOTE ADULT - PROBLEM SELECTOR PLAN 4
-Troponin downtrending 0.05 > 0.04 > 0.03 , likely demand ischemia, EKG w/ no new ST elevations, pAF and no complaints of chest pain   - CS consulted and states pt will likely need Watchman device in the future as outpatient.   - CS will discuss further with Dr. Chand and patient's family
31.2